# Patient Record
Sex: MALE | Race: WHITE | Employment: OTHER | URBAN - METROPOLITAN AREA
[De-identification: names, ages, dates, MRNs, and addresses within clinical notes are randomized per-mention and may not be internally consistent; named-entity substitution may affect disease eponyms.]

---

## 2017-01-10 ENCOUNTER — OFFICE VISIT (OUTPATIENT)
Dept: FAMILY MEDICINE CLINIC | Age: 72
End: 2017-01-10

## 2017-01-10 VITALS
SYSTOLIC BLOOD PRESSURE: 110 MMHG | BODY MASS INDEX: 29.28 KG/M2 | OXYGEN SATURATION: 97 % | DIASTOLIC BLOOD PRESSURE: 72 MMHG | WEIGHT: 181.4 LBS | HEART RATE: 60 BPM

## 2017-01-10 DIAGNOSIS — I10 ESSENTIAL HYPERTENSION: ICD-10-CM

## 2017-01-10 DIAGNOSIS — G89.29 CHRONIC LEFT SHOULDER PAIN: ICD-10-CM

## 2017-01-10 DIAGNOSIS — I65.23 BILATERAL CAROTID ARTERY STENOSIS: ICD-10-CM

## 2017-01-10 DIAGNOSIS — I67.9 CEREBROVASCULAR DISEASE: Primary | ICD-10-CM

## 2017-01-10 DIAGNOSIS — R01.1 SYSTOLIC MURMUR: ICD-10-CM

## 2017-01-10 DIAGNOSIS — E78.5 HYPERLIPIDEMIA, UNSPECIFIED HYPERLIPIDEMIA TYPE: ICD-10-CM

## 2017-01-10 DIAGNOSIS — M25.512 CHRONIC LEFT SHOULDER PAIN: ICD-10-CM

## 2017-01-10 DIAGNOSIS — Z23 NEED FOR INFLUENZA VACCINATION: ICD-10-CM

## 2017-01-10 PROCEDURE — 90662 IIV NO PRSV INCREASED AG IM: CPT | Performed by: FAMILY MEDICINE

## 2017-01-10 PROCEDURE — G0008 ADMIN INFLUENZA VIRUS VAC: HCPCS | Performed by: FAMILY MEDICINE

## 2017-01-10 PROCEDURE — 99214 OFFICE O/P EST MOD 30 MIN: CPT | Performed by: FAMILY MEDICINE

## 2017-01-10 RX ORDER — PREDNISONE 20 MG/1
20 TABLET ORAL DAILY
Qty: 30 TABLET | Refills: 3 | Status: CANCELLED | OUTPATIENT
Start: 2017-01-10 | End: 2017-02-09

## 2017-01-16 ENCOUNTER — HOSPITAL ENCOUNTER (OUTPATIENT)
Dept: OTHER | Age: 72
Discharge: HOME | End: 2017-01-16
Attending: FAMILY MEDICINE | Admitting: FAMILY MEDICINE

## 2017-01-16 LAB
A/G RATIO: 1.7 (ref 1.1–2.2)
ALBUMIN SERPL-MCNC: 4.9 G/DL (ref 3.4–5)
ALP BLD-CCNC: 66 U/L (ref 40–129)
ALT SERPL-CCNC: 21 U/L (ref 10–40)
ANION GAP SERPL CALCULATED.3IONS-SCNC: 20 MMOL/L (ref 3–16)
AST SERPL-CCNC: 21 U/L (ref 15–37)
BILIRUB SERPL-MCNC: 0.8 MG/DL (ref 0–1)
BUN BLDV-MCNC: 19 MG/DL (ref 7–20)
CALCIUM SERPL-MCNC: 9.9 MG/DL (ref 8.3–10.6)
CHLORIDE BLD-SCNC: 107 MMOL/L (ref 99–110)
CHOLESTEROL, TOTAL: 117 MG/DL (ref 0–199)
CO2: 20 MMOL/L (ref 21–32)
CREAT SERPL-MCNC: 1.2 MG/DL (ref 0.8–1.3)
GFR AFRICAN AMERICAN: >60
GFR NON-AFRICAN AMERICAN: 59
GLOBULIN: 2.9 G/DL
GLUCOSE BLD-MCNC: 104 MG/DL (ref 70–99)
HDLC SERPL-MCNC: 46 MG/DL (ref 40–60)
LDL CHOLESTEROL CALCULATED: 54 MG/DL
POTASSIUM SERPL-SCNC: 4.3 MMOL/L (ref 3.5–5.1)
SODIUM BLD-SCNC: 147 MMOL/L (ref 136–145)
TOTAL PROTEIN: 7.8 G/DL (ref 6.4–8.2)
TRIGL SERPL-MCNC: 85 MG/DL (ref 0–150)
VLDLC SERPL CALC-MCNC: 17 MG/DL

## 2017-01-17 ENCOUNTER — HOSPITAL ENCOUNTER (OUTPATIENT)
Dept: VASCULAR LAB | Age: 72
Discharge: HOME | End: 2017-01-17
Attending: FAMILY MEDICINE | Admitting: FAMILY MEDICINE

## 2017-01-17 ENCOUNTER — TELEPHONE (OUTPATIENT)
Dept: FAMILY MEDICINE CLINIC | Age: 72
End: 2017-01-17

## 2017-01-17 ENCOUNTER — HOSPITAL ENCOUNTER (OUTPATIENT)
Dept: NON INVASIVE DIAGNOSTICS | Age: 72
Discharge: HOME | End: 2017-01-17
Attending: FAMILY MEDICINE | Admitting: FAMILY MEDICINE

## 2017-01-17 DIAGNOSIS — R01.1 CARDIAC MURMUR: ICD-10-CM

## 2017-01-17 DIAGNOSIS — I37.1 PULMONARY VALVE INSUFFICIENCY, UNSPECIFIED ETIOLOGY: Primary | ICD-10-CM

## 2017-01-17 DIAGNOSIS — I65.23 OCCLUSION AND STENOSIS OF BILATERAL CAROTID ARTERIES: ICD-10-CM

## 2017-01-17 LAB
LEFT VENTRICULAR EJECTION FRACTION HIGH VALUE: 65 %
LEFT VENTRICULAR EJECTION FRACTION MODE: NORMAL
LV EF: 60 %
LV EF: 63 %
LVEF MODALITY: NORMAL

## 2017-02-13 ENCOUNTER — TELEPHONE (OUTPATIENT)
Dept: FAMILY MEDICINE CLINIC | Age: 72
End: 2017-02-13

## 2017-02-13 DIAGNOSIS — I67.9 CEREBROVASCULAR DISEASE: ICD-10-CM

## 2017-02-13 DIAGNOSIS — K21.9 GASTROESOPHAGEAL REFLUX DISEASE WITHOUT ESOPHAGITIS: ICD-10-CM

## 2017-02-13 RX ORDER — SIMVASTATIN 20 MG
TABLET ORAL
Qty: 90 TABLET | Refills: 3 | Status: SHIPPED | OUTPATIENT
Start: 2017-02-13 | End: 2018-05-17 | Stop reason: SDUPTHER

## 2017-02-13 RX ORDER — PANTOPRAZOLE SODIUM 40 MG/1
40 TABLET, DELAYED RELEASE ORAL DAILY
Qty: 90 TABLET | Refills: 3 | Status: SHIPPED | OUTPATIENT
Start: 2017-02-13 | End: 2018-02-26 | Stop reason: SDUPTHER

## 2017-02-15 ENCOUNTER — OFFICE VISIT (OUTPATIENT)
Dept: CARDIOLOGY CLINIC | Age: 72
End: 2017-02-15

## 2017-02-15 VITALS
WEIGHT: 181.4 LBS | BODY MASS INDEX: 29.15 KG/M2 | HEIGHT: 66 IN | SYSTOLIC BLOOD PRESSURE: 98 MMHG | DIASTOLIC BLOOD PRESSURE: 66 MMHG

## 2017-02-15 DIAGNOSIS — I10 ESSENTIAL HYPERTENSION: Primary | ICD-10-CM

## 2017-02-15 DIAGNOSIS — I65.23 BILATERAL CAROTID ARTERY STENOSIS: ICD-10-CM

## 2017-02-15 DIAGNOSIS — I37.1 NONRHEUMATIC PULMONARY VALVE INSUFFICIENCY: ICD-10-CM

## 2017-02-15 PROCEDURE — 99214 OFFICE O/P EST MOD 30 MIN: CPT | Performed by: INTERNAL MEDICINE

## 2017-02-15 PROCEDURE — 93000 ELECTROCARDIOGRAM COMPLETE: CPT | Performed by: INTERNAL MEDICINE

## 2017-03-23 RX ORDER — AMLODIPINE BESYLATE 10 MG/1
TABLET ORAL
Qty: 90 TABLET | Refills: 3 | Status: SHIPPED | OUTPATIENT
Start: 2017-03-23 | End: 2017-12-15 | Stop reason: SDUPTHER

## 2017-04-25 RX ORDER — LISINOPRIL 40 MG/1
TABLET ORAL
Qty: 90 TABLET | Refills: 0 | Status: SHIPPED | OUTPATIENT
Start: 2017-04-25 | End: 2017-08-07 | Stop reason: SDUPTHER

## 2017-05-17 ENCOUNTER — TELEPHONE (OUTPATIENT)
Dept: FAMILY MEDICINE CLINIC | Age: 72
End: 2017-05-17

## 2017-05-17 PROBLEM — R42 DIZZINESS: Status: ACTIVE | Noted: 2017-05-17

## 2017-05-22 ENCOUNTER — TELEPHONE (OUTPATIENT)
Dept: PHARMACY | Facility: CLINIC | Age: 72
End: 2017-05-22

## 2017-05-22 DIAGNOSIS — H81.10 VERTIGO, BENIGN PAROXYSMAL, UNSPECIFIED LATERALITY: Primary | ICD-10-CM

## 2017-05-23 ENCOUNTER — OFFICE VISIT (OUTPATIENT)
Dept: FAMILY MEDICINE CLINIC | Age: 72
End: 2017-05-23

## 2017-05-23 VITALS
DIASTOLIC BLOOD PRESSURE: 62 MMHG | SYSTOLIC BLOOD PRESSURE: 118 MMHG | BODY MASS INDEX: 28.57 KG/M2 | WEIGHT: 177 LBS | HEART RATE: 58 BPM | OXYGEN SATURATION: 97 %

## 2017-05-23 DIAGNOSIS — L30.9 DERMATITIS: ICD-10-CM

## 2017-05-23 DIAGNOSIS — I67.9 CEREBROVASCULAR DISEASE: ICD-10-CM

## 2017-05-23 DIAGNOSIS — R42 VERTIGO: Primary | ICD-10-CM

## 2017-05-23 RX ORDER — CLOTRIMAZOLE AND BETAMETHASONE DIPROPIONATE 10; .64 MG/G; MG/G
CREAM TOPICAL
Qty: 1 TUBE | Refills: 3 | Status: SHIPPED | OUTPATIENT
Start: 2017-05-23 | End: 2017-07-11 | Stop reason: SDUPTHER

## 2017-05-23 RX ORDER — CLOPIDOGREL BISULFATE 75 MG/1
75 TABLET ORAL DAILY
Qty: 90 TABLET | Refills: 3 | Status: SHIPPED | OUTPATIENT
Start: 2017-05-23 | End: 2018-05-07 | Stop reason: SDUPTHER

## 2017-06-07 DIAGNOSIS — R35.1 NOCTURIA: ICD-10-CM

## 2017-06-07 DIAGNOSIS — R32 INCONTINENCE: ICD-10-CM

## 2017-06-08 RX ORDER — TAMSULOSIN HYDROCHLORIDE 0.4 MG/1
CAPSULE ORAL
Qty: 30 CAPSULE | Refills: 0 | Status: SHIPPED | OUTPATIENT
Start: 2017-06-08 | End: 2018-01-11 | Stop reason: ALTCHOICE

## 2017-06-09 ENCOUNTER — HOSPITAL ENCOUNTER (OUTPATIENT)
Dept: PHYSICAL THERAPY | Age: 72
Discharge: HOME | End: 2017-06-30
Attending: FAMILY MEDICINE | Admitting: FAMILY MEDICINE

## 2017-06-17 PROCEDURE — 99496 TRANSJ CARE MGMT HIGH F2F 7D: CPT | Performed by: FAMILY MEDICINE

## 2017-07-11 ENCOUNTER — OFFICE VISIT (OUTPATIENT)
Dept: FAMILY MEDICINE CLINIC | Age: 72
End: 2017-07-11

## 2017-07-11 VITALS
BODY MASS INDEX: 28.41 KG/M2 | WEIGHT: 176 LBS | OXYGEN SATURATION: 98 % | SYSTOLIC BLOOD PRESSURE: 110 MMHG | DIASTOLIC BLOOD PRESSURE: 62 MMHG | HEART RATE: 60 BPM

## 2017-07-11 DIAGNOSIS — L30.9 DERMATITIS: ICD-10-CM

## 2017-07-11 DIAGNOSIS — I69.359 HEMIPARESIS DUE TO OLD LACUNAR STROKE (HCC): ICD-10-CM

## 2017-07-11 DIAGNOSIS — R42 VERTIGO: Primary | ICD-10-CM

## 2017-07-11 DIAGNOSIS — I10 ESSENTIAL HYPERTENSION: Chronic | ICD-10-CM

## 2017-07-11 DIAGNOSIS — E78.5 HYPERLIPIDEMIA, UNSPECIFIED HYPERLIPIDEMIA TYPE: Chronic | ICD-10-CM

## 2017-07-11 DIAGNOSIS — I65.23 BILATERAL CAROTID ARTERY STENOSIS: Chronic | ICD-10-CM

## 2017-07-11 PROCEDURE — 99214 OFFICE O/P EST MOD 30 MIN: CPT | Performed by: FAMILY MEDICINE

## 2017-07-11 RX ORDER — CLOTRIMAZOLE AND BETAMETHASONE DIPROPIONATE 10; .64 MG/G; MG/G
CREAM TOPICAL
Qty: 3 TUBE | Refills: 3 | Status: SHIPPED | OUTPATIENT
Start: 2017-07-11 | End: 2017-10-17 | Stop reason: SDUPTHER

## 2017-08-07 ENCOUNTER — TELEPHONE (OUTPATIENT)
Dept: FAMILY MEDICINE CLINIC | Age: 72
End: 2017-08-07

## 2017-08-07 RX ORDER — LISINOPRIL 40 MG/1
TABLET ORAL
Qty: 90 TABLET | Refills: 1 | Status: SHIPPED | OUTPATIENT
Start: 2017-08-07 | End: 2018-02-20 | Stop reason: SDUPTHER

## 2017-09-25 ENCOUNTER — TELEPHONE (OUTPATIENT)
Dept: FAMILY MEDICINE CLINIC | Age: 72
End: 2017-09-25

## 2017-10-17 DIAGNOSIS — L30.9 DERMATITIS: ICD-10-CM

## 2017-10-19 RX ORDER — CLOTRIMAZOLE AND BETAMETHASONE DIPROPIONATE 10; .64 MG/G; MG/G
CREAM TOPICAL
Qty: 45 G | Refills: 0 | Status: SHIPPED | OUTPATIENT
Start: 2017-10-19 | End: 2017-11-13 | Stop reason: SDUPTHER

## 2017-11-13 ENCOUNTER — TELEPHONE (OUTPATIENT)
Dept: FAMILY MEDICINE CLINIC | Age: 72
End: 2017-11-13

## 2017-11-13 DIAGNOSIS — L30.9 DERMATITIS: ICD-10-CM

## 2017-11-13 RX ORDER — CLOTRIMAZOLE AND BETAMETHASONE DIPROPIONATE 10; .64 MG/G; MG/G
CREAM TOPICAL
Qty: 45 G | Refills: 0 | Status: SHIPPED | OUTPATIENT
Start: 2017-11-13 | End: 2017-12-14 | Stop reason: SDUPTHER

## 2017-11-13 NOTE — TELEPHONE ENCOUNTER
Patient called to request a refill of    clotrimazole-betamethasone (LOTRISONE) 1-0.05 % cream [[id number]]    Pharmacy: [facility] on [address] [address]

## 2017-12-14 ENCOUNTER — TELEPHONE (OUTPATIENT)
Dept: FAMILY MEDICINE CLINIC | Age: 72
End: 2017-12-14

## 2017-12-14 DIAGNOSIS — L30.9 DERMATITIS: ICD-10-CM

## 2017-12-14 RX ORDER — CLOTRIMAZOLE AND BETAMETHASONE DIPROPIONATE 10; .64 MG/G; MG/G
CREAM TOPICAL
Qty: 45 G | Refills: 0 | Status: SHIPPED | OUTPATIENT
Start: 2017-12-14 | End: 2021-01-04 | Stop reason: SDUPTHER

## 2017-12-15 ENCOUNTER — TELEPHONE (OUTPATIENT)
Dept: FAMILY MEDICINE CLINIC | Age: 72
End: 2017-12-15

## 2017-12-15 RX ORDER — AMLODIPINE BESYLATE 10 MG/1
TABLET ORAL
Qty: 90 TABLET | Refills: 0 | Status: SHIPPED | OUTPATIENT
Start: 2017-12-15 | End: 2018-02-26 | Stop reason: SDUPTHER

## 2017-12-15 NOTE — TELEPHONE ENCOUNTER
amLODIPine (NORVASC) 10 MG tablet 90 tablet 3 3/23/2017     Sig: TAKE 1 TABLET DAILY      [facility] in chart

## 2018-01-11 ENCOUNTER — OFFICE VISIT (OUTPATIENT)
Dept: FAMILY MEDICINE CLINIC | Age: 73
End: 2018-01-11

## 2018-01-11 VITALS
BODY MASS INDEX: 29.18 KG/M2 | HEART RATE: 65 BPM | SYSTOLIC BLOOD PRESSURE: 130 MMHG | DIASTOLIC BLOOD PRESSURE: 72 MMHG | OXYGEN SATURATION: 96 % | WEIGHT: 180.8 LBS

## 2018-01-11 DIAGNOSIS — R27.0 ATAXIA: ICD-10-CM

## 2018-01-11 DIAGNOSIS — I69.359 HEMIPARESIS DUE TO OLD LACUNAR STROKE (HCC): ICD-10-CM

## 2018-01-11 DIAGNOSIS — D69.6 THROMBOCYTOPENIA (HCC): ICD-10-CM

## 2018-01-11 DIAGNOSIS — R73.9 HYPERGLYCEMIA: ICD-10-CM

## 2018-01-11 DIAGNOSIS — I67.9 CEREBROVASCULAR DISEASE: ICD-10-CM

## 2018-01-11 DIAGNOSIS — I65.23 BILATERAL CAROTID ARTERY STENOSIS: Chronic | ICD-10-CM

## 2018-01-11 DIAGNOSIS — E78.5 HYPERLIPIDEMIA, UNSPECIFIED HYPERLIPIDEMIA TYPE: Chronic | ICD-10-CM

## 2018-01-11 DIAGNOSIS — I10 ESSENTIAL HYPERTENSION: Primary | Chronic | ICD-10-CM

## 2018-01-11 PROCEDURE — 99214 OFFICE O/P EST MOD 30 MIN: CPT | Performed by: FAMILY MEDICINE

## 2018-01-11 NOTE — PROGRESS NOTES
Subjective:      Patient ID: Iris Mcknight is a 67 y.o. male. HPI     Patient is doing well overall. He gets out to lunch or dinner with his sister about once a week. He ambulates with the assistance of a walker. History of carotid artery stenosis. CT angiogram of the head and 5/17/2017 showed moderate stenosis of the proximal right internal carotid artery of 50% and mild stenosis in the proximal left internal carotid artery of 25%. He says a history of seborrheic dermatitis. Lotrisone cream seems to be helping significantly. Does not check home bp. No ha, cp, or sob. Once every 3-4 months has a brief seconds long pain in left testicle. No swelling or masses. No urination problems. Hx of thrombocytopenia.   Follows with Dr. Rina Abernathy.     Review of Systems    Patient Active Problem List   Diagnosis    Hypertension    Physical exam, routine    Cerebrovascular disease    DDD (degenerative disc disease), lumbosacral    Displacement of lumbar intervertebral disc without myelopathy    Hyperlipidemia    Carotid artery stenosis    Conductive hearing loss of both ears    Impacted cerumen of both ears    Thrombocytopenia ([address]    Acute kidney injury ([address]    Gastroesophageal reflux disease without esophagitis    Right hemiparesis (HCC)    Vertigo    Ataxia    Hemiparesis due to old lacunar stroke ([address]    Vertigo, benign paroxysmal    HTN (hypertension), benign    CAD in native artery    Dyslipidemia       Outpatient Prescriptions Marked as Taking for the 1/11/18 encounter (Office Visit) with Teresa Alanis MD   Medication Sig Dispense Refill    amLODIPine (NORVASC) 10 MG tablet TAKE 1 TABLET DAILY 90 tablet 0    clotrimazole-betamethasone (LOTRISONE) 1-0.05 % cream APPLY TOPICALLY TWICE DAILY 45 g 0    lisinopril (PRINIVIL;ZESTRIL) 40 MG tablet TAKE 1 TABLET DAILY 90 tablet 1    clopidogrel (PLAVIX) 75 MG tablet Take 1 tablet by mouth daily 90 tablet 3    aspirin

## 2018-01-25 ENCOUNTER — HOSPITAL ENCOUNTER (OUTPATIENT)
Dept: OTHER | Age: 73
Discharge: HOME | End: 2018-01-25
Attending: FAMILY MEDICINE | Admitting: FAMILY MEDICINE

## 2018-01-25 DIAGNOSIS — D69.6 THROMBOCYTOPENIA (HCC): ICD-10-CM

## 2018-01-25 DIAGNOSIS — R73.9 HYPERGLYCEMIA: ICD-10-CM

## 2018-01-25 DIAGNOSIS — I10 ESSENTIAL HYPERTENSION: Chronic | ICD-10-CM

## 2018-01-25 LAB
ANION GAP SERPL CALCULATED.3IONS-SCNC: 13 MMOL/L (ref 3–16)
BUN BLDV-MCNC: 15 MG/DL (ref 7–20)
CALCIUM SERPL-MCNC: 9.8 MG/DL (ref 8.3–10.6)
CHLORIDE BLD-SCNC: 103 MMOL/L (ref 99–110)
CO2: 24 MMOL/L (ref 21–32)
CREAT SERPL-MCNC: 1 MG/DL (ref 0.8–1.3)
GFR AFRICAN AMERICAN: >60
GFR NON-AFRICAN AMERICAN: >60
GLUCOSE BLD-MCNC: 97 MG/DL (ref 70–99)
HCT VFR BLD CALC: 50.4 % (ref 40.5–52.5)
HEMOGLOBIN: 17.1 G/DL (ref 13.5–17.5)
MCH RBC QN AUTO: 31.6 PG (ref 26–34)
MCHC RBC AUTO-ENTMCNC: 33.9 G/DL (ref 31–36)
MCV RBC AUTO: 93.1 FL (ref 80–100)
PDW BLD-RTO: 13.7 % (ref 12.4–15.4)
PLATELET # BLD: 115 K/UL (ref 135–450)
PMV BLD AUTO: 9.4 FL (ref 5–10.5)
POTASSIUM SERPL-SCNC: 3.9 MMOL/L (ref 3.5–5.1)
RBC # BLD: 5.41 M/UL (ref 4.2–5.9)
SODIUM BLD-SCNC: 140 MMOL/L (ref 136–145)
WBC # BLD: 6.5 K/UL (ref 4–11)

## 2018-01-26 LAB
ESTIMATED AVERAGE GLUCOSE: 111.2 MG/DL
HBA1C MFR BLD: 5.5 %

## 2018-02-20 RX ORDER — LISINOPRIL 40 MG/1
TABLET ORAL
Qty: 90 TABLET | Refills: 2 | Status: SHIPPED | OUTPATIENT
Start: 2018-02-20 | End: 2018-08-27 | Stop reason: SDUPTHER

## 2018-02-26 ENCOUNTER — TELEPHONE (OUTPATIENT)
Dept: FAMILY MEDICINE CLINIC | Age: 73
End: 2018-02-26

## 2018-02-26 DIAGNOSIS — K21.9 GASTROESOPHAGEAL REFLUX DISEASE WITHOUT ESOPHAGITIS: ICD-10-CM

## 2018-02-26 RX ORDER — AMLODIPINE BESYLATE 10 MG/1
TABLET ORAL
Qty: 90 TABLET | Refills: 0 | Status: SHIPPED | OUTPATIENT
Start: 2018-02-26 | End: 2018-06-05 | Stop reason: SDUPTHER

## 2018-02-26 RX ORDER — PANTOPRAZOLE SODIUM 40 MG/1
40 TABLET, DELAYED RELEASE ORAL DAILY
Qty: 90 TABLET | Refills: 3 | Status: SHIPPED | OUTPATIENT
Start: 2018-02-26 | End: 2019-04-04 | Stop reason: SDUPTHER

## 2018-02-26 NOTE — TELEPHONE ENCOUNTER
pantoprazole (PROTONIX) 40 MG tablet 90 tablet 3 2/13/2017     Sig - Route:  Take 1 tablet by mouth daily - Oral      [facility] in chart

## 2018-02-26 NOTE — TELEPHONE ENCOUNTER
amLODIPine (NORVASC) 10 MG tablet 90 tablet 0 12/15/2017     Sig: TAKE 1 TABLET DAILY      [facility] in chart

## 2018-04-19 ENCOUNTER — OFFICE VISIT (OUTPATIENT)
Dept: FAMILY MEDICINE CLINIC | Age: 73
End: 2018-04-19

## 2018-04-19 ENCOUNTER — TELEPHONE (OUTPATIENT)
Dept: FAMILY MEDICINE CLINIC | Age: 73
End: 2018-04-19

## 2018-04-19 VITALS
BODY MASS INDEX: 29.57 KG/M2 | WEIGHT: 183.2 LBS | SYSTOLIC BLOOD PRESSURE: 122 MMHG | DIASTOLIC BLOOD PRESSURE: 72 MMHG | HEART RATE: 73 BPM | OXYGEN SATURATION: 96 %

## 2018-04-19 DIAGNOSIS — M85.80 OSTEOPENIA, UNSPECIFIED LOCATION: Primary | ICD-10-CM

## 2018-04-19 DIAGNOSIS — M25.552 LEFT HIP PAIN: Primary | ICD-10-CM

## 2018-04-19 DIAGNOSIS — M25.552 LEFT HIP PAIN: ICD-10-CM

## 2018-04-19 PROCEDURE — 1111F DSCHRG MED/CURRENT MED MERGE: CPT | Performed by: FAMILY MEDICINE

## 2018-04-19 PROCEDURE — 99213 OFFICE O/P EST LOW 20 MIN: CPT | Performed by: FAMILY MEDICINE

## 2018-04-23 ENCOUNTER — TELEPHONE (OUTPATIENT)
Dept: FAMILY MEDICINE CLINIC | Age: 73
End: 2018-04-23

## 2018-04-23 DIAGNOSIS — M25.552 LEFT HIP PAIN: ICD-10-CM

## 2018-04-23 DIAGNOSIS — I10 ESSENTIAL HYPERTENSION: Primary | Chronic | ICD-10-CM

## 2018-04-25 ENCOUNTER — TELEPHONE (OUTPATIENT)
Dept: FAMILY MEDICINE CLINIC | Age: 73
End: 2018-04-25

## 2018-04-30 ENCOUNTER — HOSPITAL ENCOUNTER (OUTPATIENT)
Dept: GENERAL RADIOLOGY | Age: 73
Discharge: HOME | End: 2018-04-30
Admitting: FAMILY MEDICINE

## 2018-04-30 DIAGNOSIS — M85.80 OTHER SPECIFIED DISORDERS OF BONE DENSITY AND STRUCTURE, UNSPECIFIED SITE (CODE): ICD-10-CM

## 2018-04-30 DIAGNOSIS — M25.552 LEFT HIP PAIN: ICD-10-CM

## 2018-04-30 DIAGNOSIS — M85.80 OSTEOPENIA, UNSPECIFIED LOCATION: ICD-10-CM

## 2018-04-30 PROBLEM — M81.0 AGE-RELATED OSTEOPOROSIS WITHOUT CURRENT PATHOLOGICAL FRACTURE: Status: ACTIVE | Noted: 2018-04-30

## 2018-04-30 LAB
ANION GAP SERPL CALCULATED.3IONS-SCNC: 13 MMOL/L (ref 3–16)
BUN BLDV-MCNC: 13 MG/DL (ref 7–20)
CALCIUM SERPL-MCNC: 10.3 MG/DL (ref 8.3–10.6)
CHLORIDE BLD-SCNC: 100 MMOL/L (ref 99–110)
CO2: 24 MMOL/L (ref 21–32)
CREAT SERPL-MCNC: 1.2 MG/DL (ref 0.8–1.3)
GFR AFRICAN AMERICAN: >60
GFR NON-AFRICAN AMERICAN: 59
GLUCOSE BLD-MCNC: 122 MG/DL (ref 70–99)
POTASSIUM SERPL-SCNC: 4.3 MMOL/L (ref 3.5–5.1)
SODIUM BLD-SCNC: 137 MMOL/L (ref 136–145)

## 2018-05-03 ENCOUNTER — TELEPHONE (OUTPATIENT)
Dept: FAMILY MEDICINE CLINIC | Age: 73
End: 2018-05-03

## 2018-05-03 RX ORDER — ALENDRONATE SODIUM 70 MG/1
70 TABLET ORAL
Qty: 4 TABLET | Refills: 3 | Status: CANCELLED | OUTPATIENT
Start: 2018-05-03

## 2018-05-07 ENCOUNTER — OFFICE VISIT (OUTPATIENT)
Dept: FAMILY MEDICINE CLINIC | Age: 73
End: 2018-05-07

## 2018-05-07 VITALS
HEIGHT: 65 IN | HEART RATE: 58 BPM | BODY MASS INDEX: 30.16 KG/M2 | WEIGHT: 181 LBS | OXYGEN SATURATION: 96 % | DIASTOLIC BLOOD PRESSURE: 70 MMHG | SYSTOLIC BLOOD PRESSURE: 124 MMHG

## 2018-05-07 DIAGNOSIS — I67.9 CEREBROVASCULAR DISEASE: ICD-10-CM

## 2018-05-07 DIAGNOSIS — M25.552 ACUTE HIP PAIN, LEFT: Primary | ICD-10-CM

## 2018-05-07 DIAGNOSIS — M81.0 OSTEOPOROSIS WITHOUT CURRENT PATHOLOGICAL FRACTURE, UNSPECIFIED OSTEOPOROSIS TYPE: ICD-10-CM

## 2018-05-07 PROCEDURE — 99213 OFFICE O/P EST LOW 20 MIN: CPT | Performed by: PHYSICIAN ASSISTANT

## 2018-05-07 RX ORDER — ALENDRONATE SODIUM 70 MG/1
70 TABLET ORAL
Qty: 5 TABLET | Refills: 11 | Status: SHIPPED | OUTPATIENT
Start: 2018-05-07 | End: 2019-04-04 | Stop reason: SDUPTHER

## 2018-05-07 RX ORDER — CLOPIDOGREL BISULFATE 75 MG/1
75 TABLET ORAL DAILY
Qty: 90 TABLET | Refills: 3 | Status: SHIPPED | OUTPATIENT
Start: 2018-05-07 | End: 2018-08-27 | Stop reason: SDUPTHER

## 2018-05-07 ASSESSMENT — ENCOUNTER SYMPTOMS
ABDOMINAL PAIN: 0
SHORTNESS OF BREATH: 0
NAUSEA: 0
COUGH: 0
CONSTIPATION: 0
VOMITING: 0
BACK PAIN: 0

## 2018-05-17 DIAGNOSIS — I67.9 CEREBROVASCULAR DISEASE: ICD-10-CM

## 2018-05-17 RX ORDER — SIMVASTATIN 20 MG
TABLET ORAL
Qty: 90 TABLET | Refills: 0 | Status: SHIPPED | OUTPATIENT
Start: 2018-05-17 | End: 2018-08-29 | Stop reason: SDUPTHER

## 2018-06-05 ENCOUNTER — TELEPHONE (OUTPATIENT)
Dept: FAMILY MEDICINE CLINIC | Age: 73
End: 2018-06-05

## 2018-06-05 RX ORDER — AMLODIPINE BESYLATE 10 MG/1
TABLET ORAL
Qty: 90 TABLET | Refills: 1 | Status: SHIPPED | OUTPATIENT
Start: 2018-06-05 | End: 2018-12-17 | Stop reason: SDUPTHER

## 2018-06-28 ENCOUNTER — TELEPHONE (OUTPATIENT)
Dept: FAMILY MEDICINE CLINIC | Age: 73
End: 2018-06-28

## 2018-08-20 ENCOUNTER — TELEPHONE (OUTPATIENT)
Dept: FAMILY MEDICINE CLINIC | Age: 73
End: 2018-08-20

## 2018-08-27 DIAGNOSIS — I67.9 CEREBROVASCULAR DISEASE: ICD-10-CM

## 2018-08-27 RX ORDER — LISINOPRIL 40 MG/1
TABLET ORAL
Qty: 30 TABLET | Refills: 0 | Status: SHIPPED | OUTPATIENT
Start: 2018-08-27 | End: 2018-12-17 | Stop reason: SDUPTHER

## 2018-08-27 RX ORDER — CLOPIDOGREL BISULFATE 75 MG/1
75 TABLET ORAL DAILY
Qty: 30 TABLET | Refills: 0 | Status: SHIPPED | OUTPATIENT
Start: 2018-08-27 | End: 2019-07-09 | Stop reason: SDUPTHER

## 2018-08-28 ENCOUNTER — OFFICE VISIT (OUTPATIENT)
Dept: FAMILY MEDICINE CLINIC | Age: 73
End: 2018-08-28

## 2018-08-28 VITALS
DIASTOLIC BLOOD PRESSURE: 62 MMHG | BODY MASS INDEX: 28.79 KG/M2 | HEART RATE: 66 BPM | WEIGHT: 173 LBS | OXYGEN SATURATION: 97 % | SYSTOLIC BLOOD PRESSURE: 110 MMHG

## 2018-08-28 DIAGNOSIS — S22.31XA CLOSED FRACTURE OF ONE RIB OF RIGHT SIDE, INITIAL ENCOUNTER: Primary | ICD-10-CM

## 2018-08-28 PROCEDURE — 3288F FALL RISK ASSESSMENT DOCD: CPT | Performed by: PHYSICIAN ASSISTANT

## 2018-08-28 PROCEDURE — G8510 SCR DEP NEG, NO PLAN REQD: HCPCS | Performed by: PHYSICIAN ASSISTANT

## 2018-08-28 PROCEDURE — 99213 OFFICE O/P EST LOW 20 MIN: CPT | Performed by: PHYSICIAN ASSISTANT

## 2018-08-28 ASSESSMENT — PATIENT HEALTH QUESTIONNAIRE - PHQ9
SUM OF ALL RESPONSES TO PHQ QUESTIONS 1-9: 0
1. LITTLE INTEREST OR PLEASURE IN DOING THINGS: 0
SUM OF ALL RESPONSES TO PHQ9 QUESTIONS 1 & 2: 0
SUM OF ALL RESPONSES TO PHQ QUESTIONS 1-9: 0
2. FEELING DOWN, DEPRESSED OR HOPELESS: 0

## 2018-08-28 ASSESSMENT — ENCOUNTER SYMPTOMS
ABDOMINAL PAIN: 0
VOMITING: 0
SHORTNESS OF BREATH: 0
DIARRHEA: 0
NAUSEA: 0
CONSTIPATION: 0
BACK PAIN: 0
COUGH: 0

## 2018-08-28 NOTE — PROGRESS NOTES
I have reviewed the history and physical note and findings.
rales.   Tender to palpate right lateral posterior mid range rib area   Neurological: He is alert and oriented to person, place, and time. Skin: Skin is warm, dry and intact. No bruising and no rash noted. No erythema. Assessment:      Rafat Hui was seen today for fall. Diagnoses and all orders for this visit:    Closed fracture of one rib of right side, initial encounter               Plan:      Can use Tylenol prn as well as the lidocaine patch. Monitor BM's and return if needed. Sounds like he is anxious about going out, so it effects his bowels. He has a incentive spirometry as well to use.          [place], [place]

## 2018-08-29 ENCOUNTER — TELEPHONE (OUTPATIENT)
Dept: FAMILY MEDICINE CLINIC | Age: 73
End: 2018-08-29

## 2018-08-29 DIAGNOSIS — I67.9 CEREBROVASCULAR DISEASE: ICD-10-CM

## 2018-08-29 RX ORDER — SIMVASTATIN 20 MG
TABLET ORAL
Qty: 90 TABLET | Refills: 0 | Status: SHIPPED | OUTPATIENT
Start: 2018-08-29 | End: 2018-10-29 | Stop reason: SDUPTHER

## 2018-08-29 NOTE — TELEPHONE ENCOUNTER
simvastatin (ZOCOR) 20 MG tablet 90 tablet 0 5/17/2018     Sig: TAKE 1 TABLET NIGHTLY    Sent to pharmacy as: Simvastatin 20 MG Oral Tablet        [facility] [place] [address] [facility] [address]  Phone: [phone] Fax: [phone]

## 2018-08-30 DIAGNOSIS — R27.0 ATAXIA: ICD-10-CM

## 2018-08-30 DIAGNOSIS — S22.39XA CLOSED FRACTURE OF ONE RIB, UNSPECIFIED LATERALITY, INITIAL ENCOUNTER: Primary | ICD-10-CM

## 2018-08-30 DIAGNOSIS — Z12.11 COLON CANCER SCREENING: Primary | ICD-10-CM

## 2018-08-30 DIAGNOSIS — M54.50 LOW BACK PAIN WITHOUT SCIATICA, UNSPECIFIED BACK PAIN LATERALITY, UNSPECIFIED CHRONICITY: Primary | ICD-10-CM

## 2018-08-30 DIAGNOSIS — I25.10 CAD IN NATIVE ARTERY: ICD-10-CM

## 2018-08-30 RX ORDER — LIDOCAINE 50 MG/G
1 PATCH TOPICAL DAILY
Qty: 30 PATCH | Refills: 0 | Status: SHIPPED | OUTPATIENT
Start: 2018-08-30 | End: 2019-01-24 | Stop reason: ALTCHOICE

## 2018-08-30 NOTE — TELEPHONE ENCOUNTER
Sebas Brown at home 4 days ago. Was seen in ER. Lidoderm sent. Would like evaluation by home nurse for rib fracture. Has constipation,  Asked to try OTC miralax. Stool softener recommended. Please send colace 50mg daily to pharmacy. Needs colonoscopy. Please refer and give him contact information.

## 2018-08-31 ENCOUNTER — TELEPHONE (OUTPATIENT)
Dept: FAMILY MEDICINE CLINIC | Age: 73
End: 2018-08-31

## 2018-08-31 ENCOUNTER — TELEPHONE (OUTPATIENT)
Dept: RHEUMATOLOGY | Age: 73
End: 2018-08-31

## 2018-08-31 NOTE — TELEPHONE ENCOUNTER
Spoke with Nghia Riddle at [facility]. She states that if TS is just wanting a home health aid to come out then there needs to be orders for PT or OT with that. If PT and OT are not needed then the patient will need to use a non-skilled nursing agency. Please advise. **aware TS is out of the office today.

## 2018-09-04 ENCOUNTER — TELEPHONE (OUTPATIENT)
Dept: FAMILY MEDICINE CLINIC | Age: 73
End: 2018-09-04

## 2018-10-04 ENCOUNTER — TELEPHONE (OUTPATIENT)
Dept: FAMILY MEDICINE CLINIC | Age: 73
End: 2018-10-04

## 2018-10-29 DIAGNOSIS — I67.9 CEREBROVASCULAR DISEASE: ICD-10-CM

## 2018-10-29 RX ORDER — SIMVASTATIN 20 MG
TABLET ORAL
Qty: 90 TABLET | Refills: 1 | Status: SHIPPED | OUTPATIENT
Start: 2018-10-29 | End: 2019-04-04 | Stop reason: SDUPTHER

## 2018-12-17 ENCOUNTER — TELEPHONE (OUTPATIENT)
Dept: FAMILY MEDICINE CLINIC | Age: 73
End: 2018-12-17

## 2018-12-17 RX ORDER — LISINOPRIL 40 MG/1
TABLET ORAL
Qty: 30 TABLET | Refills: 0 | Status: SHIPPED | OUTPATIENT
Start: 2018-12-17 | End: 2019-01-25 | Stop reason: SDUPTHER

## 2018-12-17 RX ORDER — AMLODIPINE BESYLATE 10 MG/1
TABLET ORAL
Qty: 90 TABLET | Refills: 1 | Status: SHIPPED | OUTPATIENT
Start: 2018-12-17 | End: 2019-03-21 | Stop reason: SDUPTHER

## 2019-01-24 ENCOUNTER — OFFICE VISIT (OUTPATIENT)
Dept: FAMILY MEDICINE CLINIC | Age: 74
End: 2019-01-24
Payer: MEDICARE

## 2019-01-24 VITALS
WEIGHT: 175.8 LBS | BODY MASS INDEX: 29.25 KG/M2 | HEART RATE: 63 BPM | SYSTOLIC BLOOD PRESSURE: 130 MMHG | DIASTOLIC BLOOD PRESSURE: 64 MMHG | OXYGEN SATURATION: 97 %

## 2019-01-24 DIAGNOSIS — R73.9 HYPERGLYCEMIA: ICD-10-CM

## 2019-01-24 DIAGNOSIS — Z00.00 ROUTINE GENERAL MEDICAL EXAMINATION AT A HEALTH CARE FACILITY: ICD-10-CM

## 2019-01-24 DIAGNOSIS — H90.0 CONDUCTIVE HEARING LOSS OF BOTH EARS: ICD-10-CM

## 2019-01-24 DIAGNOSIS — I25.10 CAD IN NATIVE ARTERY: ICD-10-CM

## 2019-01-24 DIAGNOSIS — Z23 NEED FOR PROPHYLACTIC VACCINATION AND INOCULATION AGAINST VARICELLA: ICD-10-CM

## 2019-01-24 DIAGNOSIS — Z00.00 MEDICARE ANNUAL WELLNESS VISIT, SUBSEQUENT: Primary | ICD-10-CM

## 2019-01-24 DIAGNOSIS — Z71.89 ACP (ADVANCE CARE PLANNING): ICD-10-CM

## 2019-01-24 DIAGNOSIS — M65.4 TENOSYNOVITIS, DE QUERVAIN: ICD-10-CM

## 2019-01-24 DIAGNOSIS — D69.6 THROMBOCYTOPENIA (HCC): ICD-10-CM

## 2019-01-24 DIAGNOSIS — I69.359 HEMIPARESIS DUE TO OLD LACUNAR STROKE (HCC): ICD-10-CM

## 2019-01-24 DIAGNOSIS — E78.5 HYPERLIPIDEMIA, UNSPECIFIED HYPERLIPIDEMIA TYPE: Chronic | ICD-10-CM

## 2019-01-24 DIAGNOSIS — Z23 NEED FOR VACCINATION: ICD-10-CM

## 2019-01-24 DIAGNOSIS — R07.9 CHEST PAIN, UNSPECIFIED TYPE: ICD-10-CM

## 2019-01-24 DIAGNOSIS — I10 HTN (HYPERTENSION), BENIGN: ICD-10-CM

## 2019-01-24 DIAGNOSIS — K21.9 GASTROESOPHAGEAL REFLUX DISEASE WITHOUT ESOPHAGITIS: ICD-10-CM

## 2019-01-24 DIAGNOSIS — Z12.11 SCREENING FOR COLORECTAL CANCER: ICD-10-CM

## 2019-01-24 DIAGNOSIS — Z12.12 SCREENING FOR COLORECTAL CANCER: ICD-10-CM

## 2019-01-24 DIAGNOSIS — Z13.6 SCREENING FOR CARDIOVASCULAR CONDITION: ICD-10-CM

## 2019-01-24 PROCEDURE — 99497 ADVNCD CARE PLAN 30 MIN: CPT | Performed by: FAMILY MEDICINE

## 2019-01-24 PROCEDURE — 93000 ELECTROCARDIOGRAM COMPLETE: CPT | Performed by: FAMILY MEDICINE

## 2019-01-24 PROCEDURE — G0439 PPPS, SUBSEQ VISIT: HCPCS | Performed by: FAMILY MEDICINE

## 2019-01-24 ASSESSMENT — ANXIETY QUESTIONNAIRES: GAD7 TOTAL SCORE: 0

## 2019-01-24 ASSESSMENT — LIFESTYLE VARIABLES: HOW OFTEN DO YOU HAVE A DRINK CONTAINING ALCOHOL: 0

## 2019-01-24 ASSESSMENT — PATIENT HEALTH QUESTIONNAIRE - PHQ9
SUM OF ALL RESPONSES TO PHQ QUESTIONS 1-9: 0
SUM OF ALL RESPONSES TO PHQ QUESTIONS 1-9: 0

## 2019-01-25 ENCOUNTER — TELEPHONE (OUTPATIENT)
Dept: FAMILY MEDICINE CLINIC | Age: 74
End: 2019-01-25

## 2019-01-25 RX ORDER — LISINOPRIL 40 MG/1
TABLET ORAL
Qty: 90 TABLET | Refills: 3 | Status: SHIPPED | OUTPATIENT
Start: 2019-01-25 | End: 2020-11-25 | Stop reason: SDUPTHER

## 2019-01-29 ENCOUNTER — HOSPITAL ENCOUNTER (OUTPATIENT)
Age: 74
Discharge: HOME | End: 2019-01-29
Payer: MEDICARE

## 2019-01-29 DIAGNOSIS — Z13.6 SCREENING FOR CARDIOVASCULAR CONDITION: ICD-10-CM

## 2019-01-29 DIAGNOSIS — R73.9 HYPERGLYCEMIA: ICD-10-CM

## 2019-01-29 DIAGNOSIS — I10 HTN (HYPERTENSION), BENIGN: ICD-10-CM

## 2019-01-29 DIAGNOSIS — D69.6 THROMBOCYTOPENIA (HCC): ICD-10-CM

## 2019-01-29 LAB
A/G RATIO: 1.7 (ref 1.1–2.2)
ALBUMIN SERPL-MCNC: 4.8 G/DL (ref 3.4–5)
ALP BLD-CCNC: 53 U/L (ref 40–129)
ALT SERPL-CCNC: 16 U/L (ref 10–40)
ANION GAP SERPL CALCULATED.3IONS-SCNC: 13 MMOL/L (ref 3–16)
AST SERPL-CCNC: 20 U/L (ref 15–37)
BASOPHILS ABSOLUTE: 0 K/UL (ref 0–0.2)
BASOPHILS RELATIVE PERCENT: 1 %
BILIRUB SERPL-MCNC: 0.6 MG/DL (ref 0–1)
BUN BLDV-MCNC: 14 MG/DL (ref 7–20)
CALCIUM SERPL-MCNC: 9.6 MG/DL (ref 8.3–10.6)
CHLORIDE BLD-SCNC: 104 MMOL/L (ref 99–110)
CHOLESTEROL, TOTAL: 117 MG/DL (ref 0–199)
CO2: 23 MMOL/L (ref 21–32)
CREAT SERPL-MCNC: 1 MG/DL (ref 0.8–1.3)
EOSINOPHILS ABSOLUTE: 0.1 K/UL (ref 0–0.6)
EOSINOPHILS RELATIVE PERCENT: 1.3 %
GFR AFRICAN AMERICAN: >60
GFR NON-AFRICAN AMERICAN: >60
GLOBULIN: 2.9 G/DL
GLUCOSE BLD-MCNC: 98 MG/DL (ref 70–99)
HCT VFR BLD CALC: 48.9 % (ref 40.5–52.5)
HDLC SERPL-MCNC: 39 MG/DL (ref 40–60)
HEMOGLOBIN: 16.6 G/DL (ref 13.5–17.5)
LDL CHOLESTEROL CALCULATED: 56 MG/DL
LYMPHOCYTES ABSOLUTE: 1.1 K/UL (ref 1–5.1)
LYMPHOCYTES RELATIVE PERCENT: 24.2 %
MCH RBC QN AUTO: 32.1 PG (ref 26–34)
MCHC RBC AUTO-ENTMCNC: 33.9 G/DL (ref 31–36)
MCV RBC AUTO: 94.6 FL (ref 80–100)
MONOCYTES ABSOLUTE: 0.6 K/UL (ref 0–1.3)
MONOCYTES RELATIVE PERCENT: 14.4 %
NEUTROPHILS ABSOLUTE: 2.6 K/UL (ref 1.7–7.7)
NEUTROPHILS RELATIVE PERCENT: 59.1 %
PDW BLD-RTO: 13.1 % (ref 12.4–15.4)
PLATELET # BLD: 106 K/UL (ref 135–450)
PMV BLD AUTO: 9.5 FL (ref 5–10.5)
POTASSIUM SERPL-SCNC: 4 MMOL/L (ref 3.5–5.1)
RBC # BLD: 5.16 M/UL (ref 4.2–5.9)
SODIUM BLD-SCNC: 140 MMOL/L (ref 136–145)
TOTAL PROTEIN: 7.7 G/DL (ref 6.4–8.2)
TRIGL SERPL-MCNC: 108 MG/DL (ref 0–150)
VLDLC SERPL CALC-MCNC: 22 MG/DL
WBC # BLD: 4.4 K/UL (ref 4–11)

## 2019-01-29 PROCEDURE — 80061 LIPID PANEL: CPT

## 2019-01-29 PROCEDURE — 85025 COMPLETE CBC W/AUTO DIFF WBC: CPT

## 2019-01-29 PROCEDURE — 36415 COLL VENOUS BLD VENIPUNCTURE: CPT

## 2019-01-29 PROCEDURE — 83036 HEMOGLOBIN GLYCOSYLATED A1C: CPT

## 2019-01-29 PROCEDURE — 80053 COMPREHEN METABOLIC PANEL: CPT

## 2019-01-30 LAB
ESTIMATED AVERAGE GLUCOSE: 114 MG/DL
HBA1C MFR BLD: 5.6 %

## 2019-02-04 ENCOUNTER — HOSPITAL ENCOUNTER (OUTPATIENT)
Age: 74
Discharge: HOME | End: 2019-02-04
Payer: MEDICARE

## 2019-02-04 PROCEDURE — 83520 IMMUNOASSAY QUANT NOS NONAB: CPT

## 2019-02-06 LAB
REASON FOR REJECTION: NORMAL
REJECTED TEST: 7190

## 2019-02-08 ENCOUNTER — HOSPITAL ENCOUNTER (OUTPATIENT)
Dept: NON INVASIVE DIAGNOSTICS | Age: 74
Discharge: HOME | End: 2019-02-08
Payer: MEDICARE

## 2019-02-08 LAB
LV EF: 71 %
LVEF MODALITY: NORMAL

## 2019-02-08 PROCEDURE — 3430000000 HC RX DIAGNOSTIC RADIOPHARMACEUTICAL: Performed by: FAMILY MEDICINE

## 2019-02-08 PROCEDURE — A9502 TC99M TETROFOSMIN: HCPCS | Performed by: FAMILY MEDICINE

## 2019-02-08 PROCEDURE — 93017 CV STRESS TEST TRACING ONLY: CPT | Performed by: INTERNAL MEDICINE

## 2019-02-08 PROCEDURE — 6360000002 HC RX W HCPCS: Performed by: FAMILY MEDICINE

## 2019-02-08 PROCEDURE — 78452 HT MUSCLE IMAGE SPECT MULT: CPT | Performed by: INTERNAL MEDICINE

## 2019-02-08 PROCEDURE — 6360000002 HC RX W HCPCS: Performed by: INTERNAL MEDICINE

## 2019-02-08 RX ORDER — AMINOPHYLLINE DIHYDRATE 25 MG/ML
100 INJECTION, SOLUTION INTRAVENOUS ONCE
Status: COMPLETED | OUTPATIENT
Start: 2019-02-08 | End: 2019-02-08

## 2019-02-08 RX ADMIN — TETROFOSMIN 30 MILLICURIE: 0.23 INJECTION, POWDER, LYOPHILIZED, FOR SOLUTION INTRAVENOUS at 13:21

## 2019-02-08 RX ADMIN — AMINOPHYLLINE 100 MG: 25 INJECTION, SOLUTION INTRAVENOUS at 13:20

## 2019-02-08 RX ADMIN — TETROFOSMIN 10 MILLICURIE: 0.23 INJECTION, POWDER, LYOPHILIZED, FOR SOLUTION INTRAVENOUS at 12:30

## 2019-02-08 RX ADMIN — REGADENOSON 0.4 MG: 0.08 INJECTION, SOLUTION INTRAVENOUS at 13:14

## 2019-02-14 ENCOUNTER — HOSPITAL ENCOUNTER (OUTPATIENT)
Age: 74
Discharge: HOME | End: 2019-02-14
Payer: MEDICARE

## 2019-02-14 DIAGNOSIS — Z12.12 SCREENING FOR COLORECTAL CANCER: ICD-10-CM

## 2019-02-14 DIAGNOSIS — Z12.11 SCREENING FOR COLORECTAL CANCER: ICD-10-CM

## 2019-02-14 PROCEDURE — 83520 IMMUNOASSAY QUANT NOS NONAB: CPT

## 2019-02-25 ENCOUNTER — TELEPHONE (OUTPATIENT)
Dept: FAMILY MEDICINE CLINIC | Age: 74
End: 2019-02-25

## 2019-02-25 LAB — OCCULT BLOOD, FECAL, IA: NEGATIVE

## 2019-03-19 ENCOUNTER — TELEPHONE (OUTPATIENT)
Dept: FAMILY MEDICINE CLINIC | Age: 74
End: 2019-03-19

## 2019-03-21 RX ORDER — AMLODIPINE BESYLATE 10 MG/1
TABLET ORAL
Qty: 90 TABLET | Refills: 3 | Status: SHIPPED | OUTPATIENT
Start: 2019-03-21 | End: 2019-08-30 | Stop reason: SDUPTHER

## 2019-04-04 ENCOUNTER — OFFICE VISIT (OUTPATIENT)
Dept: FAMILY MEDICINE CLINIC | Age: 74
End: 2019-04-04
Payer: MEDICARE

## 2019-04-04 VITALS
HEART RATE: 72 BPM | DIASTOLIC BLOOD PRESSURE: 68 MMHG | SYSTOLIC BLOOD PRESSURE: 120 MMHG | WEIGHT: 177 LBS | TEMPERATURE: 98.3 F | BODY MASS INDEX: 29.45 KG/M2 | OXYGEN SATURATION: 100 %

## 2019-04-04 DIAGNOSIS — G89.29 CHRONIC PAIN OF LEFT UPPER EXTREMITY: Primary | ICD-10-CM

## 2019-04-04 DIAGNOSIS — K21.9 GASTROESOPHAGEAL REFLUX DISEASE WITHOUT ESOPHAGITIS: ICD-10-CM

## 2019-04-04 DIAGNOSIS — I67.9 CEREBROVASCULAR DISEASE: ICD-10-CM

## 2019-04-04 DIAGNOSIS — M81.0 OSTEOPOROSIS WITHOUT CURRENT PATHOLOGICAL FRACTURE, UNSPECIFIED OSTEOPOROSIS TYPE: ICD-10-CM

## 2019-04-04 DIAGNOSIS — M79.602 CHRONIC PAIN OF LEFT UPPER EXTREMITY: Primary | ICD-10-CM

## 2019-04-04 PROCEDURE — 99213 OFFICE O/P EST LOW 20 MIN: CPT | Performed by: FAMILY MEDICINE

## 2019-04-04 RX ORDER — ALENDRONATE SODIUM 70 MG/1
70 TABLET ORAL
Qty: 12 TABLET | Refills: 3 | Status: SHIPPED | OUTPATIENT
Start: 2019-04-04 | End: 2021-04-06 | Stop reason: SDUPTHER

## 2019-04-04 RX ORDER — SIMVASTATIN 20 MG
TABLET ORAL
Qty: 90 TABLET | Refills: 3 | Status: SHIPPED | OUTPATIENT
Start: 2019-04-04 | End: 2020-11-25 | Stop reason: SDUPTHER

## 2019-04-04 RX ORDER — PANTOPRAZOLE SODIUM 40 MG/1
40 TABLET, DELAYED RELEASE ORAL DAILY
Qty: 90 TABLET | Refills: 3 | Status: SHIPPED | OUTPATIENT
Start: 2019-04-04 | End: 2020-11-25 | Stop reason: SDUPTHER

## 2019-04-04 NOTE — PROGRESS NOTES
Subjective:      Patient ID: Leander Flores is a 75 y.o. male. HPI     Aching pain in left arm. Occurs at rest. Goes from thumb and first couple of fingers up toward arm. Sometimes involves the 3rd and 4th fingers too. Symptoms last for a few minutes. No neck pain. No numbness or tingling. No weakness of hand. No cp or sob. Takes Tylenol nightly which helps. Kiah Vivas underwent stress testing on 2/8/2019 due to chest pain that was reported at the end of January. This was described as a pressure that lasted for a couple of hours. Chest test was negative. Eileen Brunner recently. Slid to the right side while using his walker. Landed on right thigh. No head trauma or LOC. Thinks he may have bruise on thigh. Hips feel okay. Walking without pain. Soul like refill on PPI. Controls GERD.     Review of Systems    Patient Active Problem List   Diagnosis    Hypertension    Cerebrovascular disease    DDD (degenerative disc disease), lumbosacral    Displacement of lumbar intervertebral disc without myelopathy    Hyperlipidemia    Carotid artery stenosis    Conductive hearing loss of both ears    Impacted cerumen of both ears    Thrombocytopenia ([address]    Acute kidney injury ([address]    Gastroesophageal reflux disease without esophagitis    Right hemiparesis (HCC)    Vertigo    Ataxia    Hemiparesis due to old lacunar stroke ([address]    Vertigo, benign paroxysmal    HTN (hypertension), benign    CAD in native artery    Dyslipidemia    Age-related osteoporosis without current pathological fracture    Hyperglycemia       Outpatient Medications Marked as Taking for the 4/4/19 encounter (Office Visit) with Qi Hay MD   Medication Sig Dispense Refill    amLODIPine (NORVASC) 10 MG tablet TAKE 1 TABLET DAILY 90 tablet 3    lisinopril (PRINIVIL;ZESTRIL) 40 MG tablet TAKE 1 TABLET DAILY 90 tablet 3    simvastatin (ZOCOR) 20 MG tablet TAKE 1 TABLET NIGHTLY 90 tablet 1    docusate sodium (COLACE) 50 MG capsule Take 1 capsule by mouth daily 30 capsule 0    clopidogrel (PLAVIX) 75 MG tablet Take 1 tablet by mouth daily 30 tablet 0    alendronate (FOSAMAX) 70 MG tablet Take 1 tablet by mouth every 7 days 5 tablet 11    pantoprazole (PROTONIX) 40 MG tablet Take 1 tablet by mouth daily 90 tablet 3    clotrimazole-betamethasone (LOTRISONE) 1-0.05 % cream APPLY TOPICALLY TWICE DAILY 45 g 0       No Known Allergies    Social History     Tobacco Use    Smoking status: Never Smoker    Smokeless tobacco: Never Used   Substance Use Topics    Alcohol use: No     Alcohol/week: 0.0 oz       Objective:   /68 (Site: Left Upper Arm, Position: Sitting, Cuff Size: Medium Adult)   Pulse 72   Temp 98.3 °F (36.8 °C) (Temporal)   Wt 177 lb (80.3 kg)   SpO2 100%   BMI 29.45 kg/m²     Physical Exam   Constitutional: He is oriented to person, place, and time. He appears well-developed and well-nourished. No distress. Cardiovascular: Normal rate, regular rhythm and normal heart sounds. No murmur heard. Pulmonary/Chest: Effort normal and breath sounds normal. No respiratory distress. He has no wheezes. He has no rales. Musculoskeletal:   Bruising noted right upper lateral thigh   Neurological: He is alert and oriented to person, place, and time. Psychiatric: He has a normal mood and affect. His behavior is normal.       Assessment:      Diagnosis Orders   1. Chronic pain of left upper extremity  EMG   2. Cerebrovascular disease  simvastatin (ZOCOR) 20 MG tablet   3. Gastroesophageal reflux disease without esophagitis  pantoprazole (PROTONIX) 40 MG tablet   4. Osteoporosis without current pathological fracture, unspecified osteoporosis type  alendronate (FOSAMAX) 70 MG tablet       Plan:     EMG of left upper extremity to r/o carpal tunnel as cause of patient's left upper extremity. Continue statin and Plavix. Protonix refilled. Continue fosamax.

## 2019-04-04 NOTE — PROGRESS NOTES
Patient complains of left arm and groin pain for two months. It is gradually worsening. Left arm pain radiates to shoulder. Patient is currently taking tylenol that provides mild relief.

## 2019-04-07 ENCOUNTER — APPOINTMENT (OUTPATIENT)
Dept: CT IMAGING | Age: 74
End: 2019-04-07
Payer: MEDICARE

## 2019-04-07 ENCOUNTER — APPOINTMENT (OUTPATIENT)
Dept: GENERAL RADIOLOGY | Age: 74
End: 2019-04-07
Payer: MEDICARE

## 2019-04-07 ENCOUNTER — HOSPITAL ENCOUNTER (EMERGENCY)
Age: 74
Discharge: HOME | End: 2019-04-07
Attending: EMERGENCY MEDICINE
Payer: MEDICARE

## 2019-04-07 VITALS
HEIGHT: 66 IN | BODY MASS INDEX: 27.32 KG/M2 | OXYGEN SATURATION: 95 % | RESPIRATION RATE: 18 BRPM | TEMPERATURE: 96.6 F | SYSTOLIC BLOOD PRESSURE: 108 MMHG | HEART RATE: 68 BPM | DIASTOLIC BLOOD PRESSURE: 74 MMHG | WEIGHT: 170 LBS

## 2019-04-07 DIAGNOSIS — R31.9 HEMATURIA, UNSPECIFIED TYPE: ICD-10-CM

## 2019-04-07 DIAGNOSIS — R07.9 CHEST PAIN, UNSPECIFIED TYPE: Primary | ICD-10-CM

## 2019-04-07 LAB
A/G RATIO: 1.5 (ref 1.1–2.2)
ALBUMIN SERPL-MCNC: 4.6 G/DL (ref 3.4–5)
ALP BLD-CCNC: 53 U/L (ref 40–129)
ALT SERPL-CCNC: 19 U/L (ref 10–40)
ANION GAP SERPL CALCULATED.3IONS-SCNC: 14 MMOL/L (ref 3–16)
AST SERPL-CCNC: 21 U/L (ref 15–37)
BASOPHILS ABSOLUTE: 0 K/UL (ref 0–0.2)
BASOPHILS RELATIVE PERCENT: 0.5 %
BILIRUB SERPL-MCNC: 0.5 MG/DL (ref 0–1)
BILIRUBIN URINE: NEGATIVE
BLOOD, URINE: ABNORMAL
BUN BLDV-MCNC: 15 MG/DL (ref 7–20)
CALCIUM SERPL-MCNC: 9.8 MG/DL (ref 8.3–10.6)
CHLORIDE BLD-SCNC: 102 MMOL/L (ref 99–110)
CLARITY: CLEAR
CO2: 23 MMOL/L (ref 21–32)
COLOR: YELLOW
CREAT SERPL-MCNC: 1.1 MG/DL (ref 0.8–1.3)
D DIMER: 449 NG/ML DDU (ref 0–229)
EOSINOPHILS ABSOLUTE: 0 K/UL (ref 0–0.6)
EOSINOPHILS RELATIVE PERCENT: 0.7 %
EPITHELIAL CELLS, UA: 0 /HPF (ref 0–5)
GFR AFRICAN AMERICAN: >60
GFR NON-AFRICAN AMERICAN: >60
GLOBULIN: 3.1 G/DL
GLUCOSE BLD-MCNC: 111 MG/DL (ref 70–99)
GLUCOSE URINE: NEGATIVE MG/DL
HCT VFR BLD CALC: 48.3 % (ref 40.5–52.5)
HEMOGLOBIN: 16.3 G/DL (ref 13.5–17.5)
HYALINE CASTS: 0 /LPF (ref 0–8)
INR BLD: 0.97 (ref 0.86–1.14)
KETONES, URINE: NEGATIVE MG/DL
LEUKOCYTE ESTERASE, URINE: NEGATIVE
LYMPHOCYTES ABSOLUTE: 0.8 K/UL (ref 1–5.1)
LYMPHOCYTES RELATIVE PERCENT: 16.3 %
MCH RBC QN AUTO: 31.7 PG (ref 26–34)
MCHC RBC AUTO-ENTMCNC: 33.8 G/DL (ref 31–36)
MCV RBC AUTO: 93.7 FL (ref 80–100)
MICROSCOPIC EXAMINATION: YES
MONOCYTES ABSOLUTE: 0.6 K/UL (ref 0–1.3)
MONOCYTES RELATIVE PERCENT: 12.3 %
NEUTROPHILS ABSOLUTE: 3.7 K/UL (ref 1.7–7.7)
NEUTROPHILS RELATIVE PERCENT: 70.2 %
NITRITE, URINE: NEGATIVE
PDW BLD-RTO: 13.4 % (ref 12.4–15.4)
PH UA: 6 (ref 5–8)
PLATELET # BLD: 102 K/UL (ref 135–450)
PMV BLD AUTO: 8.8 FL (ref 5–10.5)
POTASSIUM REFLEX MAGNESIUM: 3.8 MMOL/L (ref 3.5–5.1)
PRO-BNP: 123 PG/ML (ref 0–449)
PROTEIN UA: ABNORMAL MG/DL
PROTHROMBIN TIME: 11.1 SEC (ref 9.8–13)
RBC # BLD: 5.15 M/UL (ref 4.2–5.9)
RBC UA: 22 /HPF (ref 0–4)
SODIUM BLD-SCNC: 139 MMOL/L (ref 136–145)
SPECIFIC GRAVITY UA: 1.03 (ref 1–1.03)
TOTAL PROTEIN: 7.7 G/DL (ref 6.4–8.2)
TROPONIN: <0.01 NG/ML
TROPONIN: <0.01 NG/ML
URINE REFLEX TO CULTURE: ABNORMAL
URINE TYPE: ABNORMAL
UROBILINOGEN, URINE: 0.2 E.U./DL
WBC # BLD: 5.2 K/UL (ref 4–11)
WBC UA: 1 /HPF (ref 0–5)

## 2019-04-07 PROCEDURE — 85025 COMPLETE CBC W/AUTO DIFF WBC: CPT

## 2019-04-07 PROCEDURE — 81001 URINALYSIS AUTO W/SCOPE: CPT

## 2019-04-07 PROCEDURE — 80053 COMPREHEN METABOLIC PANEL: CPT

## 2019-04-07 PROCEDURE — 71260 CT THORAX DX C+: CPT

## 2019-04-07 PROCEDURE — 93005 ELECTROCARDIOGRAM TRACING: CPT | Performed by: EMERGENCY MEDICINE

## 2019-04-07 PROCEDURE — 71046 X-RAY EXAM CHEST 2 VIEWS: CPT

## 2019-04-07 PROCEDURE — 85379 FIBRIN DEGRADATION QUANT: CPT

## 2019-04-07 PROCEDURE — 6360000004 HC RX CONTRAST MEDICATION: Performed by: EMERGENCY MEDICINE

## 2019-04-07 PROCEDURE — 6370000000 HC RX 637 (ALT 250 FOR IP): Performed by: EMERGENCY MEDICINE

## 2019-04-07 PROCEDURE — 83880 ASSAY OF NATRIURETIC PEPTIDE: CPT

## 2019-04-07 PROCEDURE — 99285 EMERGENCY DEPT VISIT HI MDM: CPT

## 2019-04-07 PROCEDURE — 85610 PROTHROMBIN TIME: CPT

## 2019-04-07 PROCEDURE — 84484 ASSAY OF TROPONIN QUANT: CPT

## 2019-04-07 RX ORDER — ASPIRIN 81 MG/1
324 TABLET, CHEWABLE ORAL ONCE
Status: COMPLETED | OUTPATIENT
Start: 2019-04-07 | End: 2019-04-07

## 2019-04-07 RX ORDER — ALENDRONATE SODIUM 70 MG/1
70 TABLET ORAL
COMMUNITY
End: 2019-04-15

## 2019-04-07 RX ADMIN — ASPIRIN 81 MG 324 MG: 81 TABLET ORAL at 13:03

## 2019-04-07 RX ADMIN — NITROGLYCERIN 1 INCH: 20 OINTMENT TOPICAL at 13:04

## 2019-04-07 RX ADMIN — IOPAMIDOL 75 ML: 755 INJECTION, SOLUTION INTRAVENOUS at 14:19

## 2019-04-07 ASSESSMENT — PAIN DESCRIPTION - PAIN TYPE: TYPE: ACUTE PAIN

## 2019-04-07 ASSESSMENT — PAIN DESCRIPTION - ORIENTATION: ORIENTATION: MID;LEFT;RIGHT

## 2019-04-07 ASSESSMENT — PAIN SCALES - GENERAL: PAINLEVEL_OUTOF10: 5

## 2019-04-07 ASSESSMENT — PAIN DESCRIPTION - LOCATION: LOCATION: CHEST

## 2019-04-07 NOTE — ED NOTES
Reviewed discharge instructions with patient. Patient denied any questions. Patient wheeled out of ED by nursing student.       Buffy Pulido  04/07/19 3324

## 2019-04-07 NOTE — ED NOTES
Patient stood at the bedside with assistance of nurse on each side and walker to urinate per urinal.  Urine in urinal noted clear yellow, last drips of urine which landed on the floor and outside of urinal appeared pink in color. Specimen collected.      Yvonne Garcia RN  04/07/19 5320

## 2019-04-07 NOTE — ED PROVIDER NOTES
[facility] Emergency Department    CHIEF COMPLAINT  Chief Complaint   Patient presents with    Chest Pain     chest pain last night and for 2 hours at [facility] today. chest pain mid/left/right chest.  pt reports some shortness of breath also, states it john hurts to breath. pt transported by [facility]. pt had stress test a month or two ago and it was alright per pt. HISTORY OF PRESENT ILLNESS  Sebastian Ruiz is a 75 y.o. male  who presents to the ED complaining of stabbing chest pain onset last night that was brief and subsided after a little bit. The symptoms were mostly on the left side but also in the middle. Not really as much on the right side as per the triage note. He felt SOB sometimes as well. He reports the pain is not associated with any exacerbating or alleviating factors except sometimes rest helps and deep breathing makes it worse. Pain happened again at [facility] today lasting longer and more severe. He did have a negative stress test in February. He reports a history of stroke. Saw PCP on 4/4/19 for aching in the L arm. Pt unsure what further diagnostics were scheduled because of this. Thinks it might be an EMG. He has never had a heart cath. No leg swelling, abd pain, n/v/d or other complaints. No fevers. No other complaints, modifying factors or associated symptoms. I have reviewed the following from the nursing documentation. Past Medical History:   Diagnosis Date    Cerebrovascular disease 2007    Right leg weakness    Hyperlipidemia     Hypertension     Risk for falls 06/09/2017    YO [age]    Unspecified cerebral artery occlusion with cerebral infarction      Past Surgical History:   Procedure Laterality Date    HERNIA REPAIR      NOSE SURGERY       Family History   Problem Relation Age of Onset    Heart Disease Father 64     Social History     Socioeconomic History    Marital status:       Spouse name: Not on file    Number of children: Not on file    Years of education: Not on file    Highest education level: Not on file   Occupational History    Occupation: Retired   Social Needs    Financial resource strain: Not on file    Food insecurity:     Worry: Not on file     Inability: Not on file   Spazzles needs:     Medical: Not on file     Non-medical: Not on file   Tobacco Use    Smoking status: Never Smoker    Smokeless tobacco: Never Used   Substance and Sexual Activity    Alcohol use: No     Alcohol/week: 0.0 oz    Drug use: No    Sexual activity: Not on file   Lifestyle    Physical activity:     Days per week: Not on file     Minutes per session: Not on file    Stress: Not on file   Relationships    Social connections:     Talks on phone: Not on file     Gets together: Not on file     Attends Church service: Not on file     Active member of club or organization: Not on file     Attends meetings of clubs or organizations: Not on file     Relationship status: Not on file    Intimate partner violence:     Fear of current or ex partner: Not on file     Emotionally abused: Not on file     Physically abused: Not on file     Forced sexual activity: Not on file   Other Topics Concern    Not on file   Social History Narrative    Lives at home. Wife in nursing home. No current facility-administered medications for this encounter.       Current Outpatient Medications   Medication Sig Dispense Refill    alendronate (FOSAMAX) 70 MG tablet Take 70 mg by mouth every 7 days      simvastatin (ZOCOR) 20 MG tablet TAKE 1 TABLET NIGHTLY 90 tablet 3    pantoprazole (PROTONIX) 40 MG tablet Take 1 tablet by mouth daily 90 tablet 3    alendronate (FOSAMAX) 70 MG tablet Take 1 tablet by mouth every 7 days 12 tablet 3    amLODIPine (NORVASC) 10 MG tablet TAKE 1 TABLET DAILY 90 tablet 3    lisinopril (PRINIVIL;ZESTRIL) 40 MG tablet TAKE 1 TABLET DAILY 90 tablet 3    clopidogrel (PLAVIX) 75 MG tablet Take 1 tablet by mouth daily 30 tablet 0    clotrimazole-betamethasone (LOTRISONE) 1-0.05 % cream APPLY TOPICALLY TWICE DAILY 45 g 0    zoster recombinant adjuvanted vaccine (SHINGRIX) 50 MCG/0.5ML SUSR injection 0.5mL IM x 1. Followed by 0.5mL IM 2-6 months after dose #1. 0.5 mL 0    docusate sodium (COLACE) 50 MG capsule Take 1 capsule by mouth daily 30 capsule 0     No Known Allergies    REVIEW OF SYSTEMS  10 systems reviewed, pertinent positives per HPI otherwise noted to be negative. PHYSICAL EXAM  /79   Pulse 77   Temp 96.9 °F (36.1 °C) (Oral)   Resp 18   Ht 5' 6\" (1.676 m)   Wt 170 lb (77.1 kg)   SpO2 95%   BMI 27.44 kg/m²    GENERAL APPEARANCE: Awake and alert. Cooperative. No distress. HENT: Normocephalic. Atraumatic. Mucous membranes are moist.  NECK: Supple. EYES: PERRL. EOM's grossly intact. HEART/CHEST: RRR. No murmurs. No chest wall tenderness. LUNGS: Respirations unlabored. CTAB. Good air exchange. Speaking comfortably in full sentences. ABDOMEN: No tenderness. Soft. Non-distended. No masses. No organomegaly. No guarding or rebound. Normal bowel sounds throughout. MUSCULOSKELETAL: No extremity edema. Compartments soft. No deformity. No tenderness in the extremities. All extremities neurovascularly intact. SKIN: Warm and dry. No acute rashes. NEUROLOGICAL: Alert and oriented. CN's 2-12 intact. No gross facial drooping. Strength 5/5, sensation intact. 2 plus DTR's in knees bilaterally. Gait normal.  PSYCHIATRIC: Normal mood and affect. LABS  I have reviewed all labs for this visit.    Results for orders placed or performed during the hospital encounter of 04/07/19   CBC auto differential   Result Value Ref Range    WBC 5.2 4.0 - 11.0 K/uL    RBC 5.15 4.20 - 5.90 M/uL    Hemoglobin 16.3 13.5 - 17.5 g/dL    Hematocrit 48.3 40.5 - 52.5 %    MCV 93.7 80.0 - 100.0 fL    MCH 31.7 26.0 - 34.0 pg    MCHC 33.8 31.0 - 36.0 g/dL    RDW 13.4 12.4 - 15.4 %    Platelets 022 (L) 891 - 450 K/uL MPV 8.8 5.0 - 10.5 fL    Neutrophils % 70.2 %    Lymphocytes % 16.3 %    Monocytes % 12.3 %    Eosinophils % 0.7 %    Basophils % 0.5 %    Neutrophils # 3.7 1.7 - 7.7 K/uL    Lymphocytes # 0.8 (L) 1.0 - 5.1 K/uL    Monocytes # 0.6 0.0 - 1.3 K/uL    Eosinophils # 0.0 0.0 - 0.6 K/uL    Basophils # 0.0 0.0 - 0.2 K/uL   Comprehensive Metabolic Panel w/ Reflex to MG   Result Value Ref Range    Sodium 139 136 - 145 mmol/L    Potassium reflex Magnesium 3.8 3.5 - 5.1 mmol/L    Chloride 102 99 - 110 mmol/L    CO2 23 21 - 32 mmol/L    Anion Gap 14 3 - 16    Glucose 111 (H) 70 - 99 mg/dL    BUN 15 7 - 20 mg/dL    CREATININE 1.1 0.8 - 1.3 mg/dL    GFR Non-African American >60 >60    GFR African American >60 >60    Calcium 9.8 8.3 - 10.6 mg/dL    Total Protein 7.7 6.4 - 8.2 g/dL    Alb 4.6 3.4 - 5.0 g/dL    Albumin/Globulin Ratio 1.5 1.1 - 2.2    Total Bilirubin 0.5 0.0 - 1.0 mg/dL    Alkaline Phosphatase 53 40 - 129 U/L    ALT 19 10 - 40 U/L    AST 21 15 - 37 U/L    Globulin 3.1 g/dL   Protime-INR   Result Value Ref Range    Protime 11.1 9.8 - 13.0 sec    INR 0.97 0.86 - 1.14   Troponin   Result Value Ref Range    Troponin <0.01 <0.01 ng/mL   Brain Natriuretic Peptide   Result Value Ref Range    Pro- 0 - 449 pg/mL   D-dimer, quantitative   Result Value Ref Range    D-Dimer, Quant 449 (H) 0 - 229 ng/mL DDU   Urinalysis Reflex to Culture   Result Value Ref Range    Color, UA YELLOW Straw/Yellow    Clarity, UA Clear Clear    Glucose, Ur Negative Negative mg/dL    Bilirubin Urine Negative Negative    Ketones, Urine Negative Negative mg/dL    Specific Gravity, UA 1.029 1.005 - 1.030    Blood, Urine MODERATE (A) Negative    pH, UA 6.0 5.0 - 8.0    Protein, UA TRACE (A) Negative mg/dL    Urobilinogen, Urine 0.2 <2.0 E.U./dL    Nitrite, Urine Negative Negative    Leukocyte Esterase, Urine Negative Negative    Microscopic Examination YES     Urine Reflex to Culture Not Indicated     Urine Type Not Specified    Troponin   Result Value Ref Range    Troponin <0.01 <0.01 ng/mL   Microscopic Urinalysis   Result Value Ref Range    Hyaline Casts, UA 0 0 - 8 /LPF    WBC, UA 1 0 - 5 /HPF    RBC, UA 22 (H) 0 - 4 /HPF    Epi Cells 0 0 - 5 /HPF   EKG 12 Lead   Result Value Ref Range    Ventricular Rate 74 BPM    Atrial Rate 74 BPM    P-R Interval 182 ms    QRS Duration 76 ms    Q-T Interval 398 ms    QTc Calculation (Bazett) 441 ms    P Axis 69 degrees    R Axis 11 degrees    T Axis 16 degrees    Diagnosis       Normal sinus rhythmPossible Anterior infarct , age undeterminedAbnormal ECG     The 12 lead EKG was interpreted by me as follows:  Rate: normal with a rate of 74  Rhythm: sinus  Axis: normal  Intervals: normal [place], narrow QRS, normal QTc  ST segments: no ST elevations or depressions  T waves: no abnormal inversions  Non-specific T wave changes: present  Prior EKG comparison: EKG dated 1/24/19 is not significantly different    RADIOLOGY    Xr Chest Standard (2 Vw)    Result Date: 4/7/2019  EXAMINATION: TWO VIEWS OF THE CHEST 4/7/2019 12:28 pm COMPARISON: May 17, 2017 HISTORY: ORDERING SYSTEM PROVIDED HISTORY: chest pain TECHNOLOGIST PROVIDED HISTORY: Reason for exam:->chest pain Ordering Physician Provided Reason for Exam: chest pain last night and for 2 hours at [employer] today. chest pain mid/left/right chest. pt reports some shortness of breath also, states it john hurts to breath. pt transported by [facility]. pt had stress test a month or two ago and it was alright per pt Acuity: Acute FINDINGS: Cardiac silhouette is normal in size. Lungs appear clear. No acute bony abnormality. No acute findings     Ct Chest Pulmonary Embolism W Contrast    Result Date: 4/7/2019  EXAMINATION: CTA OF THE CHEST 4/7/2019 2:10 pm TECHNIQUE: CTA of the chest was performed after the administration of intravenous contrast.  Multiplanar reformatted images are provided for review. MIP images are provided for review.  Dose modulation, iterative reconstruction, and/or weight based adjustment of the mA/kV was utilized to reduce the radiation dose to as low as reasonably achievable. COMPARISON: None. HISTORY: ORDERING SYSTEM PROVIDED HISTORY: CP SOB +ddnico TECHNOLOGIST PROVIDED HISTORY: Ordering Physician Provided Reason for Exam: CP SOB +ddimer Acuity: Unknown Type of Exam: Unknown FINDINGS: Pulmonary Arteries: Pulmonary arteries are adequately opacified for evaluation. No evidence of intraluminal filling defect to suggest pulmonary embolism. Main pulmonary artery is normal in caliber. Mediastinum: No evidence of mediastinal lymphadenopathy. The heart and pericardium demonstrate no acute abnormality. There is no acute abnormality of the thoracic aorta. Lungs/pleura: The lungs are without acute process. No focal consolidation or pulmonary edema. No evidence of pleural effusion or pneumothorax. Upper Abdomen: Limited images of the upper abdomen are unremarkable. Soft Tissues/Bones: Compression deformity of L1 with loss of approximately 60% vertebral body height. Mild retropulsion of the posterosuperior endplate with mild narrowing of the central canal.     No evidence of pulmonary embolism or acute pulmonary abnormality. Compression deformity of L1 with loss of approximately 60% vertebral body height. Mild retropulsion of the posterosuperior endplate with mild narrowing of the central canal.  This is unchanged since 2016. ED COURSE/MDM  Patient seen and evaluated. Old records reviewed. Labs and imaging reviewed and results discussed with patient. After initial evaluation, differential diagnostic considerations included: acute coronary syndrome, pulmonary embolism, COPD/asthma, pneumonia, musculoskeletal, reflux/PUD/gastritis, pneumothorax, CHF, thoracic aortic dissection, anxiety    The patient's ED workup was notable for episodic chest discomfort improved on arrival here with reassuring workup. Initial trop neg, EKG not acutely ischemic. Negative stress test 2 months ago. CTPA showed no PE, only an old L1 fracture which appears stable and is not acutely bothering the patient. Incidentally noted is hematuria. This can be followed up with PCP, as can the chronic LUE pain as per PCP plan. Given his chest discomfort, I consulted and spoke with Dr. Stanislav Whalen from cardiology about the patient's ED history, physical, workup, and course so far. Recommendations from this consultant included no need to pursue C at this point given his recent stress testing. Agreeable to delta troponin and outpatient f/u. Second troponin was negative. During the patient's ED course, the patient was given:  Medications   aspirin chewable tablet 324 mg (324 mg Oral Given 4/7/19 1303)   nitroglycerin (NITRO-BID) 2 % ointment 1 inch (1 inch Topical Given 4/7/19 1304)   iopamidol (ISOVUE-370) 76 % injection 75 mL (75 mLs Intravenous Given 4/7/19 1419)        CLINICAL IMPRESSION  1. Chest pain, unspecified type    2. Hematuria, unspecified type        Blood pressure 123/79, pulse 77, temperature 96.9 °F (36.1 °C), temperature source Oral, resp. rate 18, height 5' 6\" (1.676 m), weight 170 lb (77.1 kg), SpO2 95 %. DISPOSITION  Miracle Villaseñor was discharged home in stable condition. I have discussed the findings of today's workup with the patient and addressed the patient's questions and concerns. Important warning signs as well as new or worsening symptoms which would necessitate immediate return to the ED were discussed. The plan is to discharge from the ED at this time, and the patient is in stable condition. The patient acknowledged understanding is agreeable with this plan.     Follow-up with:  [facility], [address] [place] [address] [address]  [phone]    Schedule an appointment as soon as possible for a visit in 1 week  For symptom re-evaluation    Cindy Lawrence MD  [address] [place] [address], [address] [place] [address],[address] [address]  [phone]    Schedule an appointment as soon as possible for a visit in 2 days  For symptom re-evaluation    [facility] Emergency Department  [address] [facility]  [phone]  Go to   If symptoms worsen      DISCLAIMER: This chart was created using Dragon dictation software. Efforts were made by me to ensure accuracy, however some errors may be present due to limitations of this technology and occasionally words are not transcribed correctly.         Rojelio Caldera MD  04/07/19 5039

## 2019-04-07 NOTE — ED NOTES
Bed: 12  Expected date:   Expected time:   Means of arrival: [facility]  Comments:  DIRTY; M31     Wayne Le  04/07/19 1219

## 2019-04-08 ENCOUNTER — TELEPHONE (OUTPATIENT)
Dept: FAMILY MEDICINE CLINIC | Age: 74
End: 2019-04-08

## 2019-04-08 LAB
EKG ATRIAL RATE: 74 BPM
EKG DIAGNOSIS: NORMAL
EKG P AXIS: 69 DEGREES
EKG P-R INTERVAL: 182 MS
EKG Q-T INTERVAL: 398 MS
EKG QRS DURATION: 76 MS
EKG QTC CALCULATION (BAZETT): 441 MS
EKG R AXIS: 11 DEGREES
EKG T AXIS: 16 DEGREES
EKG VENTRICULAR RATE: 74 BPM

## 2019-04-08 PROCEDURE — 93010 ELECTROCARDIOGRAM REPORT: CPT | Performed by: INTERNAL MEDICINE

## 2019-04-09 ENCOUNTER — TELEPHONE (OUTPATIENT)
Dept: CARDIOLOGY CLINIC | Age: 74
End: 2019-04-09

## 2019-04-09 NOTE — TELEPHONE ENCOUNTER
Pt was incorrectly scheduled w/BENJAMIN for tomorrow, 4/9/19 @ 8;30am.  BENJAMIN spoke to ER  only because Racheal Garcia was on call. This recorder spoke to pt and made him aware he was scheduled w/wrong type of cardiologist.  He needs to see interventionalist for CP not Electrophysiologist.     Advised pt that he could see DDW same day at 1:45pm.  PT asked if I would call his sister Bismark Lopez to see if this will work with her schedule as she is his transportation. This recorder then called Bismark Lopez and left very detailed message asking to return call to office either before 5pm today or after 8 am tomorrow and confirm she received message re: pt's appt change and confirm if ok. This recorder then called pt back stating I had to LVM. Again, this recorder reintegrated that new appt would be with DDW at 1:45pm 4/10/19. Pt stated time would not work as he has people coming at 2:30pm to start remodel in his home. I advised pt to speak with his sister and call us back in morning and BENJAMIN appt is cancelled. OSITOI.

## 2019-04-15 ENCOUNTER — OFFICE VISIT (OUTPATIENT)
Dept: FAMILY MEDICINE CLINIC | Age: 74
End: 2019-04-15
Payer: MEDICARE

## 2019-04-15 VITALS
DIASTOLIC BLOOD PRESSURE: 83 MMHG | BODY MASS INDEX: 27.6 KG/M2 | HEART RATE: 55 BPM | OXYGEN SATURATION: 97 % | SYSTOLIC BLOOD PRESSURE: 130 MMHG | WEIGHT: 171 LBS

## 2019-04-15 DIAGNOSIS — R31.21 ASYMPTOMATIC MICROSCOPIC HEMATURIA: ICD-10-CM

## 2019-04-15 DIAGNOSIS — R07.9 CHEST PAIN, UNSPECIFIED TYPE: Primary | ICD-10-CM

## 2019-04-15 PROCEDURE — 1111F DSCHRG MED/CURRENT MED MERGE: CPT | Performed by: FAMILY MEDICINE

## 2019-04-15 PROCEDURE — 99213 OFFICE O/P EST LOW 20 MIN: CPT | Performed by: FAMILY MEDICINE

## 2019-04-15 NOTE — PATIENT INSTRUCTIONS
Urology    [facility]  MD Pj Sandy MD Mac Maw, MD Biagio Dawes, MD  [facility]  [address] [facility]  [phone]

## 2019-04-25 ENCOUNTER — OFFICE VISIT (OUTPATIENT)
Dept: CARDIOLOGY CLINIC | Age: 74
End: 2019-04-25
Payer: MEDICARE

## 2019-04-25 VITALS
SYSTOLIC BLOOD PRESSURE: 118 MMHG | HEART RATE: 68 BPM | HEIGHT: 66 IN | WEIGHT: 175.12 LBS | BODY MASS INDEX: 28.14 KG/M2 | DIASTOLIC BLOOD PRESSURE: 70 MMHG

## 2019-04-25 DIAGNOSIS — I10 ESSENTIAL HYPERTENSION: Chronic | ICD-10-CM

## 2019-04-25 DIAGNOSIS — I25.10 CAD IN NATIVE ARTERY: Primary | ICD-10-CM

## 2019-04-25 DIAGNOSIS — I65.23 BILATERAL CAROTID ARTERY STENOSIS: Chronic | ICD-10-CM

## 2019-04-25 DIAGNOSIS — E78.5 HYPERLIPIDEMIA, UNSPECIFIED HYPERLIPIDEMIA TYPE: Chronic | ICD-10-CM

## 2019-04-25 DIAGNOSIS — R07.9 CHEST PAIN, UNSPECIFIED TYPE: ICD-10-CM

## 2019-04-25 PROCEDURE — 99203 OFFICE O/P NEW LOW 30 MIN: CPT | Performed by: INTERNAL MEDICINE

## 2019-04-25 RX ORDER — NITROGLYCERIN 0.4 MG/1
0.4 TABLET SUBLINGUAL EVERY 5 MIN PRN
Qty: 25 TABLET | Refills: 3 | Status: SHIPPED | OUTPATIENT
Start: 2019-04-25 | End: 2020-06-26

## 2019-04-28 NOTE — PROGRESS NOTES
Subjective:      Patient ID: Meño Rosario is a 75 y.o. male. HPI    Patient presents for ER follow-up after visit on 4/7/2018 for chest pain. Pain began when he was at [employer] and was associated with shortness of breath. No diaphoresis or palpitations. . A friend at [employer] called EMS for transportation to the emergency room. Patient underwent a stress test in February was negative. At that time, he was complaining of chest pain and aching in the left arm. Next step planned in workup was EMG. In the emergency room, patient's troponin was negative ×2. He underwent a CT of the chest that showed no PE. Cardiology was called from the ER and recommended outpatient follow-up. Incidentally found was hematuria. Today, patient denies chest pain and shortness of breath. He is feeling his usual state of health.  His sister is here and helps quite a bit by getting him to appointments    Review of Systems    Patient Active Problem List   Diagnosis    Hypertension    Cerebrovascular disease    DDD (degenerative disc disease), lumbosacral    Displacement of lumbar intervertebral disc without myelopathy    Hyperlipidemia    Carotid artery stenosis    Conductive hearing loss of both ears    Thrombocytopenia ([address]    Acute kidney injury ([address]    Gastroesophageal reflux disease without esophagitis    Right hemiparesis ([address]    Ataxia    Hemiparesis due to old lacunar stroke ([address]    Vertigo, benign paroxysmal    CAD in native artery    Age-related osteoporosis without current pathological fracture    Hyperglycemia    Chest pain       Outpatient Medications Marked as Taking for the 4/15/19 encounter (Office Visit) with Dayana Wall MD   Medication Sig Dispense Refill    simvastatin (ZOCOR) 20 MG tablet TAKE 1 TABLET NIGHTLY 90 tablet 3    pantoprazole (PROTONIX) 40 MG tablet Take 1 tablet by mouth daily 90 tablet 3    alendronate (FOSAMAX) 70 MG tablet Take 1 tablet by mouth every 7 days 12 tablet 3    amLODIPine (NORVASC) 10 MG tablet TAKE 1 TABLET DAILY 90 tablet 3    lisinopril (PRINIVIL;ZESTRIL) 40 MG tablet TAKE 1 TABLET DAILY 90 tablet 3    docusate sodium (COLACE) 50 MG capsule Take 1 capsule by mouth daily 30 capsule 0    clopidogrel (PLAVIX) 75 MG tablet Take 1 tablet by mouth daily 30 tablet 0    clotrimazole-betamethasone (LOTRISONE) 1-0.05 % cream APPLY TOPICALLY TWICE DAILY 45 g 0       No Known Allergies    Social History     Tobacco Use    Smoking status: Never Smoker    Smokeless tobacco: Never Used   Substance Use Topics    Alcohol use: No     Alcohol/week: 0.0 oz       Objective:   /83   Pulse 55   Wt 171 lb (77.6 kg)   SpO2 97%   BMI 27.60 kg/m²     Physical Exam   Constitutional: He is oriented to person, place, and time. He appears well-developed and well-nourished. No distress. Cardiovascular: Normal rate, regular rhythm and normal heart sounds. No murmur heard. Pulmonary/Chest: Effort normal and breath sounds normal. No respiratory distress. He has no wheezes. He has no rales. Neurological: He is alert and oriented to person, place, and time. Psychiatric: He has a normal mood and affect. His behavior is normal.       Assessment:      Diagnosis Orders   1. Chest pain, unspecified type  DC DISCHARGE MEDS RECONCILED W/ CURRENT OUTPATIENT MED LIST   2. Asymptomatic microscopic hematuria         Plan:     Follow-up scheduled with cardiology for chest pain. Patient needs to see urology for microscopic hematuria.

## 2019-04-30 ENCOUNTER — HOSPITAL ENCOUNTER (OUTPATIENT)
Dept: NEUROLOGY | Age: 74
Discharge: HOME | End: 2019-04-30
Payer: MEDICARE

## 2019-04-30 DIAGNOSIS — M79.602 CHRONIC PAIN OF LEFT UPPER EXTREMITY: ICD-10-CM

## 2019-04-30 DIAGNOSIS — G89.29 CHRONIC PAIN OF LEFT UPPER EXTREMITY: ICD-10-CM

## 2019-04-30 PROCEDURE — 95860 NEEDLE EMG 1 EXTREMITY: CPT

## 2019-04-30 PROCEDURE — 95909 NRV CNDJ TST 5-6 STUDIES: CPT | Performed by: PSYCHIATRY & NEUROLOGY

## 2019-04-30 PROCEDURE — 95886 MUSC TEST DONE W/N TEST COMP: CPT | Performed by: PSYCHIATRY & NEUROLOGY

## 2019-04-30 PROCEDURE — 95909 NRV CNDJ TST 5-6 STUDIES: CPT

## 2019-04-30 NOTE — PROCEDURES
[facility] [address]                     [address] [facility], [address]                             ELECTROMYOGRAM REPORT    PATIENT NAME: Eduar CARRANZA                   :        1945  MED REC NO:   [id number]                          ROOM:  ACCOUNT NO:   [de-identified]                           ADMIT DATE: 2019  PROVIDER:     Martinez Milan MD    DATE OF EM2019    REASON FOR EMG:  Left arm pain. SUMMARY:  The left median motor and sensory nerve studies had prolonged  distal latencies. The left ulnar motor had a moderately severe slowing  of conduction velocity across the elbow. The left ulnar and radial  sensory nerve studies were normal.  Needle EMG of several muscles in the  left upper extremity including the cervical paraspinal muscles was  normal.    EMG DIAGNOSES:  1.  Moderately severe left median nerve lesion at the wrist (carpal  tunnel syndrome). 2.  Moderately severe left ulnar nerve lesion at the elbow. 3.  No electrophysiological evidence for cervical radiculopathy at this  time.         Petrona Ferris MD    D: 2019 9:57:56       T: 2019 10:06:26     [id number]  Job#: [id number]     Doc#: [id number]    CC:

## 2019-05-02 ENCOUNTER — TELEPHONE (OUTPATIENT)
Dept: FAMILY MEDICINE CLINIC | Age: 74
End: 2019-05-02

## 2019-05-02 DIAGNOSIS — G56.02 LEFT CARPAL TUNNEL SYNDROME: ICD-10-CM

## 2019-05-02 DIAGNOSIS — G56.22 IMPINGEMENT OF LEFT ULNAR NERVE: Primary | ICD-10-CM

## 2019-06-12 ENCOUNTER — OFFICE VISIT (OUTPATIENT)
Dept: ORTHOPEDIC SURGERY | Age: 74
End: 2019-06-12
Payer: MEDICARE

## 2019-06-12 VITALS
HEART RATE: 59 BPM | HEIGHT: 66 IN | WEIGHT: 173 LBS | BODY MASS INDEX: 27.8 KG/M2 | DIASTOLIC BLOOD PRESSURE: 77 MMHG | SYSTOLIC BLOOD PRESSURE: 138 MMHG

## 2019-06-12 DIAGNOSIS — G56.02 CARPAL TUNNEL SYNDROME OF LEFT WRIST: Primary | ICD-10-CM

## 2019-06-12 DIAGNOSIS — G56.22 CUBITAL TUNNEL SYNDROME ON LEFT: ICD-10-CM

## 2019-06-12 PROCEDURE — 99203 OFFICE O/P NEW LOW 30 MIN: CPT | Performed by: ORTHOPAEDIC SURGERY

## 2019-06-12 NOTE — LETTER
CONSENT TO OPERATION  AND/OR OTHER PROCEDURE(S)          PATIENT : Miguelito Cali   YOB: 1945    DATE : 6/12/19          1. I request and consent that Dr. Gemini Cueto. Miya Perkins,  and/or his associates or assistants perform an operation and/or procedures on the above patient at  [place] [address], to treat the condition(s) which appear indicated by the diagnostic studies already performed. I have been told that in general terms the nature, purpose and reasonable expectations of the operation and/or procedure(s) are:      Left Ulnar Nerve Decompression  (at Elbow) &  Left Carpal Tunnel Release       2. It has been explained to me by the informing physician that during the course of the operation and/or procedure(s) unforeseen conditions may be revealed that necessitate an extension of the original operation and/or procedure(s) or different operation and/or procedures than those set forth in Paragraph 1. I therefore authorize and request that my physician and/or his associates or assistants perform such operations and/or procedures as are necessary and desirable in the exercise of professional judgment. The authority granted under this Paragraph 2 shall extend to all conditions that require treatment and are known to my physician at the time the operation is commenced. 3. I have been made aware by the informing physician of certain risks and consequences that are associated with the operation and/or procedure(s) described in Paragraph 1, the reasonable alternative methods or treatment, the possible consequences, the possibility that the operation and/or procedure(s) may be unsuccessful and the possibility of complications. I understand the reasonably known risks to be:      ? Bleeding  ? Infection  ? Poor Healing  ? Possible Damage to Nerve, Vessel, Tendon/Muscle or Bone  ? Need for further Treatment/Surgery  ? Stiffness  ? Pain  ?  Residual or Recurrent Symptoms ? Anesthetic and/or Medical Risks                    4. I have also been informed by the informing physician that there are other risks from both known and unknown causes that are attendant to the performance of any surgical procedure. I am aware that the practice of medicine and surgery is not an exact science, and that no guarantees have been made to me concerning the results of the operation and/or procedure(s). 5. I   CONSENT / REFUSE CONSENT  (strike the phrase that does not apply) to the taking of photographs before, during and/or after the operation or procedure for scientific/educational purposes. 6. I consent to the administration of anesthesia and to the use of such anesthetics as may be deemed advisable by the anesthesiologist who has been engaged by me or my physician. 7. I certify that I have read and understand the above consent to operation and/or other procedure(s); that the explanations therein referred to were made to me by the informing physician in advance of my signing this consent; that all blanks or statements requiring insertion or completion were filled in and inapplicable paragraphs, if any, were stricken before I signed; and that all questions asked by me about the operation and/or procedure(s) which I have consented to have been fully answered in a satisfactory manner.                               _______________________           6/12/19                            Witness     Signature Of Patient         Lizbet Rawls                                                 Informing Physician                                           Signature of Informing Physician                              If patient is unable to sign or is a minor, complete one of the following:    (A)  Patient is a minor  years of age. (B)  Patient is unable to sign because:           The undersigned represents that he or she is duly authorized to execute this consent for and on behalf of the above named patient. Witness               o  Parent  o  Guardian   o  Spouse       o  Other (specify)                                   Patient Name: Meño Rosario  Patient YOB: 1945  Dr. Brenda Andrade' Return To Work Policy  Regarding your ability to return to work after surgery or injury, Dr. Brenda Andrade will not state that any patient is off of work or cannot work at all. He will place you on restrictions after your surgical procedure or injury. This means that you will be allowed to return to work the day after your office visit or surgery with restrictions. Depending on the details of your particular situation, Dr. Brenda Andrade may state that you will have either light use or no use of your hand for a specific number of weeks. It is your obligation to communicate with your employer regarding your restrictions. It is your employer's decision as to whether they will accommodate your restrictions (i.e. allow you to come to work in your restricted capacity) or to not allow you to return to work under your restrictions. Dr. Brenda Andrade does not participate in making this decision and cannot influence your employer regarding their decision. If you do not communicate your restrictions to your employer, or if you do not present to work as you are scheduled to, Dr. Brenda Andrade will not provide an 'excuse' to explain your absence. A doctors note, or official forms (BWC, FMLA, etc.) will be filled out, upon request, to indicate your date of surgery and your restrictions as stated above. Dr. Brenda Andrade' Narcotic Policy  Patients will only be prescribed narcotics after surgical procedures or significant injury. Not all procedures cause pain great enough to require Narcotics and thus, not all patients will receive prescriptions after surgical procedures or injuries.   Narcotics are never prescribed for

## 2019-06-12 NOTE — LETTER
[address] [place] [address] Ortho & Spine  Surgery Scheduling Form:      DEMOGRAPHICS:                                                                                                              .    Patient Name:  Tommy Navarro  Patient :  1945   Patient SS#:      Patient Phone:  [phone] (home)  Alt. Patient Phone:    Patient Address:  [address] [address]    PCP:  Kushal Hurtado MD  Insurance:  Payor: [employer] / Plan: [employer] PPO / Product Type: Medicare /   Insurance ID Number:    DIAGNOSIS & PROCEDURE:                                                                                            .  Diagnosis:   left Cubital Tunnel Syndrome / Ulnar Nerve Entrapment @ Elbow (354.2) &  left Carpal Tunnel Syndrome  Operation:  left  Ulnar Nerve Decompression  (at Elbow) &  left Carpal Tunnel Release  [CPT: 12572 + 69855]  Location:  ***  Surgeon:  Bertrand Meckel    SCHEDULING INFORMATION:                                                                                         .    Surgeon's Scheduling Instruction:  elective  Requested Date:    OR Time:   Patient Arrival Time:  OR Time Required:  30  Minutes  Anesthesia:  General  Equipment:  None  Mini C-Arm:  No   Standard C-Arm:  No  Status:  outpatient  PAT Required:  Yes  Comments:                      Juan Manuel Brunner.  Danae Nixon MD  19     BILLING INFORMATION:                                                                                                    .  Procedure:       CPT Code Modifier

## 2019-06-12 NOTE — PROGRESS NOTES
Mr. Francisco Randall is a 75 y.o. left handed retiree  who is seen today in Hand Surgical Consultation at the request of Diamante Nur MD.    He presents today regarding Left symptoms which have been present for approximately 6 months. A history of antecedent trauma or injury is Absent. He reports symptoms to include mild numbness & tingling in the Whole Hand. Neurologic symptoms do Frequently awaken him from sleep. He reports marked pain located in the left shoulder and to some degree left elbow, with retrograde and antegrade radiation . He reports mild pain located in the palmar left wrist.  Symptoms are worsening over time. Previous treatment has included no prior treatments used. He does not claim relation of his symptoms to his required work activities. He has undergone electrodiagnostic testing. The patient's , past medical history, medications, allergies,  family history, social history, and review of systems have been updated, reviewed and have been scanned into the chart today. Physical Exam:  Mr. Jeane Chirinos most recent vitals:  Vitals  BP: 138/77  Pulse: 59  Height: 5' 6\" (167.6 cm)  Weight: 173 lb (78.5 kg)    He is well nourished, oriented to person, place & time. He demonstrates appropriate mood and affect as well as normal gait and station. Skin: Normal in appearance, Normal Color and Free of Lesions Bilaterally   Digital range of motion is Full and equal bilaterally   Wrist range of motion is Full bilaterally  Elbow range of motion is equal bilateral   Sensation is subjectively tingling in the Whole Hand and objectively present in the same distribution on the Left, normal on the Right.   All other digits show normal sensation  Vascular examination reveals normal and good capillary refill bilaterally  Swelling is absent about the Medial elbow on the Left, normal on the Right & is mild about the Palmar wrist on the Left, normal on the Right  There is no evidence of postoperative concerns and the appropriate expectations after surgery. He was given the opportunity to ask questions, voiced an understanding of the procedure, and he did wish to proceed with Left carpal tunnel release(s). I had an extensive discussion with Mr. Valentin Santacruz and any family members present regarding the natural history, etiology, and long term consequences of this problem. I have outlined a treatment plan with them and, in my opinion, surgical intervention is indicated at this time. I have discussed with them the potential complications, limitations, expectations, alternatives, and risks of Carpal Tunnel Release(s). They have had full opportunity to ask their questions. I have answered them all to their satisfaction. I feel that the patient and any present family members do understand our discussion today and they have provided informed consent for Left carpal tunnel release(s). After discussion of the treatment options available for cubital tunnel syndrome and review of his past treatments, I have outlined for Mr. Valentin Santacruz the surgical treatment of Ulnar Nerve entrapment at the elbow and release of the Cubital Tunnel. I have discussed the details of the surgical procedure, the pre, corina and postoperative concerns and the appropriate expectations after surgery. He was given the opportunity to ask questions, voiced an understanding of the procedure, and he did wish to proceed with Left Ulnar Nerve Decompression at the Elbow. I had an extensive discussion with Mr. Valentin Santacruz and any family members present regarding the natural history, etiology, and long term consequences of this problem. I have outlined a treatment plan with them and, in my opinion, surgical intervention is indicated at this time. I have discussed with them the potential complications, limitations, expectations, alternatives, and risks of Cubital Tunnel Release(s).   They have had full opportunity to

## 2019-06-12 NOTE — PATIENT INSTRUCTIONS
Pre-Operative Instructions    1. The night before your surgery, unless otherwise instructed, do not eat any food, drink any liquids, chew gum or mints after midnight. Abstain from alcohol for 24 hours prior to surgery. 2. You will be contacted by the Hospital the working day prior to your procedure to confirm your arrival time. 3. Patients under 25years of age must have a parent or legal guardian present to sign their consent and discharge paperwork. 4. On the day of surgery,  you will be seen pre-operatively by an anesthesiologist.     5. If you are having hand surgery, it is recommended that nail polish and acrylic nails be removed prior to surgery if possible. 6. Please bring cases for glasses, contact lenses, hearing aids or dentures. They will likely be removed prior to surgery. 7. Wear casual, loose-fitting and comfortable clothing. Consider that you may have a large dressing to fit under your clothing after surgery. 9. Please do not bring valuables such as jewelry or large sums of cash to the hospital. Remove all body piercings before coming to the hospital. Bryce Shoe may not  wear any rings on the hand if you are having surgery on that hand, wrist or elbow. 10. Do not smoke or chew tobacco before your surgery. [address] [place] [address] and surgery facilities are smoke-free environments. Smoking is not permitted anywhere on campus. 11. Be sure to follow any additional instructions from your physician. If the above conditions are not met, your surgery may be cancelled and rescheduled for another day. Should you develop any change in your health such as fever, cough, sore throat, cold, flu, or infection, or if you have any questions regarding your Pre-admission or surgery, please contact [facility] - DEV - Surgery Scheduling at [phone], Monday through Friday, 9 a.m. to 5 p.m.

## 2019-07-08 ENCOUNTER — OFFICE VISIT (OUTPATIENT)
Dept: ORTHOPEDIC SURGERY | Age: 74
End: 2019-07-08
Payer: MEDICARE

## 2019-07-08 VITALS
BODY MASS INDEX: 27.97 KG/M2 | SYSTOLIC BLOOD PRESSURE: 131 MMHG | DIASTOLIC BLOOD PRESSURE: 81 MMHG | WEIGHT: 174 LBS | HEART RATE: 57 BPM | HEIGHT: 66 IN

## 2019-07-08 DIAGNOSIS — M77.8 LEFT SHOULDER TENDONITIS: ICD-10-CM

## 2019-07-08 DIAGNOSIS — M25.512 LEFT SHOULDER PAIN, UNSPECIFIED CHRONICITY: Primary | ICD-10-CM

## 2019-07-08 PROCEDURE — 20610 DRAIN/INJ JOINT/BURSA W/O US: CPT | Performed by: ORTHOPAEDIC SURGERY

## 2019-07-08 PROCEDURE — 99213 OFFICE O/P EST LOW 20 MIN: CPT | Performed by: ORTHOPAEDIC SURGERY

## 2019-07-08 RX ORDER — BETAMETHASONE SODIUM PHOSPHATE AND BETAMETHASONE ACETATE 3; 3 MG/ML; MG/ML
12 INJECTION, SUSPENSION INTRA-ARTICULAR; INTRALESIONAL; INTRAMUSCULAR; SOFT TISSUE ONCE
Status: COMPLETED | OUTPATIENT
Start: 2019-07-08 | End: 2019-07-08

## 2019-07-08 RX ADMIN — BETAMETHASONE SODIUM PHOSPHATE AND BETAMETHASONE ACETATE 12 MG: 3; 3 INJECTION, SUSPENSION INTRA-ARTICULAR; INTRALESIONAL; INTRAMUSCULAR; SOFT TISSUE at 11:29

## 2019-07-09 DIAGNOSIS — I67.9 CEREBROVASCULAR DISEASE: ICD-10-CM

## 2019-07-09 NOTE — PROGRESS NOTES
Date of current encounter: 7/8/2019  Chief Complaint    Left Shoulder Pain (new, [facility] referral for LT shoulder pain for 3 months, NKI but does fall alot )      History of Present Illness:  Christopher Sharp is a 75 y.o. male referred by Dr. Pollie Mohs for evaluation of  left shoulder pain. He is right-handed. He is retired from the workforce. He denies specific injury but he has had frequent falls due to age and instability with gait. He is also being treated for left carpal tunnel syndrome by Dr. Eran Hamilton. He uses a wrist brace which has reduced his hand symptoms. He reports pain in the anterior and lateral aspect of his left shoulder as described below. He finds that rest and use of an ice pack help relieve his shoulder symptoms temporarily. He denies night wakening due to shoulder pain. Pain Assessment  Location of Pain: Shoulder  Location Modifiers: Left  Severity of Pain: 8  Quality of Pain: Aching, Dull  Duration of Pain: A few days  Frequency of Pain: Intermittent  Aggravating Factors: Exercise  Limiting Behavior: Some  Relieving Factors: Rest  Result of Injury: No  Work-Related Injury: No  Are there other pain locations you wish to document?: No    Medical History:  Current Outpatient Medications   Medication Sig Dispense Refill    nitroGLYCERIN (NITROSTAT) 0.4 MG SL tablet Place 1 tablet under the tongue every 5 minutes as needed for Chest pain 25 tablet 3    simvastatin (ZOCOR) 20 MG tablet TAKE 1 TABLET NIGHTLY 90 tablet 3    pantoprazole (PROTONIX) 40 MG tablet Take 1 tablet by mouth daily 90 tablet 3    alendronate (FOSAMAX) 70 MG tablet Take 1 tablet by mouth every 7 days 12 tablet 3    amLODIPine (NORVASC) 10 MG tablet TAKE 1 TABLET DAILY 90 tablet 3    lisinopril (PRINIVIL;ZESTRIL) 40 MG tablet TAKE 1 TABLET DAILY 90 tablet 3    zoster recombinant adjuvanted vaccine (SHINGRIX) 50 MCG/0.5ML SUSR injection 0.5mL IM x 1.   Followed by 0.5mL IM 2-6 months after dose #1. 0.5 mL 0    docusate sodium (COLACE) 50 MG capsule Take 1 capsule by mouth daily 30 capsule 0    clopidogrel (PLAVIX) 75 MG tablet Take 1 tablet by mouth daily 30 tablet 0    clotrimazole-betamethasone (LOTRISONE) 1-0.05 % cream APPLY TOPICALLY TWICE DAILY 45 g 0     No current facility-administered medications for this visit. Past Medical History:   Diagnosis Date    Cerebrovascular disease 2007    Right leg weakness    Hyperlipidemia     Hypertension     Risk for falls 06/09/2017    YO [age]    Unspecified cerebral artery occlusion with cerebral infarction      Past Surgical History:   Procedure Laterality Date    HERNIA REPAIR      NOSE SURGERY      allergies, social and family histories were reviewed and updated as appropriate. Review of Systems:  Relevant review of systems reviewed and available in the patient's chart    Vital Signs:  /81   Pulse 57   Ht 5' 6\" (1.676 m)   Wt 174 lb (78.9 kg)   BMI 28.08 kg/m²     General Exam:   Constitutional: Patient is adequately groomed with no evidence of malnutrition  Mental Status: The patient is oriented to time, place and person. The patient's mood and affect are appropriate. Lymphatic: The lymphatic examination bilaterally reveals all areas to be without enlargement or induration. Neurological: The patient has good coordination and balance. There is no focal weakness or sensory deficit. Left Shoulder Examination:    Inspection:  No ptosis and normal deltoid contour. No abrasions or erythema. Palpation: He has some tenderness over the chromic clavicular joint as well as over the subdeltoid region and greater tuberosity. No tenderness to palpation to lateral epicondyle of his elbow. + medial elbow tenderness along the cubital tunnel and medial epicondyle    Range of Motion:     Forward Flexion: Pain limits forward flexion to about 120 degrees   Abduction: Pain limits abduction 75 degrees   Internal Rotation: Left hip pocket   External

## 2019-07-11 RX ORDER — CLOPIDOGREL BISULFATE 75 MG/1
75 TABLET ORAL DAILY
Qty: 90 TABLET | Refills: 1 | Status: SHIPPED | OUTPATIENT
Start: 2019-07-11 | End: 2020-11-25 | Stop reason: SDUPTHER

## 2019-07-24 ENCOUNTER — TELEPHONE (OUTPATIENT)
Dept: FAMILY MEDICINE CLINIC | Age: 74
End: 2019-07-24

## 2019-07-24 NOTE — TELEPHONE ENCOUNTER
Pts sister called and stated that pt can get wheelchair paid for through insurance. Sister advised to call insurance and see where the RX should be sent.  FYI will be calling back with that info

## 2019-07-25 ENCOUNTER — TELEPHONE (OUTPATIENT)
Dept: FAMILY MEDICINE CLINIC | Age: 74
End: 2019-07-25

## 2019-07-25 DIAGNOSIS — R53.81 PHYSICAL DECONDITIONING: Primary | ICD-10-CM

## 2019-07-25 NOTE — TELEPHONE ENCOUNTER
Patient had a stroke several years ago and his right side was effected. His legs have become very weak and he has had several falls. His sister would like to get a wheel chair for him so that they can take him out safely. [facility]   Phone [phone]  Fax [phone]    Please give Tyler Palomo a call when the script is sent to [facility].

## 2019-08-05 ENCOUNTER — TELEPHONE (OUTPATIENT)
Dept: FAMILY MEDICINE CLINIC | Age: 74
End: 2019-08-05

## 2019-08-21 ENCOUNTER — APPOINTMENT (OUTPATIENT)
Dept: GENERAL RADIOLOGY | Age: 74
End: 2019-08-21
Payer: MEDICARE

## 2019-08-21 ENCOUNTER — HOSPITAL ENCOUNTER (EMERGENCY)
Age: 74
Discharge: HOME | End: 2019-08-21
Payer: MEDICARE

## 2019-08-21 VITALS
WEIGHT: 174 LBS | BODY MASS INDEX: 28.08 KG/M2 | DIASTOLIC BLOOD PRESSURE: 82 MMHG | TEMPERATURE: 98.5 F | SYSTOLIC BLOOD PRESSURE: 135 MMHG | RESPIRATION RATE: 16 BRPM | OXYGEN SATURATION: 99 % | HEART RATE: 69 BPM
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
## 2019-08-21 DIAGNOSIS — S39.012A LUMBAR STRAIN, INITIAL ENCOUNTER: Primary | ICD-10-CM

## 2019-08-21 PROCEDURE — 72100 X-RAY EXAM L-S SPINE 2/3 VWS: CPT

## 2019-08-21 PROCEDURE — 99283 EMERGENCY DEPT VISIT LOW MDM: CPT

## 2019-08-21 ASSESSMENT — PAIN DESCRIPTION - LOCATION: LOCATION: SACRUM

## 2019-08-21 ASSESSMENT — PAIN SCALES - GENERAL: PAINLEVEL_OUTOF10: 8

## 2019-08-21 NOTE — ED PROVIDER NOTES
Resp: 16   Temp: 98.5 °F (36.9 °C)   SpO2: 99%   Weight: 174 lb (78.9 kg)       LABS:Labs Reviewed - No data to display     Remainder of labs reviewed and werenegative at this time or not returned at the time of this note. RADIOLOGY:   Non-plain film images such as CT, Ultrasound and MRI are read by the radiologist. Bre Gonzales PA-C have directly visualized the radiologic plain film image(s) with the below findings:    X-ray lumbar spine shows arthritic changes with old remote compression deformity L1 no change from previous study. Interpretation per the Radiologist below, if available at the time of thisnote:    XR LUMBAR SPINE (2-3 VIEWS)   Final Result   1. Stable known remote compression deformity of L1 and degenerative changes   primarily at T12-L1. 2. No acute vertebral body height loss within the lumbar spine compared with   10/07/2015. No results found. MEDICAL DECISION MAKING / ED COURSE:      PROCEDURES:   Procedures    None    Patient was given:  Medications - No data to display    X-ray lumbar spine shows no osseous abnormality. Patient has contusion injury secondary to stumble and fall as occasionally does happen. He did not strike his head. He has no headache or neck pain complaint. He is applied ice and has taken Tylenol before coming to the emergency room. Patient reassured. Patient gets somewhat compromised secondary to history of CVA with right residual.  I recommended to contact and follow with his healthcare provider on Friday as needed. He is to return to this facility if symptoms worsen. The patient and his neighbor both expressed understanding of the diagnosis and treatment plan. The patient tolerated their visit well. I evaluated the patient. The physician was available for consultation as needed.   The patient and / or the family were informed of the results of anytests, a time was given to answer questions, a plan was proposed and they agreed

## 2019-08-26 ENCOUNTER — TELEPHONE (OUTPATIENT)
Dept: FAMILY MEDICINE CLINIC | Age: 74
End: 2019-08-26

## 2019-08-26 NOTE — TELEPHONE ENCOUNTER
Received request for Standard Wheelchair with elevating leg rests. Need to have visit with patient for documentation on need for wheelchair. Please schedule appointment.

## 2019-08-30 ENCOUNTER — OFFICE VISIT (OUTPATIENT)
Dept: PRIMARY CARE CLINIC | Age: 74
End: 2019-08-30
Payer: MEDICARE

## 2019-08-30 VITALS
SYSTOLIC BLOOD PRESSURE: 128 MMHG | WEIGHT: 170.4 LBS | DIASTOLIC BLOOD PRESSURE: 70 MMHG | HEART RATE: 61 BPM | BODY MASS INDEX: 27.5 KG/M2 | OXYGEN SATURATION: 97 %

## 2019-08-30 DIAGNOSIS — I69.359 HEMIPARESIS DUE TO OLD LACUNAR STROKE (HCC): ICD-10-CM

## 2019-08-30 DIAGNOSIS — R29.6 MULTIPLE FALLS: ICD-10-CM

## 2019-08-30 DIAGNOSIS — G81.91 RIGHT HEMIPARESIS (HCC): ICD-10-CM

## 2019-08-30 DIAGNOSIS — H81.10 BENIGN PAROXYSMAL VERTIGO, UNSPECIFIED LATERALITY: ICD-10-CM

## 2019-08-30 DIAGNOSIS — R27.0 ATAXIA: Primary | ICD-10-CM

## 2019-08-30 PROCEDURE — 99213 OFFICE O/P EST LOW 20 MIN: CPT | Performed by: NURSE PRACTITIONER

## 2019-08-30 RX ORDER — AMLODIPINE BESYLATE 10 MG/1
TABLET ORAL
Qty: 90 TABLET | Refills: 0 | Status: SHIPPED | OUTPATIENT
Start: 2019-08-30 | End: 2020-11-25 | Stop reason: SDUPTHER

## 2019-08-30 ASSESSMENT — ENCOUNTER SYMPTOMS
COUGH: 0
NAUSEA: 0
DIARRHEA: 0
VOMITING: 0
SHORTNESS OF BREATH: 0

## 2019-08-30 NOTE — PROGRESS NOTES
laterality    4. Hemiparesis due to old lacunar stroke ([facility] [address]    5. Multiple falls     Current Outpatient Medications   Medication Sig Dispense Refill    clopidogrel (PLAVIX) 75 MG tablet Take 1 tablet by mouth daily 90 tablet 1    nitroGLYCERIN (NITROSTAT) 0.4 MG SL tablet Place 1 tablet under the tongue every 5 minutes as needed for Chest pain 25 tablet 3    simvastatin (ZOCOR) 20 MG tablet TAKE 1 TABLET NIGHTLY 90 tablet 3    pantoprazole (PROTONIX) 40 MG tablet Take 1 tablet by mouth daily 90 tablet 3    alendronate (FOSAMAX) 70 MG tablet Take 1 tablet by mouth every 7 days 12 tablet 3    amLODIPine (NORVASC) 10 MG tablet TAKE 1 TABLET DAILY 90 tablet 3    lisinopril (PRINIVIL;ZESTRIL) 40 MG tablet TAKE 1 TABLET DAILY 90 tablet 3    docusate sodium (COLACE) 50 MG capsule Take 1 capsule by mouth daily (Patient taking differently: Take 50 mg by mouth as needed ) 30 capsule 0    clotrimazole-betamethasone (LOTRISONE) 1-0.05 % cream APPLY TOPICALLY TWICE DAILY 45 g 0    zoster recombinant adjuvanted vaccine (SHINGRIX) 50 MCG/0.5ML SUSR injection 0.5mL IM x 1. Followed by 0.5mL IM 2-6 months after dose #1. 0.5 mL 0     No current facility-administered medications for this visit. There are no preventive care reminders to display for this patient. He verbalized understanding of instructions and counseling. Return if symptoms worsen or fail to improve. An  electronic signature was used to authenticate this note.     --OLGA Miller - CNP on 8/30/2019 at 11:18 AM

## 2019-09-24 ENCOUNTER — CARE COORDINATION (OUTPATIENT)
Dept: CARE COORDINATION | Age: 74
End: 2019-09-24

## 2019-12-02 ENCOUNTER — APPOINTMENT (OUTPATIENT)
Dept: ULTRASOUND IMAGING | Age: 74
End: 2019-12-02
Payer: MEDICARE

## 2019-12-02 ENCOUNTER — HOSPITAL ENCOUNTER (EMERGENCY)
Age: 74
Discharge: HOME | End: 2019-12-02
Attending: EMERGENCY MEDICINE
Payer: MEDICARE

## 2019-12-02 ENCOUNTER — APPOINTMENT (OUTPATIENT)
Dept: CT IMAGING | Age: 74
End: 2019-12-02
Payer: MEDICARE

## 2019-12-02 VITALS
SYSTOLIC BLOOD PRESSURE: 137 MMHG | RESPIRATION RATE: 18 BRPM | DIASTOLIC BLOOD PRESSURE: 77 MMHG | HEIGHT: 66 IN | BODY MASS INDEX: 27.64 KG/M2 | HEART RATE: 71 BPM | TEMPERATURE: 97 F | OXYGEN SATURATION: 95 % | WEIGHT: 172 LBS

## 2019-12-02 DIAGNOSIS — N44.2 TESTICULAR CYST: ICD-10-CM

## 2019-12-02 DIAGNOSIS — R10.9 FLANK PAIN: Primary | ICD-10-CM

## 2019-12-02 DIAGNOSIS — K44.9 HIATAL HERNIA: ICD-10-CM

## 2019-12-02 DIAGNOSIS — N40.0 PROSTATE ENLARGEMENT: ICD-10-CM

## 2019-12-02 DIAGNOSIS — N43.3 HYDROCELE, UNSPECIFIED HYDROCELE TYPE: ICD-10-CM

## 2019-12-02 LAB
A/G RATIO: 1.8 (ref 1.1–2.2)
ALBUMIN SERPL-MCNC: 4.8 G/DL (ref 3.4–5)
ALP BLD-CCNC: 61 U/L (ref 40–129)
ALT SERPL-CCNC: 18 U/L (ref 10–40)
ANION GAP SERPL CALCULATED.3IONS-SCNC: 15 MMOL/L (ref 3–16)
AST SERPL-CCNC: 25 U/L (ref 15–37)
BASOPHILS ABSOLUTE: 0 K/UL (ref 0–0.2)
BASOPHILS RELATIVE PERCENT: 0.7 %
BILIRUB SERPL-MCNC: 0.6 MG/DL (ref 0–1)
BILIRUBIN URINE: NEGATIVE
BLOOD, URINE: NEGATIVE
BUN BLDV-MCNC: 14 MG/DL (ref 7–20)
CALCIUM SERPL-MCNC: 9.7 MG/DL (ref 8.3–10.6)
CHLORIDE BLD-SCNC: 102 MMOL/L (ref 99–110)
CLARITY: CLEAR
CO2: 20 MMOL/L (ref 21–32)
COLOR: YELLOW
CREAT SERPL-MCNC: 1 MG/DL (ref 0.8–1.3)
EOSINOPHILS ABSOLUTE: 0.1 K/UL (ref 0–0.6)
EOSINOPHILS RELATIVE PERCENT: 1.6 %
GFR AFRICAN AMERICAN: >60
GFR NON-AFRICAN AMERICAN: >60
GLOBULIN: 2.7 G/DL
GLUCOSE BLD-MCNC: 126 MG/DL (ref 70–99)
GLUCOSE URINE: NEGATIVE MG/DL
HCT VFR BLD CALC: 51.4 % (ref 40.5–52.5)
HEMOGLOBIN: 17.4 G/DL (ref 13.5–17.5)
KETONES, URINE: NEGATIVE MG/DL
LACTIC ACID, SEPSIS: 1.3 MMOL/L (ref 0.4–1.9)
LEUKOCYTE ESTERASE, URINE: NEGATIVE
LIPASE: 59 U/L (ref 13–60)
LYMPHOCYTES ABSOLUTE: 0.8 K/UL (ref 1–5.1)
LYMPHOCYTES RELATIVE PERCENT: 16.9 %
MCH RBC QN AUTO: 31.7 PG (ref 26–34)
MCHC RBC AUTO-ENTMCNC: 33.9 G/DL (ref 31–36)
MCV RBC AUTO: 93.5 FL (ref 80–100)
MICROSCOPIC EXAMINATION: NORMAL
MONOCYTES ABSOLUTE: 0.7 K/UL (ref 0–1.3)
MONOCYTES RELATIVE PERCENT: 13.8 %
NEUTROPHILS ABSOLUTE: 3.2 K/UL (ref 1.7–7.7)
NEUTROPHILS RELATIVE PERCENT: 67 %
NITRITE, URINE: NEGATIVE
PDW BLD-RTO: 13.6 % (ref 12.4–15.4)
PH UA: 6.5 (ref 5–8)
PLATELET # BLD: 111 K/UL (ref 135–450)
PMV BLD AUTO: 8.5 FL (ref 5–10.5)
POTASSIUM SERPL-SCNC: 4.1 MMOL/L (ref 3.5–5.1)
PROTEIN UA: NEGATIVE MG/DL
RBC # BLD: 5.5 M/UL (ref 4.2–5.9)
REASON FOR REJECTION: NORMAL
REJECTED TEST: NORMAL
SODIUM BLD-SCNC: 137 MMOL/L (ref 136–145)
SPECIFIC GRAVITY UA: 1.02 (ref 1–1.03)
TOTAL PROTEIN: 7.5 G/DL (ref 6.4–8.2)
URINE REFLEX TO CULTURE: NORMAL
URINE TYPE: NORMAL
UROBILINOGEN, URINE: 0.2 E.U./DL
WBC # BLD: 4.8 K/UL (ref 4–11)

## 2019-12-02 PROCEDURE — 6360000004 HC RX CONTRAST MEDICATION: Performed by: NURSE PRACTITIONER

## 2019-12-02 PROCEDURE — 74177 CT ABD & PELVIS W/CONTRAST: CPT

## 2019-12-02 PROCEDURE — 96374 THER/PROPH/DIAG INJ IV PUSH: CPT

## 2019-12-02 PROCEDURE — 6360000002 HC RX W HCPCS: Performed by: NURSE PRACTITIONER

## 2019-12-02 PROCEDURE — 81003 URINALYSIS AUTO W/O SCOPE: CPT

## 2019-12-02 PROCEDURE — 83690 ASSAY OF LIPASE: CPT

## 2019-12-02 PROCEDURE — 76870 US EXAM SCROTUM: CPT

## 2019-12-02 PROCEDURE — 93975 VASCULAR STUDY: CPT

## 2019-12-02 PROCEDURE — 80053 COMPREHEN METABOLIC PANEL: CPT

## 2019-12-02 PROCEDURE — 83605 ASSAY OF LACTIC ACID: CPT

## 2019-12-02 PROCEDURE — 99284 EMERGENCY DEPT VISIT MOD MDM: CPT

## 2019-12-02 PROCEDURE — 85025 COMPLETE CBC W/AUTO DIFF WBC: CPT

## 2019-12-02 RX ORDER — KETOROLAC TROMETHAMINE 30 MG/ML
15 INJECTION, SOLUTION INTRAMUSCULAR; INTRAVENOUS ONCE
Status: COMPLETED | OUTPATIENT
Start: 2019-12-02 | End: 2019-12-02

## 2019-12-02 RX ORDER — CYCLOBENZAPRINE HCL 10 MG
10 TABLET ORAL 3 TIMES DAILY PRN
Qty: 15 TABLET | Refills: 0 | Status: SHIPPED | OUTPATIENT
Start: 2019-12-02 | End: 2020-06-26

## 2019-12-02 RX ADMIN — KETOROLAC TROMETHAMINE 15 MG: 30 INJECTION, SOLUTION INTRAMUSCULAR at 10:54

## 2019-12-02 RX ADMIN — IOPAMIDOL 75 ML: 755 INJECTION, SOLUTION INTRAVENOUS at 10:00

## 2019-12-02 ASSESSMENT — ENCOUNTER SYMPTOMS
SHORTNESS OF BREATH: 0
VOMITING: 0
DIARRHEA: 0
NAUSEA: 1
CHEST TIGHTNESS: 0
ABDOMINAL PAIN: 1

## 2019-12-02 ASSESSMENT — PAIN DESCRIPTION - ORIENTATION: ORIENTATION: RIGHT;LEFT

## 2019-12-02 ASSESSMENT — PAIN DESCRIPTION - DESCRIPTORS: DESCRIPTORS: ACHING

## 2019-12-02 ASSESSMENT — PAIN DESCRIPTION - PAIN TYPE: TYPE: ACUTE PAIN

## 2019-12-02 ASSESSMENT — PAIN SCALES - GENERAL
PAINLEVEL_OUTOF10: 10
PAINLEVEL_OUTOF10: 8

## 2019-12-02 ASSESSMENT — PAIN DESCRIPTION - FREQUENCY: FREQUENCY: CONTINUOUS

## 2019-12-02 ASSESSMENT — PAIN DESCRIPTION - LOCATION: LOCATION: ABDOMEN;SCROTUM

## 2020-06-22 ENCOUNTER — TELEPHONE (OUTPATIENT)
Dept: INTERNAL MEDICINE CLINIC | Age: 75
End: 2020-06-22

## 2020-06-22 NOTE — TELEPHONE ENCOUNTER
[facility] received a call from:    Name of Caller: Sonia Thomson    Relationship to patient: Self    Best contact number: [phone]    Reason for call:     Patient had an appointment for new to provider set up in march and had to cancel due to the epidemic. Patient is now ready to reschedule with HIEN Morel. Patient is requesting an appointment on Thursday or Friday early morning or early afternoon. Please advise if this is possible.

## 2020-06-26 ENCOUNTER — OFFICE VISIT (OUTPATIENT)
Dept: INTERNAL MEDICINE CLINIC | Age: 75
End: 2020-06-26
Payer: MEDICARE

## 2020-06-26 VITALS
SYSTOLIC BLOOD PRESSURE: 132 MMHG | OXYGEN SATURATION: 98 % | WEIGHT: 188 LBS | BODY MASS INDEX: 30.34 KG/M2 | HEART RATE: 68 BPM | DIASTOLIC BLOOD PRESSURE: 84 MMHG | TEMPERATURE: 98.3 F

## 2020-06-26 DIAGNOSIS — E78.5 HYPERLIPIDEMIA, UNSPECIFIED HYPERLIPIDEMIA TYPE: ICD-10-CM

## 2020-06-26 DIAGNOSIS — I10 ESSENTIAL HYPERTENSION: ICD-10-CM

## 2020-06-26 DIAGNOSIS — R73.9 ELEVATED BLOOD SUGAR: ICD-10-CM

## 2020-06-26 DIAGNOSIS — Z76.89 ENCOUNTER TO ESTABLISH CARE: ICD-10-CM

## 2020-06-26 LAB
A/G RATIO: 1.8 (ref 1.1–2.2)
ALBUMIN SERPL-MCNC: 4.5 G/DL (ref 3.4–5)
ALP BLD-CCNC: 61 U/L (ref 40–129)
ALT SERPL-CCNC: 34 U/L (ref 10–40)
ANION GAP SERPL CALCULATED.3IONS-SCNC: 16 MMOL/L (ref 3–16)
AST SERPL-CCNC: 24 U/L (ref 15–37)
BILIRUB SERPL-MCNC: 0.5 MG/DL (ref 0–1)
BUN BLDV-MCNC: 15 MG/DL (ref 7–20)
CALCIUM SERPL-MCNC: 9.4 MG/DL (ref 8.3–10.6)
CHLORIDE BLD-SCNC: 100 MMOL/L (ref 99–110)
CHOLESTEROL, TOTAL: 131 MG/DL (ref 0–199)
CO2: 23 MMOL/L (ref 21–32)
CREAT SERPL-MCNC: 1.1 MG/DL (ref 0.8–1.3)
GFR AFRICAN AMERICAN: >60
GFR NON-AFRICAN AMERICAN: >60
GLOBULIN: 2.5 G/DL
GLUCOSE BLD-MCNC: 108 MG/DL (ref 70–99)
HDLC SERPL-MCNC: 45 MG/DL (ref 40–60)
LDL CHOLESTEROL CALCULATED: 64 MG/DL
POTASSIUM SERPL-SCNC: 4.4 MMOL/L (ref 3.5–5.1)
SODIUM BLD-SCNC: 139 MMOL/L (ref 136–145)
TOTAL PROTEIN: 7 G/DL (ref 6.4–8.2)
TRIGL SERPL-MCNC: 111 MG/DL (ref 0–150)
VLDLC SERPL CALC-MCNC: 22 MG/DL

## 2020-06-26 PROCEDURE — 99214 OFFICE O/P EST MOD 30 MIN: CPT | Performed by: NURSE PRACTITIONER

## 2020-06-26 PROCEDURE — 82274 ASSAY TEST FOR BLOOD FECAL: CPT | Performed by: NURSE PRACTITIONER

## 2020-06-26 ASSESSMENT — ENCOUNTER SYMPTOMS
CHEST TIGHTNESS: 0
CONSTIPATION: 0
DIARRHEA: 0
RECTAL PAIN: 0
ABDOMINAL PAIN: 0
COUGH: 0
WHEEZING: 0
SHORTNESS OF BREATH: 0
BLOOD IN STOOL: 0

## 2020-06-26 ASSESSMENT — PATIENT HEALTH QUESTIONNAIRE - PHQ9
SUM OF ALL RESPONSES TO PHQ QUESTIONS 1-9: 0
2. FEELING DOWN, DEPRESSED OR HOPELESS: 0
SUM OF ALL RESPONSES TO PHQ9 QUESTIONS 1 & 2: 0
1. LITTLE INTEREST OR PLEASURE IN DOING THINGS: 0
SUM OF ALL RESPONSES TO PHQ QUESTIONS 1-9: 0

## 2020-06-26 NOTE — PROGRESS NOTES
g/dL Final    RDW 12/02/2019 13.6  12.4 - 15.4 % Final    Platelets 24/07/1647 111* 135 - 450 K/uL Final    MPV 12/02/2019 8.5  5.0 - 10.5 fL Final    Neutrophils % 12/02/2019 67.0  % Final    Lymphocytes % 12/02/2019 16.9  % Final    Monocytes % 12/02/2019 13.8  % Final    Eosinophils % 12/02/2019 1.6  % Final    Basophils % 12/02/2019 0.7  % Final    Neutrophils Absolute 12/02/2019 3.2  1.7 - 7.7 K/uL Final    Lymphocytes Absolute 12/02/2019 0.8* 1.0 - 5.1 K/uL Final    Monocytes Absolute 12/02/2019 0.7  0.0 - 1.3 K/uL Final    Eosinophils Absolute 12/02/2019 0.1  0.0 - 0.6 K/uL Final    Basophils Absolute 12/02/2019 0.0  0.0 - 0.2 K/uL Final    Sodium 12/02/2019 137  136 - 145 mmol/L Final    Potassium 12/02/2019 4.1  3.5 - 5.1 mmol/L Final    Comment: Specimen hemolysis has exceeded the interference as defined by Roche. Value may be falsely increased. Suggest recollection if clinically  indicated.  Chloride 12/02/2019 102  99 - 110 mmol/L Final    CO2 12/02/2019 20* 21 - 32 mmol/L Final    Anion Gap 12/02/2019 15  3 - 16 Final    Glucose 12/02/2019 126* 70 - 99 mg/dL Final    BUN 12/02/2019 14  7 - 20 mg/dL Final    CREATININE 12/02/2019 1.0  0.8 - 1.3 mg/dL Final    GFR Non- 12/02/2019 >60  >60 Final    Comment: >60 mL/min/1.73m2 EGFR, calc. for ages 25 and older using the  MDRD formula (not corrected for weight), is valid for stable  renal function.  GFR  12/02/2019 >60  >60 Final    Comment: Chronic Kidney Disease: less than 60 ml/min/1.73 sq.m. Kidney Failure: less than 15 ml/min/1.73 sq.m. Results valid for patients 18 years and older.       Calcium 12/02/2019 9.7  8.3 - 10.6 mg/dL Final    Total Protein 12/02/2019 7.5  6.4 - 8.2 g/dL Final    Alb 12/02/2019 4.8  3.4 - 5.0 g/dL Final    Albumin/Globulin Ratio 12/02/2019 1.8  1.1 - 2.2 Final    Total Bilirubin 12/02/2019 0.6  0.0 - 1.0 mg/dL Final    Alkaline Phosphatase swelling. Gastrointestinal: Negative for abdominal pain, blood in stool, constipation, diarrhea and rectal pain. Neurological: Negative for dizziness, tremors, light-headedness and headaches. /84   Pulse 68   Temp 98.3 °F (36.8 °C)   Wt 188 lb (85.3 kg)   SpO2 98%   BMI 30.34 kg/m²      Physical Exam  Vitals signs reviewed. Constitutional:       General: He is not in acute distress. Appearance: He is well-developed. He is not ill-appearing or diaphoretic. HENT:      Head: Normocephalic and atraumatic. Cardiovascular:      Rate and Rhythm: Normal rate and regular rhythm. Heart sounds: Normal heart sounds. No murmur. Pulmonary:      Effort: Pulmonary effort is normal. No respiratory distress. Breath sounds: Normal breath sounds. No wheezing or rhonchi. Musculoskeletal:      Right lower leg: Edema present. Left lower leg: Edema present. Skin:     General: Skin is warm and dry. Neurological:      General: No focal deficit present. Mental Status: He is alert. Mental status is at baseline. Motor: Weakness present. Gait: Gait abnormal.      Comments: In w/c  Right sided weakness   Psychiatric:         Mood and Affect: Mood and affect normal.         Behavior: Behavior normal.       Diagnosis  Assessment and Plan  1. Encounter to establish care  Overall the patient is feeling well and doing well  Due for labs  Pt reports he is doing well and denies any medication refills today. - Comprehensive Metabolic Panel; Future  - Lipid Panel; Future    2. Ataxia  Stable, controlled on current regimen. In wheelchair    3. CAD in native artery  Stable, controlled on current regimen. Doing well on statin and Plavix    4. Hyperlipidemia, unspecified hyperlipidemia type  Stable, controlled on current regimen.     - Lipid Panel; Future    5. Essential hypertension  Stable, controlled on current regimen. - Comprehensive Metabolic Panel; Future    6.  Hemiparesis due to old lacunar stroke (HCC)  Stable, controlled on current regimen. 7. Cerebrovascular disease  History of CVA in 2007  Patient is on statin and Plavix  Checking blood work    8. Bilateral carotid artery stenosis  Stable, has not had an ultrasound in 3 years  Has seen Dr. Tyler Bishop in the past  - VL DUP CAROTID BILATERAL; Future    10. Elevated blood sugar  - Hemoglobin A1C; Future    11. Colon cancer screening  - POCT Fecal Immunochemical Test (FIT);  Future    Follow up in 6 months and prn     Electronically signed by OLGA Roe CNP on 6/26/2020 at 10:43 AM

## 2020-06-27 LAB
ESTIMATED AVERAGE GLUCOSE: 116.9 MG/DL
HBA1C MFR BLD: 5.7 %

## 2020-07-02 ENCOUNTER — TELEPHONE (OUTPATIENT)
Dept: ADMINISTRATIVE | Age: 75
End: 2020-07-02

## 2020-07-02 NOTE — TELEPHONE ENCOUNTER
Called to speak to to [facility], says she will call to see if she can get transportation.

## 2020-07-07 LAB
CONTROL: NORMAL
HEMOCCULT STL QL: NEGATIVE

## 2020-07-15 ENCOUNTER — OFFICE VISIT (OUTPATIENT)
Dept: INTERNAL MEDICINE CLINIC | Age: 75
End: 2020-07-15
Payer: MEDICARE

## 2020-07-15 VITALS
DIASTOLIC BLOOD PRESSURE: 82 MMHG | TEMPERATURE: 98.8 F | BODY MASS INDEX: 30.38 KG/M2 | OXYGEN SATURATION: 98 % | HEART RATE: 68 BPM | SYSTOLIC BLOOD PRESSURE: 124 MMHG | WEIGHT: 188.2 LBS

## 2020-07-15 PROCEDURE — 99214 OFFICE O/P EST MOD 30 MIN: CPT | Performed by: NURSE PRACTITIONER

## 2020-07-15 RX ORDER — GABAPENTIN 100 MG/1
100 CAPSULE ORAL 2 TIMES DAILY
Qty: 180 CAPSULE | Refills: 0 | Status: SHIPPED | OUTPATIENT
Start: 2020-07-15 | End: 2020-10-12

## 2020-07-15 ASSESSMENT — ENCOUNTER SYMPTOMS
CHEST TIGHTNESS: 0
SHORTNESS OF BREATH: 0
COUGH: 0
WHEEZING: 0

## 2020-07-15 NOTE — PROGRESS NOTES
7/15/20     Chief Complaint   Patient presents with    Peripheral Neuropathy     had burning in left foot, foot doctor said it was neuropathy, no longer burning but continues to itch     HPI     Patient is here for follow-up of bilateral peripheral neuropathy. He was recently seen by his podiatrist who advised him to schedule an appointment here. 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 No Known Allergies    Current Outpatient Medications   Medication Sig Dispense Refill    gabapentin (NEURONTIN) 100 MG capsule Take 1 capsule by mouth 2 times daily for 90 days. Intended supply: 30 days 180 capsule 0    amLODIPine (NORVASC) 10 MG tablet TAKE 1 TABLET DAILY 90 tablet 0    clopidogrel (PLAVIX) 75 MG tablet Take 1 tablet by mouth daily 90 tablet 1    simvastatin (ZOCOR) 20 MG tablet TAKE 1 TABLET NIGHTLY 90 tablet 3    pantoprazole (PROTONIX) 40 MG tablet Take 1 tablet by mouth daily 90 tablet 3    alendronate (FOSAMAX) 70 MG tablet Take 1 tablet by mouth every 7 days 12 tablet 3    lisinopril (PRINIVIL;ZESTRIL) 40 MG tablet TAKE 1 TABLET DAILY 90 tablet 3    docusate sodium (COLACE) 50 MG capsule Take 1 capsule by mouth daily (Patient taking differently: Take 50 mg by mouth as needed ) 30 capsule 0    clotrimazole-betamethasone (LOTRISONE) 1-0.05 % cream APPLY TOPICALLY TWICE DAILY 45 g 0     No current facility-administered medications for this visit. Review of Systems   Constitutional: Negative for chills, fatigue and fever. Respiratory: Negative for cough, chest tightness, shortness of breath and wheezing. Cardiovascular: Negative for chest pain, palpitations and leg swelling. Neurological: Positive for weakness and numbness.  Negative for dizziness, tremors, light-headedness and headaches. Vitals:    07/15/20 1433   BP: 124/82   Pulse: 68   Temp: 98.8 °F (37.1 °C)   SpO2: 98%   Weight: 188 lb 3.2 oz (85.4 kg)      Physical Exam  Constitutional:       General: He is not in acute distress. Appearance: Normal appearance. He is not ill-appearing. HENT:      Head: Normocephalic and atraumatic. Cardiovascular:      Pulses:           Dorsalis pedis pulses are 2+ on the right side and 2+ on the left side. Pulmonary:      Effort: Pulmonary effort is normal. No respiratory distress. Musculoskeletal:      Right foot: No deformity or foot drop. Left foot: No deformity or foot drop. Feet:      Right foot:      Protective Sensation: 8 sites tested. 2 sites sensed. Skin integrity: Skin integrity normal.      Left foot:      Protective Sensation: 8 sites tested. 2 sites sensed. Skin integrity: Skin integrity normal.   Neurological:      General: No focal deficit present. Mental Status: He is alert and oriented to person, place, and time. Mental status is at baseline. Psychiatric:         Mood and Affect: Mood normal.         Behavior: Behavior normal.       Assessment/Plan     1. Numbness and tingling of both feet/peripheral neuropathy  Stable, uncontrolled  Progressing  We discussed options  Trial of gabapentin  Holding off on EMG for now  - gabapentin (NEURONTIN) 100 MG capsule; Take 1 capsule by mouth 2 times daily for 90 days. Intended supply: 30 days  Dispense: 180 capsule; Refill: 0    2. Right hemiparesis (HCC)  Stable, controlled on current regimen. Discussed medications with patient, who voiced understanding of their use and indications. All questions answered.     Follow-up as scheduled for chronic conditions     Electronically signed by OLGA Orellana CNP on 7/15/2020 at 3:07 PM

## 2020-09-15 ENCOUNTER — TELEPHONE (OUTPATIENT)
Dept: INTERNAL MEDICINE CLINIC | Age: 75
End: 2020-09-15

## 2020-09-16 NOTE — TELEPHONE ENCOUNTER
Okay to cleanse with soap and water. Cover with a Band-Aid. May use small amount of antibiotic ointment. If worsening or having swelling, drainage, increased redness or warmth patient should have an appointment.

## 2020-10-12 RX ORDER — GABAPENTIN 100 MG/1
CAPSULE ORAL
Qty: 180 CAPSULE | Refills: 0 | Status: SHIPPED | OUTPATIENT
Start: 2020-10-12 | End: 2021-04-06

## 2020-11-25 RX ORDER — LISINOPRIL 40 MG/1
TABLET ORAL
Qty: 90 TABLET | Refills: 1 | Status: SHIPPED | OUTPATIENT
Start: 2020-11-25 | End: 2021-09-01

## 2020-11-25 RX ORDER — SIMVASTATIN 20 MG
TABLET ORAL
Qty: 90 TABLET | Refills: 1 | Status: SHIPPED | OUTPATIENT
Start: 2020-11-25 | End: 2021-09-30 | Stop reason: SDUPTHER

## 2020-11-25 RX ORDER — AMLODIPINE BESYLATE 10 MG/1
TABLET ORAL
Qty: 90 TABLET | Refills: 1 | Status: SHIPPED | OUTPATIENT
Start: 2020-11-25

## 2020-11-25 RX ORDER — CLOPIDOGREL BISULFATE 75 MG/1
75 TABLET ORAL DAILY
Qty: 90 TABLET | Refills: 1 | Status: SHIPPED | OUTPATIENT
Start: 2020-11-25 | End: 2021-09-27

## 2020-11-25 RX ORDER — PANTOPRAZOLE SODIUM 40 MG/1
40 TABLET, DELAYED RELEASE ORAL DAILY
Qty: 90 TABLET | Refills: 1 | Status: SHIPPED | OUTPATIENT
Start: 2020-11-25 | End: 2021-09-30 | Stop reason: SDUPTHER

## 2020-11-25 NOTE — TELEPHONE ENCOUNTER
Pt called asking for refills on:  Pantoprazole 40 mg  QD  Plavix 75mg  Qd  Amlodipine 10 mg  Qd  Lisinopril 40 mg   Qd  Simvastatin 20 mg  Qd    [facility].  In Epic

## 2020-12-17 ENCOUNTER — OFFICE VISIT (OUTPATIENT)
Dept: INTERNAL MEDICINE CLINIC | Age: 75
End: 2020-12-17
Payer: MEDICARE

## 2020-12-17 VITALS
DIASTOLIC BLOOD PRESSURE: 72 MMHG | HEART RATE: 72 BPM | BODY MASS INDEX: 30.28 KG/M2 | TEMPERATURE: 97.5 F | WEIGHT: 188.4 LBS | HEIGHT: 66 IN | SYSTOLIC BLOOD PRESSURE: 122 MMHG

## 2020-12-17 PROCEDURE — 99213 OFFICE O/P EST LOW 20 MIN: CPT | Performed by: NURSE PRACTITIONER

## 2020-12-17 RX ORDER — DOXYCYCLINE HYCLATE 100 MG
100 TABLET ORAL 2 TIMES DAILY
Qty: 20 TABLET | Refills: 0 | Status: SHIPPED | OUTPATIENT
Start: 2020-12-17 | End: 2020-12-27

## 2020-12-17 ASSESSMENT — ENCOUNTER SYMPTOMS
NAUSEA: 0
COUGH: 0
SHORTNESS OF BREATH: 0
VOMITING: 0
WHEEZING: 0
DIARRHEA: 0
CONSTIPATION: 0
ABDOMINAL PAIN: 0

## 2020-12-17 NOTE — PATIENT INSTRUCTIONS
Take ATLL antibiotics as prescribed- do not stop early  Elevate legs when seated. Decrease salt intake  Patient Education        doxycycline (oral/injection)  Pronunciation:  TRACI karlie buckner  Brand:  Acticlate, Adoxa, Alodox, Avidoxy, Doryx, Mondoxyne NL, Monodox, Morgidox, Oracea, Oraxyl, Targadox, Vibramycin  What is the most important information I should know about doxycycline? You should not take this medicine if you are allergic to any tetracycline antibiotic. Children younger than 6years old should use doxycycline only in cases of severe or life-threatening conditions. This medicine can cause permanent yellowing or graying of the teeth in children  Using doxycycline during pregnancy could harm the unborn baby or cause permanent tooth discoloration later in the baby's life. What is doxycycline? Doxycycline is a tetracycline antibiotic that fights bacteria in the body. Doxycycline is used to treat many different bacterial infections, such as acne, urinary tract infections, intestinal infections, eye infections, gonorrhea, chlamydia, periodontitis (gum disease), and others. Doxycycline is also used to treat blemishes, bumps, and acne-like lesions caused by rosacea. Doxycycline will not treat facial redness caused by rosacea. Some forms of doxycycline are used to prevent malaria, to treat anthrax, or to treat infections caused by mites, ticks, or lice. Doxycycline may also be used for purposes not listed in this medication guide. What should I discuss with my healthcare provider before taking doxycycline? You should not take this medicine if you are allergic to doxycycline or other tetracycline antibiotics such as demeclocycline, minocycline, tetracycline, or tigecycline.   Tell your doctor if you have ever had:  · liver disease;  · kidney disease;  · asthma or sulfite allergy;  · increased pressure inside your skull; or · if you also take isotretinoin, seizure medicine, or a blood thinner such as warfarin (Coumadin). If you are using doxycycline to treat gonorrhea, your doctor may test you to make sure you do not also have syphilis, another sexually transmitted disease. Taking this medicine during pregnancy may affect tooth and bone development in the unborn baby. Taking doxycycline during the last half of pregnancy can cause permanent tooth discoloration later in the baby's life. Tell your doctor if you are pregnant or if you become pregnant. Doxycycline can make birth control pills less effective. Ask your doctor about using a non-hormonal birth control (condom, diaphragm with spermicide) to prevent pregnancy. Doxycycline can pass into breast milk and may affect bone and tooth development in a nursing infant. Do not breastfeed while you are taking doxycycline. Doxycycline can cause permanent yellowing or graying of the teeth in children younger than 6years old. Children should use doxycycline only in cases of severe or life-threatening conditions such as anthrax or [facility] spotted fever. The benefit of treating a serious condition may outweigh any risks to the child's tooth development. How should I take doxycycline? Follow all directions on your prescription label and read all medication guides or instruction sheets. Use the medicine exactly as directed. Take doxycycline with a full glass of water. Drink plenty of liquids while you are taking doxycycline. Read and carefully follow any Instructions for Use provided with your medicine. Ask your doctor or pharmacist if you do not understand these instructions. Most brands of doxycyline may be taken with food or milk if the medicine upsets your stomach. Different brands of doxycycline may have different instructions about taking them with or without food.    Take Oracea on an empty stomach, at least 1 hour before or 2 hours after a meal. You may need to split a doxycycline tablet to get the correct dose. Follow your doctor's instructions. Swallow a delayed-release capsule or tablet whole. Do not crush, chew, break, or open it. Measure liquid medicine  with the dosing syringe provided, or with a special dose-measuring spoon or medicine cup. If you do not have a dose-measuring device, ask your pharmacist for one. If you take doxycycline to prevent malaria: Start taking the medicine 1 or 2 days before entering an area where malaria is common. Continue taking the medicine every day during your stay and for at least 4 weeks after you leave the area. Doxycycline is usually given by injection only if you are unable to take the medicine by mouth. A healthcare provider will give you this injection as an infusion into a vein. Use this medicine for the full prescribed length of time, even if your symptoms quickly improve. Skipping doses can increase your risk of infection that is resistant to medication. Doxycycline will not treat a viral infection such as the flu or a common cold. Store at room temperature away from moisture, heat, and light. Throw away any unused medicine after the expiration date on the label has passed. Using  doxycycline can cause damage to your kidneys. What happens if I miss a dose? Take the medicine as soon as you can, but skip the missed dose if it is almost time for your next dose. Do not take two doses at one time. What happens if I overdose? Seek emergency medical attention or call the Poison Help line at 1-403.425.4261. What should I avoid while taking doxycycline? Do not take iron supplements, multivitamins, calcium supplements, antacids, or laxatives within 2 hours before or after taking doxycycline. Avoid taking any other antibiotics with doxycycline unless your doctor has told you to. Doxycycline could make you sunburn more easily. Avoid sunlight or tanning beds. Wear protective clothing and use sunscreen (SPF 30 or higher) when you are outdoors. Antibiotic medicines can cause diarrhea, which may be a sign of a new infection. If you have diarrhea that is watery or bloody, call your doctor. Do not use anti-diarrhea medicine unless your doctor tells you to. What are the possible side effects of doxycycline? Get emergency medical help if you have signs of an allergic reaction (hives, difficult breathing, swelling in your face or throat) or a severe skin reaction (fever, sore throat, burning in your eyes, skin pain, red or purple skin rash that spreads and causes blistering and peeling). Seek medical treatment if you have a serious drug reaction that can affect many parts of your body. Symptoms may include: skin rash, fever, swollen glands, flu-like symptoms, muscle aches, severe weakness, unusual bruising, or yellowing of your skin or eyes. This reaction may occur several weeks after you began using doxycycline. Call your doctor at once if you have:  · severe stomach pain, diarrhea that is watery or bloody;  · throat irritation, trouble swallowing;  · chest pain, irregular heart rhythm, feeling short of breath;  · little or no urination;  · low white blood cell counts --fever, chills, swollen glands, body aches, weakness, pale skin, easy bruising or bleeding;  · increased pressure inside the skull --severe headaches, ringing in your ears, dizziness, nausea, vision problems, pain behind your eyes; or  · signs of liver or pancreas problems --loss of appetite, upper stomach pain (that may spread to your back), tiredness, nausea or vomiting, fast heart rate, dark urine, jaundice (yellowing of the skin or eyes).   Common side effects may include:  · nausea, vomiting, upset stomach, loss of appetite;  · mild diarrhea;  · skin rash or itching;  · darkened skin color; or · vaginal itching or discharge. This is not a complete list of side effects and others may occur. Call your doctor for medical advice about side effects. You may report side effects to FDA at 7-211-OSP-4015. What other drugs will affect doxycycline? Sometimes it is not safe to use certain medications at the same time. Some drugs can affect your blood levels of other drugs you take, which may increase side effects or make the medications less effective. Other drugs may affect doxycycline, including prescription and over-the-counter medicines, vitamins, and herbal products. Tell your doctor about all your current medicines and any medicine you start or stop using. Where can I get more information? Your pharmacist can provide more information about doxycycline. Remember, keep this and all other medicines out of the reach of children, never share your medicines with others, and use this medication only for the indication prescribed. Every effort has been made to ensure that the information provided by [address] is accurate, up-to-date, and complete, but no guarantee is made to that effect. Drug information contained herein may be time sensitive. [facility] information has been compiled for use by healthcare practitioners and consumers in the [place] and therefore [facility] does not warrant that uses outside of the [place] are appropriate, unless specifically indicated otherwise. [facility]'s drug information does not endorse drugs, diagnose patients or recommend therapy. [facility]'s drug information is an informational resource designed to assist licensed healthcare practitioners in caring for their patients and/or to serve consumers viewing this service as a supplement to, and not a substitute for, the expertise, skill, knowledge and judgment of healthcare practitioners. The absence of a warning for a given drug or drug combination in no way should be construed to indicate that the drug or drug combination is safe, effective or appropriate for any given patient. [facility] does not assume any responsibility for any aspect of healthcare administered with the aid of information [facility] provides. The information contained herein is not intended to cover all possible uses, directions, precautions, warnings, drug interactions, allergic reactions, or adverse effects. If you have questions about the drugs you are taking, check with your doctor, nurse or pharmacist.  Copyright [id number] [address] [place] [address] Dr VELAZQUEZ. Version: 21.03. Revision date: 2/18/2020. Care instructions adapted under license by [facility] ([facility]). If you have questions about a medical condition or this instruction, always ask your healthcare professional. [place] [address] any warranty or liability for your use of this information.

## 2020-12-17 NOTE — PROGRESS NOTES
Acute Office Visit  12/17/2020    SUBJECTIVE:    Patient ID: Mason Odom is a 67 y.o. male. Chief Complaint   Patient presents with    Leg Swelling     bilateral x 3 weeks, redness and warmth     HPI: The patient presents to the office for an acute visit. Pt reports bilateral lower extremity edema for 3-4 weeks. He reports that they are red and warm. He denies pain. Symptoms are stable- not worsening. States he has never had something like this happen before. Denies fevers/chills. No chest pain, palpitations, shortness of breath, trouble breathing, lightheadedness, dizziness or blurred vision. In a wheelchair for the last year per pt. Taking plavix. Had a stroke in 2007. Denies history of DVTs. No Known Allergies     Current Outpatient Medications   Medication Sig Dispense Refill    doxycycline hyclate (VIBRA-TABS) 100 MG tablet Take 1 tablet by mouth 2 times daily for 10 days 20 tablet 0    clopidogrel (PLAVIX) 75 MG tablet Take 1 tablet by mouth daily 90 tablet 1    pantoprazole (PROTONIX) 40 MG tablet Take 1 tablet by mouth daily 90 tablet 1    amLODIPine (NORVASC) 10 MG tablet TAKE 1 TABLET DAILY 90 tablet 1    simvastatin (ZOCOR) 20 MG tablet TAKE 1 TABLET NIGHTLY 90 tablet 1    lisinopril (PRINIVIL;ZESTRIL) 40 MG tablet TAKE 1 TABLET DAILY 90 tablet 1    gabapentin (NEURONTIN) 100 MG capsule TAKE 1 CAPSULE BY MOUTH TWICE A  capsule 0    alendronate (FOSAMAX) 70 MG tablet Take 1 tablet by mouth every 7 days 12 tablet 3    docusate sodium (COLACE) 50 MG capsule Take 1 capsule by mouth daily (Patient taking differently: Take 50 mg by mouth as needed ) 30 capsule 0    clotrimazole-betamethasone (LOTRISONE) 1-0.05 % cream APPLY TOPICALLY TWICE DAILY 45 g 0     No current facility-administered medications for this visit. Review of Systems   Constitutional: Negative for chills, fatigue, fever and unexpected weight change. Eyes: Negative for visual disturbance. Respiratory: Negative for cough, shortness of breath and wheezing. Cardiovascular: Positive for leg swelling. Negative for chest pain and palpitations. Gastrointestinal: Negative for abdominal pain, constipation, diarrhea, nausea and vomiting. Skin: Negative for pallor and rash. Red legs   Neurological: Negative for dizziness, weakness, light-headedness, numbness and headaches. OBJECTIVE:  /72   Pulse 72   Temp 97.5 °F (36.4 °C) (Temporal)   Ht 5' 6\" (1.676 m)   Wt 188 lb 6.4 oz (85.5 kg)   BMI 30.41 kg/m²    Physical Exam  Vitals signs reviewed. Constitutional:       General: He is not in acute distress. Appearance: He is well-developed. He is not diaphoretic. HENT:      Head: Normocephalic and atraumatic. Eyes:      Pupils: Pupils are equal, round, and reactive to light. Cardiovascular:      Rate and Rhythm: Normal rate and regular rhythm. Pulmonary:      Effort: Pulmonary effort is normal. No respiratory distress. Breath sounds: Normal breath sounds. No wheezing or rales. Chest:      Chest wall: No tenderness. Musculoskeletal:      Comments: In wheelchair   Skin:     General: Skin is warm and dry. Comments: Thick, scaling of skin noted with +2 bilateral pitting LE edema and redness from shin down anteriorly and posteriorly. Heat noted bilaterally. Neurological:      Mental Status: He is alert and oriented to person, place, and time. Coordination: Coordination normal.   Psychiatric:         Mood and Affect: Mood normal.        ASSESSMENT/PLAN:  Jessica Phipps was seen today for leg swelling. Diagnoses and all orders for this visit:    Leg swelling/At high risk for falls  -     Lungs clear to ausculatation.   - High likelihood of bilateral cellulitis based on presentation.  - Unlikely DVT due to bilateral in nature with presenting bilateral cellulitis. Reviewed with pt- he will monitor symptoms. - Dr. Britt Velazquez, internal medicine able to evaluate patient- Dr. Britt Velazquez agreeable and recommends treat for cellulitis. - Will treat with doxycycline at this time  - Symptomatic management also reviewed. I recommended the patient elevate his lower extremities and decrease his salt intake. - doxycycline hyclate (VIBRA-TABS) 100 MG tablet; Take 1 tablet by mouth 2 times daily for 10 days  - patient education handout provided and reviewed with the pt. - Patient will call if symptoms worsen or fail to improve  - Return for follow-up in 1 week. - Red flag warning signs reviewed with patient he is agreeable to going the ER if these occur     Return in about 5 days (around 12/22/2020) for cellulitis f/u, or sooner if needed. Pt informed to call if symptoms worsen or fail to improve. All questions answered. Patient states no further questions or concerns at this time. Electronically signed by Dorrie Schlatter, APRN - CNP 12/17/20    On the basis of positive falls risk screening, assessment and plan is as follows: home safety tips provided.

## 2020-12-21 ASSESSMENT — ENCOUNTER SYMPTOMS
ABDOMINAL PAIN: 0
CONSTIPATION: 0
DIARRHEA: 0
NAUSEA: 0
COUGH: 0
VOMITING: 0
WHEEZING: 0
SHORTNESS OF BREATH: 0

## 2020-12-22 ENCOUNTER — OFFICE VISIT (OUTPATIENT)
Dept: INTERNAL MEDICINE CLINIC | Age: 75
End: 2020-12-22
Payer: MEDICARE

## 2020-12-22 VITALS
SYSTOLIC BLOOD PRESSURE: 118 MMHG | BODY MASS INDEX: 29.57 KG/M2 | TEMPERATURE: 96.7 F | HEART RATE: 69 BPM | DIASTOLIC BLOOD PRESSURE: 70 MMHG | HEIGHT: 66 IN | WEIGHT: 184 LBS

## 2020-12-22 PROCEDURE — 99213 OFFICE O/P EST LOW 20 MIN: CPT | Performed by: NURSE PRACTITIONER

## 2020-12-22 NOTE — PROGRESS NOTES
Office Visit  12/22/2020    Subjective:  Chief Complaint   Patient presents with    Follow-up     HPI:  Susana Veras is a 67 y.o. male who presents to the clinic today for follow up.    5 days ago, the patient was started on antibiotics due to bilateral lower extremity cellulitis. He was prescribed doxycycline for 10 days. Today, patient presents and reports he thinks the cellulitis is improving. He states that he is taking ATB as prescribed. He has redness and swelling still present, but improved. Redness resolves with elevation of LEs per pt. Decreased salt intake. No chest pain, palpitations, shortness of breath, trouble breathing, lightheadedness, dizziness or blurred vision. Review of Systems   Constitutional: Negative for chills, fatigue, fever and unexpected weight change. Eyes: Negative for visual disturbance. Respiratory: Negative for cough, shortness of breath and wheezing. Cardiovascular: Positive for leg swelling. Negative for chest pain and palpitations. Gastrointestinal: Negative for abdominal pain, constipation, diarrhea, nausea and vomiting. Skin: Negative for pallor and rash. Neurological: Negative for dizziness, weakness, light-headedness, numbness and headaches.      No Known Allergies    Current Outpatient Rx   Medication Sig Dispense Refill    doxycycline hyclate (VIBRA-TABS) 100 MG tablet Take 1 tablet by mouth 2 times daily for 10 days 20 tablet 0    clopidogrel (PLAVIX) 75 MG tablet Take 1 tablet by mouth daily 90 tablet 1    pantoprazole (PROTONIX) 40 MG tablet Take 1 tablet by mouth daily 90 tablet 1    amLODIPine (NORVASC) 10 MG tablet TAKE 1 TABLET DAILY 90 tablet 1    simvastatin (ZOCOR) 20 MG tablet TAKE 1 TABLET NIGHTLY 90 tablet 1    lisinopril (PRINIVIL;ZESTRIL) 40 MG tablet TAKE 1 TABLET DAILY 90 tablet 1    gabapentin (NEURONTIN) 100 MG capsule TAKE 1 CAPSULE BY MOUTH TWICE A  capsule 0  alendronate (FOSAMAX) 70 MG tablet Take 1 tablet by mouth every 7 days 12 tablet 3    docusate sodium (COLACE) 50 MG capsule Take 1 capsule by mouth daily (Patient taking differently: Take 50 mg by mouth as needed ) 30 capsule 0    clotrimazole-betamethasone (LOTRISONE) 1-0.05 % cream APPLY TOPICALLY TWICE DAILY 45 g 0     Patient Active Problem List   Diagnosis    Hypertension    Cerebrovascular disease    DDD (degenerative disc disease), lumbosacral    Displacement of lumbar intervertebral disc without myelopathy    Hyperlipidemia    Carotid artery stenosis    Conductive hearing loss of both ears    Thrombocytopenia (HCC)    Acute kidney injury ([address]    Gastroesophageal reflux disease without esophagitis    Right hemiparesis (HCC)    Ataxia    Hemiparesis due to old lacunar stroke (HCC)    Vertigo, benign paroxysmal    CAD in native artery    Age-related osteoporosis without current pathological fracture    Hyperglycemia    Chest pain    Chronic pain of left upper extremity      Wt Readings from Last 3 Encounters:   12/22/20 184 lb (83.5 kg)   12/17/20 188 lb 6.4 oz (85.5 kg)   07/15/20 188 lb 3.2 oz (85.4 kg)     BP Readings from Last 3 Encounters:   12/22/20 118/70   12/17/20 122/72   07/15/20 124/82     Objective/Physical Exam:  /70   Pulse 69   Temp 96.7 °F (35.9 °C) (Temporal)   Ht 5' 6\" (1.676 m)   Wt 184 lb (83.5 kg)   BMI 29.70 kg/m²   Body mass index is 29.7 kg/m². Physical Exam  Vitals signs reviewed. Constitutional:       General: He is not in acute distress. Appearance: He is well-developed. He is not diaphoretic. HENT:      Head: Normocephalic and atraumatic. Eyes:      Pupils: Pupils are equal, round, and reactive to light. Cardiovascular:      Rate and Rhythm: Normal rate and regular rhythm. Pulmonary:      Effort: Pulmonary effort is normal. No respiratory distress. Breath sounds: Normal breath sounds. No wheezing or rales.    Chest: Chest wall: No tenderness. Abdominal:      General: Bowel sounds are normal.      Palpations: Abdomen is soft. Musculoskeletal:      Comments: Thick, scaling of skin noted with +1 bilateral pitting LE edema and redness (significantly less redness from last visit) from shin down anteriorly and posteriorly. Heat noted bilaterally. Skin:     General: Skin is warm and dry. Coloration: Skin is not pale. Findings: No erythema or rash. Neurological:      Mental Status: He is alert and oriented to person, place, and time. Coordination: Coordination normal.   Psychiatric:         Mood and Affect: Mood normal.       Assessment and Plan:  Betty Singletary was seen today for follow-up. Diagnoses and all orders for this visit:    Leg swelling   - Improving, but still present   - Continue ATB and symptomatic management.   - Has f/u with PCP Monday. Will forward note for re-evaluation at that time. Has enough ATB to get him to this appt. - Pt will call if symptoms worsen or fail to improve   - Red flag warning signs reviewed with the pt and he will go to the ER if these occur. Return for keep PCP appt monday, or sooner if needed. Pt will call if symptoms worsen or fail to improve. All questions answered. Pt states no further questions or concerns at this time.    Electronically signed by: Dorrie Schlatter 12/22/20

## 2021-01-04 ENCOUNTER — OFFICE VISIT (OUTPATIENT)
Dept: INTERNAL MEDICINE CLINIC | Age: 76
End: 2021-01-04
Payer: MEDICARE

## 2021-01-04 VITALS
DIASTOLIC BLOOD PRESSURE: 70 MMHG | TEMPERATURE: 97.3 F | SYSTOLIC BLOOD PRESSURE: 112 MMHG | OXYGEN SATURATION: 99 % | HEIGHT: 66 IN | WEIGHT: 187 LBS | BODY MASS INDEX: 30.05 KG/M2 | HEART RATE: 72 BPM

## 2021-01-04 DIAGNOSIS — I10 ESSENTIAL HYPERTENSION: Chronic | ICD-10-CM

## 2021-01-04 DIAGNOSIS — I65.23 BILATERAL CAROTID ARTERY STENOSIS: Chronic | ICD-10-CM

## 2021-01-04 DIAGNOSIS — R60.0 BILATERAL LOWER EXTREMITY EDEMA: ICD-10-CM

## 2021-01-04 DIAGNOSIS — E78.5 HYPERLIPIDEMIA, UNSPECIFIED HYPERLIPIDEMIA TYPE: Chronic | ICD-10-CM

## 2021-01-04 DIAGNOSIS — L30.9 DERMATITIS: ICD-10-CM

## 2021-01-04 DIAGNOSIS — Z00.00 ROUTINE GENERAL MEDICAL EXAMINATION AT A HEALTH CARE FACILITY: Primary | ICD-10-CM

## 2021-01-04 DIAGNOSIS — R09.89 DECREASED PULSES IN FEET: ICD-10-CM

## 2021-01-04 LAB
HCT VFR BLD CALC: 48.1 % (ref 40.5–52.5)
HEMOGLOBIN: 16.2 G/DL (ref 13.5–17.5)
MCH RBC QN AUTO: 30.9 PG (ref 26–34)
MCHC RBC AUTO-ENTMCNC: 33.6 G/DL (ref 31–36)
MCV RBC AUTO: 91.9 FL (ref 80–100)
PDW BLD-RTO: 14.6 % (ref 12.4–15.4)
PLATELET # BLD: 118 K/UL (ref 135–450)
PMV BLD AUTO: 9.3 FL (ref 5–10.5)
RBC # BLD: 5.23 M/UL (ref 4.2–5.9)
WBC # BLD: 5.4 K/UL (ref 4–11)

## 2021-01-04 PROCEDURE — G0439 PPPS, SUBSEQ VISIT: HCPCS | Performed by: NURSE PRACTITIONER

## 2021-01-04 RX ORDER — CLOTRIMAZOLE AND BETAMETHASONE DIPROPIONATE 10; .64 MG/G; MG/G
CREAM TOPICAL
Qty: 45 G | Refills: 0 | Status: SHIPPED | OUTPATIENT
Start: 2021-01-04

## 2021-01-04 ASSESSMENT — LIFESTYLE VARIABLES: HOW OFTEN DO YOU HAVE A DRINK CONTAINING ALCOHOL: 0

## 2021-01-04 ASSESSMENT — PATIENT HEALTH QUESTIONNAIRE - PHQ9: SUM OF ALL RESPONSES TO PHQ9 QUESTIONS 1 & 2: 0

## 2021-01-04 NOTE — PATIENT INSTRUCTIONS
Personalized Preventive Plan for [place] - 1/4/2021  Medicare offers a range of preventive health benefits. Some of the tests and screenings are paid in full while other may be subject to a deductible, co-insurance, and/or copay. Some of these benefits include a comprehensive review of your medical history including lifestyle, illnesses that may run in your family, and various assessments and screenings as appropriate. After reviewing your medical record and screening and assessments performed today your provider may have ordered immunizations, labs, imaging, and/or referrals for you. A list of these orders (if applicable) as well as your Preventive Care list are included within your After Visit Summary for your review. Other Preventive Recommendations:    · A preventive eye exam performed by an eye specialist is recommended every 1-2 years to screen for glaucoma; cataracts, macular degeneration, and other eye disorders. · A preventive dental visit is recommended every 6 months. · Try to get at least 150 minutes of exercise per week or 10,000 steps per day on a pedometer . · Order or download the FREE \"Exercise & Physical Activity: Your Everyday Guide\" from [employer]. Call 7-178.809.5094 or search [employer] online. · You need 3648-2185 mg of calcium and 1151-6691 IU of vitamin D per day. It is possible to meet your calcium requirement with diet alone, but a vitamin D supplement is usually necessary to meet this goal.  · When exposed to the sun, use a sunscreen that protects against both UVA and UVB radiation with an SPF of 30 or greater. Reapply every 2 to 3 hours or after sweating, drying off with a towel, or swimming. · Always wear a seat belt when traveling in a car. Always wear a helmet when riding a bicycle or motorcycle.
99

## 2021-01-04 NOTE — PROGRESS NOTES
Patient taking differently: Take 50 mg by mouth as needed  Yes Antonia Heredia MD         Past Medical History:   Diagnosis Date    Cerebrovascular disease 2007    Right leg weakness    Hyperlipidemia     Hypertension     Osteoarthritis     Risk for falls 06/09/2017    YO 17[birth year]    Unspecified cerebral artery occlusion with cerebral infarction        Past Surgical History:   Procedure Laterality Date    HERNIA REPAIR      NOSE SURGERY           Family History   Problem Relation Age of Onset    Heart Disease Father 64    Diabetes Mother     Heart Disease Mother     High Blood Pressure Mother     Breast Cancer Maternal Grandmother     Breast Cancer Sister        CareTeam (Including outside providers/suppliers regularly involved in providing care):   Patient Care Team:  Pamula Form, OLGA - CNP as PCP - General (Certified Nurse Practitioner)  Pamula Form, OLGA Massey CNP as PCP - [address] [place] [address] Provider  John Vega MD as Consulting Physician (Otolaryngology)    Wt Readings from Last 3 Encounters:   01/04/21 187 lb (84.8 kg)   12/22/20 184 lb (83.5 kg)   12/17/20 188 lb 6.4 oz (85.5 kg)     Vitals:    01/04/21 1119   BP: 112/70   Pulse: 72   Temp: 97.3 °F (36.3 °C)   SpO2: 99%   Weight: 187 lb (84.8 kg)   Height: 5' 6\" (1.676 m)     Body mass index is 30.18 kg/m². Based upon direct observation of the patient, evaluation of cognition reveals recent and remote memory intact. Physical Exam  Vitals signs reviewed. Constitutional:       General: He is not in acute distress. Appearance: Normal appearance. He is well-developed. He is not ill-appearing or diaphoretic. HENT:      Head: Normocephalic and atraumatic. Cardiovascular:      Rate and Rhythm: Normal rate and regular rhythm. Pulses:           Dorsalis pedis pulses are 1+ on the right side and 1+ on the left side. Heart sounds: Normal heart sounds. No murmur.    Pulmonary: Effort: Pulmonary effort is normal. No respiratory distress. Breath sounds: Normal breath sounds. No wheezing or rhonchi. Musculoskeletal:      Right lower leg: Edema (trace ) present. Left lower leg: Edema (trace) present. Skin:     General: Skin is warm and dry. Comments: Thick scaling skin to BLE, minimal erythema, trace BLE edema, no warmth    Neurological:      Mental Status: He is alert and oriented to person, place, and time. Mental status is at baseline. Comments: In w/c   Psychiatric:         Mood and Affect: Mood and affect normal.         Behavior: Behavior normal.       Patient's complete Health Risk Assessment and screening values have been reviewed and are found in Flowsheets. The following problems were reviewed today and where indicated follow up appointments were made and/or referrals ordered. Positive Risk Factor Screenings with Interventions:           Health Habits/Nutrition:  Health Habits/Nutrition  Do you exercise for at least 20 minutes 2-3 times per week?: (!) No  Have you lost any weight without trying in the past 3 months?: No  Do you eat fewer than 2 meals per day?: (!) Yes(sometimes)  Have you seen a dentist within the past year?: Yes  Body mass index: (!) 30.18  Health Habits/Nutrition Interventions:  · declined      ADL:  ADLs  In the past 7 days, did you need help from others to perform any of the following everyday activities? Eating, dressing, grooming, bathing, toileting, or walking/balance?: None  In the past 7 days, did you need help from others to take care of any of the following?  Laundry, housekeeping, banking/finances, shopping, telephone use, food preparation, transportation, or taking medications?: (!) Banking/Finances, Transportation, Shopping, Food Preparation  ADL Interventions:  · Patient declines any further evaluation/treatment for this issue  · has support at his facility     Personalized Preventive Plan -     VL CHATA BILATERAL LIMITED 1-2 LEVELS; Future    Decreased pulses in feet  See above   -     VL CHATA BILATERAL LIMITED 1-2 LEVELS; Future    Bilateral carotid artery stenosis  See above   -     Lipid Panel; Future    Essential hypertension  Stable, controlled on current regimen. -     Comprehensive Metabolic Panel; Future  -     CBC; Future    Hyperlipidemia, unspecified hyperlipidemia type  Stable, controlled on current regimen.     -     Lipid Panel;  Future    Dermatitis  -     clotrimazole-betamethasone (LOTRISONE) 1-0.05 % cream; APPLY TOPICALLY TWICE DAILY    Other orders  -     Compression Stockings MISC; Compression for BLE daily    F/u in 3 months and prn     Electronically signed by OLGA Merlos - CNP on 1/4/2021 at 1:05 PM

## 2021-01-05 LAB
A/G RATIO: 2 (ref 1.1–2.2)
ALBUMIN SERPL-MCNC: 4.4 G/DL (ref 3.4–5)
ALP BLD-CCNC: 56 U/L (ref 40–129)
ALT SERPL-CCNC: 19 U/L (ref 10–40)
ANION GAP SERPL CALCULATED.3IONS-SCNC: 13 MMOL/L (ref 3–16)
AST SERPL-CCNC: 26 U/L (ref 15–37)
BILIRUB SERPL-MCNC: 0.7 MG/DL (ref 0–1)
BUN BLDV-MCNC: 16 MG/DL (ref 7–20)
CALCIUM SERPL-MCNC: 9.6 MG/DL (ref 8.3–10.6)
CHLORIDE BLD-SCNC: 103 MMOL/L (ref 99–110)
CHOLESTEROL, TOTAL: 104 MG/DL (ref 0–199)
CO2: 24 MMOL/L (ref 21–32)
CREAT SERPL-MCNC: 1.2 MG/DL (ref 0.8–1.3)
GFR AFRICAN AMERICAN: >60
GFR NON-AFRICAN AMERICAN: 59
GLOBULIN: 2.2 G/DL
GLUCOSE BLD-MCNC: 107 MG/DL (ref 70–99)
HDLC SERPL-MCNC: 43 MG/DL (ref 40–60)
LDL CHOLESTEROL CALCULATED: 42 MG/DL
POTASSIUM SERPL-SCNC: 3.7 MMOL/L (ref 3.5–5.1)
SODIUM BLD-SCNC: 140 MMOL/L (ref 136–145)
TOTAL PROTEIN: 6.6 G/DL (ref 6.4–8.2)
TRIGL SERPL-MCNC: 96 MG/DL (ref 0–150)
VLDLC SERPL CALC-MCNC: 19 MG/DL

## 2021-01-11 ENCOUNTER — APPOINTMENT (OUTPATIENT)
Dept: CT IMAGING | Age: 76
End: 2021-01-11
Payer: MEDICARE

## 2021-01-11 ENCOUNTER — HOSPITAL ENCOUNTER (EMERGENCY)
Age: 76
Discharge: HOME | End: 2021-01-11
Attending: EMERGENCY MEDICINE
Payer: MEDICARE

## 2021-01-11 VITALS
HEIGHT: 66 IN | WEIGHT: 187 LBS | RESPIRATION RATE: 18 BRPM | TEMPERATURE: 98.1 F | BODY MASS INDEX: 30.05 KG/M2 | OXYGEN SATURATION: 96 % | HEART RATE: 79 BPM | SYSTOLIC BLOOD PRESSURE: 144 MMHG | DIASTOLIC BLOOD PRESSURE: 80 MMHG

## 2021-01-11 DIAGNOSIS — S22.31XA CLOSED FRACTURE OF ONE RIB OF RIGHT SIDE, INITIAL ENCOUNTER: Primary | ICD-10-CM

## 2021-01-11 LAB
A/G RATIO: 1.2 (ref 1.1–2.2)
ALBUMIN SERPL-MCNC: 4.1 G/DL (ref 3.4–5)
ALP BLD-CCNC: 56 U/L (ref 40–129)
ALT SERPL-CCNC: 24 U/L (ref 10–40)
ANION GAP SERPL CALCULATED.3IONS-SCNC: 12 MMOL/L (ref 3–16)
APTT: 33.7 SEC (ref 24.2–36.2)
AST SERPL-CCNC: 30 U/L (ref 15–37)
BASOPHILS ABSOLUTE: 0 K/UL (ref 0–0.2)
BASOPHILS RELATIVE PERCENT: 0.6 %
BILIRUB SERPL-MCNC: 0.5 MG/DL (ref 0–1)
BILIRUBIN URINE: NEGATIVE
BLOOD, URINE: NEGATIVE
BUN BLDV-MCNC: 17 MG/DL (ref 7–20)
CALCIUM SERPL-MCNC: 9.8 MG/DL (ref 8.3–10.6)
CHLORIDE BLD-SCNC: 102 MMOL/L (ref 99–110)
CLARITY: CLEAR
CO2: 20 MMOL/L (ref 21–32)
COLOR: YELLOW
CREAT SERPL-MCNC: 1.1 MG/DL (ref 0.8–1.3)
EOSINOPHILS ABSOLUTE: 0.1 K/UL (ref 0–0.6)
EOSINOPHILS RELATIVE PERCENT: 2.3 %
GFR AFRICAN AMERICAN: >60
GFR NON-AFRICAN AMERICAN: >60
GLOBULIN: 3.3 G/DL
GLUCOSE BLD-MCNC: 140 MG/DL (ref 70–99)
GLUCOSE URINE: NEGATIVE MG/DL
HCT VFR BLD CALC: 50.1 % (ref 40.5–52.5)
HEMOGLOBIN: 16.4 G/DL (ref 13.5–17.5)
INR BLD: 0.93 (ref 0.86–1.14)
KETONES, URINE: NEGATIVE MG/DL
LEUKOCYTE ESTERASE, URINE: NEGATIVE
LYMPHOCYTES ABSOLUTE: 0.8 K/UL (ref 1–5.1)
LYMPHOCYTES RELATIVE PERCENT: 16.3 %
MCH RBC QN AUTO: 30.8 PG (ref 26–34)
MCHC RBC AUTO-ENTMCNC: 32.8 G/DL (ref 31–36)
MCV RBC AUTO: 93.8 FL (ref 80–100)
MICROSCOPIC EXAMINATION: NORMAL
MONOCYTES ABSOLUTE: 0.8 K/UL (ref 0–1.3)
MONOCYTES RELATIVE PERCENT: 17 %
NEUTROPHILS ABSOLUTE: 3.1 K/UL (ref 1.7–7.7)
NEUTROPHILS RELATIVE PERCENT: 63.8 %
NITRITE, URINE: NEGATIVE
PDW BLD-RTO: 14.2 % (ref 12.4–15.4)
PH UA: 5.5 (ref 5–8)
PLATELET # BLD: 131 K/UL (ref 135–450)
PMV BLD AUTO: 8 FL (ref 5–10.5)
POTASSIUM SERPL-SCNC: 3.8 MMOL/L (ref 3.5–5.1)
PROTEIN UA: NEGATIVE MG/DL
PROTHROMBIN TIME: 10.8 SEC (ref 10–13.2)
RBC # BLD: 5.34 M/UL (ref 4.2–5.9)
SODIUM BLD-SCNC: 134 MMOL/L (ref 136–145)
SPECIFIC GRAVITY UA: 1.01 (ref 1–1.03)
TOTAL PROTEIN: 7.4 G/DL (ref 6.4–8.2)
URINE REFLEX TO CULTURE: NORMAL
URINE TYPE: NORMAL
UROBILINOGEN, URINE: 0.2 E.U./DL
WBC # BLD: 4.9 K/UL (ref 4–11)

## 2021-01-11 PROCEDURE — 93005 ELECTROCARDIOGRAM TRACING: CPT | Performed by: EMERGENCY MEDICINE

## 2021-01-11 PROCEDURE — 96374 THER/PROPH/DIAG INJ IV PUSH: CPT

## 2021-01-11 PROCEDURE — 96375 TX/PRO/DX INJ NEW DRUG ADDON: CPT

## 2021-01-11 PROCEDURE — 71260 CT THORAX DX C+: CPT

## 2021-01-11 PROCEDURE — 72125 CT NECK SPINE W/O DYE: CPT

## 2021-01-11 PROCEDURE — 6360000004 HC RX CONTRAST MEDICATION: Performed by: PHYSICIAN ASSISTANT

## 2021-01-11 PROCEDURE — 3209999900 CT LUMBAR SPINE TRAUMA RECONSTRUCTION

## 2021-01-11 PROCEDURE — 3209999900 CT THORACIC SPINE TRAUMA RECONSTRUCTION

## 2021-01-11 PROCEDURE — 80053 COMPREHEN METABOLIC PANEL: CPT

## 2021-01-11 PROCEDURE — 85025 COMPLETE CBC W/AUTO DIFF WBC: CPT

## 2021-01-11 PROCEDURE — 2580000003 HC RX 258: Performed by: PHYSICIAN ASSISTANT

## 2021-01-11 PROCEDURE — 81003 URINALYSIS AUTO W/O SCOPE: CPT

## 2021-01-11 PROCEDURE — 85730 THROMBOPLASTIN TIME PARTIAL: CPT

## 2021-01-11 PROCEDURE — 6360000002 HC RX W HCPCS: Performed by: PHYSICIAN ASSISTANT

## 2021-01-11 PROCEDURE — 85610 PROTHROMBIN TIME: CPT

## 2021-01-11 PROCEDURE — 99283 EMERGENCY DEPT VISIT LOW MDM: CPT

## 2021-01-11 PROCEDURE — 36415 COLL VENOUS BLD VENIPUNCTURE: CPT

## 2021-01-11 PROCEDURE — 70450 CT HEAD/BRAIN W/O DYE: CPT

## 2021-01-11 RX ORDER — HYDROCODONE BITARTRATE AND ACETAMINOPHEN 5; 325 MG/1; MG/1
1 TABLET ORAL EVERY 6 HOURS PRN
Qty: 10 TABLET | Refills: 0 | Status: SHIPPED | OUTPATIENT
Start: 2021-01-11 | End: 2021-01-14

## 2021-01-11 RX ORDER — MORPHINE SULFATE 4 MG/ML
4 INJECTION, SOLUTION INTRAMUSCULAR; INTRAVENOUS EVERY 30 MIN PRN
Status: DISCONTINUED | OUTPATIENT
Start: 2021-01-11 | End: 2021-01-11 | Stop reason: HOSPADM

## 2021-01-11 RX ORDER — 0.9 % SODIUM CHLORIDE 0.9 %
1000 INTRAVENOUS SOLUTION INTRAVENOUS ONCE
Status: COMPLETED | OUTPATIENT
Start: 2021-01-11 | End: 2021-01-11

## 2021-01-11 RX ORDER — ONDANSETRON 4 MG/1
4 TABLET, ORALLY DISINTEGRATING ORAL EVERY 8 HOURS PRN
Qty: 20 TABLET | Refills: 0 | Status: SHIPPED | OUTPATIENT
Start: 2021-01-11 | End: 2021-04-06 | Stop reason: ALTCHOICE

## 2021-01-11 RX ORDER — ONDANSETRON 2 MG/ML
4 INJECTION INTRAMUSCULAR; INTRAVENOUS ONCE
Status: COMPLETED | OUTPATIENT
Start: 2021-01-11 | End: 2021-01-11

## 2021-01-11 RX ADMIN — ONDANSETRON 4 MG: 2 INJECTION INTRAMUSCULAR; INTRAVENOUS at 15:19

## 2021-01-11 RX ADMIN — MORPHINE SULFATE 4 MG: 4 INJECTION INTRAVENOUS at 15:19

## 2021-01-11 RX ADMIN — SODIUM CHLORIDE 1000 ML: 9 INJECTION, SOLUTION INTRAVENOUS at 15:19

## 2021-01-11 RX ADMIN — IOPAMIDOL 75 ML: 755 INJECTION, SOLUTION INTRAVENOUS at 16:45

## 2021-01-11 ASSESSMENT — ENCOUNTER SYMPTOMS
DIARRHEA: 0
RHINORRHEA: 0
ABDOMINAL PAIN: 0
SHORTNESS OF BREATH: 1
COUGH: 0
VOMITING: 0
BACK PAIN: 0
NAUSEA: 0

## 2021-01-11 ASSESSMENT — PAIN SCALES - GENERAL
PAINLEVEL_OUTOF10: 8
PAINLEVEL_OUTOF10: 8

## 2021-01-11 NOTE — ED PROVIDER NOTES
I independently performed a history and physical on [address]. All diagnostic, treatment, and disposition decisions were made by myself in conjunction with the advanced practice provider. I have participated in the medical decision making and directed the treatment plan and disposition of the patient. For further details of [address] emergency department encounter, please see the advanced practice provider's documentation. CHIEF COMPLAINT  Chief Complaint   Patient presents with    Shortness of Breath     in by EMS from [address] [facility], patient had a fall last monday has large bruising noted to right flank area and now having SOB sometimes that comes and goes, patient has bilateral lower leg cellulitis and his PCP is aware. Briefly, Monica Ghosh is a 67 y.o. male  who presents to the ED complaining of being from [facility], fell last Monday and has R flank pain and some SOB since the fall. Says he scooted/fell from his chair onto his buttocks and right side. No head or neck pain. Called for help and help came shortly, got back into the chair, no prolonged downtime (~5 min). Did not get medical attention otherwise. Since then, over the past week and the last few nights in particular he has had ongoing pain with difficulty sleeping. He has ongoing R flank pain. No pains in the arms or legs. The original wall was purely mechanical sliding out from the chair, NOT dizzy or syncopal and no LOC. On plavix.     FOCUSED PHYSICAL EXAMINATION  /72   Pulse 69   Temp 98.1 °F (36.7 °C)   Resp 18   Ht 5' 6\" (1.676 m)   Wt 187 lb (84.8 kg)   SpO2 92%   BMI 30.18 kg/m² Focused physical examination notable for no acute distress, well-appearing, well-nourished, normal speech and mentation without obvious facial droop, no obvious rash. No obvious cranial nerve deficits on my initial exam. RRR, CTAB. No ttp in extremities anywhere. NC/AT. No C/T/L spine midline ttp. Midline abdomen is nontender, L abd flank and CVA nontender, ++R abd and flank ttp with CVA ttp as well, bruising diffusely on the R flank. The 12 lead EKG was interpreted by me as follows:  Rate: normal with a rate of 71  Rhythm: suspect sinus with artifact, which limits interpretation  Axis: normal  Intervals: normal MD, narrow QRS, normal QTc  ST segments: no ST elevations or depressions  T waves: no abnormal inversions  Non-specific T wave changes: present  Prior EKG comparison: EKG dated 4/7/19 is not significantly different      MDM:  Diagnostic considerations included intracranial injury, cervical spine injury, chest/abdominal organ injury, extremity injury, abrasion/laceration, contusion, fracture, sprain/strain, dislocation    ED course was notable for isolated rib fracture on the right side as well as a right flank contusion. The injury was last week. Given no evidence of hemopneumothorax, intra-abdominal bleeding, stable vitals and no other injuries identified on CT of the head cervical spine chest abdomen or pelvis I do feel that discharge is appropriate. He is not hypoxic or splinting at this time. He was ordered an incentive spirometer and will be sent home with pain and nausea medications. Follow-up PCP. No other injuries noted.     During the patient's ED course, the patient was given:  Medications   morphine injection 4 mg (4 mg Intravenous Given 1/11/21 1519)   0.9 % sodium chloride bolus (0 mLs Intravenous Stopped 1/11/21 1642)   ondansetron (ZOFRAN) injection 4 mg (4 mg Intravenous Given 1/11/21 1519)   iopamidol (ISOVUE-370) 76 % injection 75 mL (75 mLs Intravenous Given 1/11/21 1645) CLINICAL IMPRESSION  1. Closed fracture of one rib of right side, initial encounter        DISPOSITION  Gayatri Demarco was discharged to home in stable condition. I have discussed the findings of today's workup with the patient and addressed the patient's questions and concerns. Important warning signs as well as new or worsening symptoms which would necessitate immediate return to the ED were discussed. The plan is to discharge from the ED at this time, and the patient is in stable condition. The patient acknowledged understanding is agreeable with this plan. Patient was given scripts for the following medications. I counseled patient how to take these medications. New Prescriptions    HYDROCODONE-ACETAMINOPHEN (NORCO) 5-325 MG PER TABLET    Take 1 tablet by mouth every 6 hours as needed for Pain for up to 3 days. ONDANSETRON (ZOFRAN ODT) 4 MG DISINTEGRATING TABLET    Take 1 tablet by mouth every 8 hours as needed for Nausea     Follow-up with:  OLGA Singh CNP  [address] [facility]  [phone]    Schedule an appointment as soon as possible for a visit in 2 days      [facility] Emergency Department  [address] [facility]  [phone]    As needed, If symptoms worsen      This chart was created using Dragon dictation software. Efforts were made by me to ensure accuracy, however some errors may be present due to limitations of this technology.             Audrey Mccabe MD  01/11/21 [place]

## 2021-01-11 NOTE — ED NOTES
Attempted to call report x2, was unable to get ahold of anyone at [facility].       Shade Evans, RN  01/11/21 3383

## 2021-01-11 NOTE — ED NOTES
Bed: 30  Expected date:   Expected time:   Means of arrival: [facility]  Comments:  [address], RN  01/11/21 1462

## 2021-01-11 NOTE — ED PROVIDER NOTES
[address] ENCOUNTER        Pt Name: Adair Yanez  MRN: [id number]  Venusgfjhoana 1945  Date of evaluation: 1/11/2021  Provider: Kirit Maloney PA-C  PCP: OLGA Coleman - CNP     I have seen and evaluated this patient with my supervising physician Shawanda Landaverde MD.    [facility]       Chief Complaint   Patient presents with    Shortness of Breath     in by EMS from [facility], patient had a fall last monday has large bruising noted to right flank area and now having SOB sometimes that comes and goes, patient has bilateral lower leg cellulitis and his PCP is aware. HISTORY OF PRESENT ILLNESS   (Location, Timing/Onset, Context/Setting, Quality, Duration, Modifying Factors, Severity, Associated Signs and Symptoms)  Note limiting factors. Ariel Panda is a 67 y.o. male there presents to the emergency department today for evaluation for shortness of breath. The patient is from [address] [place] [address]. The patient has a history of hypertension, hyperlipidemia. The patient is on Plavix, as well as a baby aspirin. The patient states that he was sitting in his wheelchair, last week, and he states that the back of the chair was locked he states that he dropped something, and he reached around to pick this up, and he states that the chair was not locked so he slid out of the chair and slid against the wall. The patient does not believe that he hit his head, there was no loss of consciousness. There was no vomiting. The patient states that he has been experiencing pain to his right flank, and right chest since then. He states that his pain is sharp, constant is rated as an 8/10. He states that his pain is worse with touch and certain movements. He states he has had some trouble breathing secondary to the pain. The patient states he does have a large bruise noted to the right flank. Patient has not had any hematuria. He has not had any headaches. No visual changes. No numbness tingling or weakness. No chest pain. No recent illnesses. No other complaints at this time    Nursing Notes were all reviewed and agreed with or any disagreements were addressed in the HPI. REVIEW OF SYSTEMS    (2-9 systems for level 4, 10 or more for level 5)     Review of Systems   Constitutional: Negative for activity change, appetite change, chills and fever. HENT: Negative for congestion and rhinorrhea. Respiratory: Positive for shortness of breath. Negative for cough. Cardiovascular: Negative for chest pain. Gastrointestinal: Negative for abdominal pain, diarrhea, nausea and vomiting. Genitourinary: Negative for difficulty urinating, dysuria and hematuria. Musculoskeletal: Positive for arthralgias. Negative for back pain and neck pain. Neurological: Negative for weakness and numbness. Positives and Pertinent negatives as per HPI. Except as noted above in the ROS, all other systems were reviewed and negative. PAST MEDICAL HISTORY     Past Medical History:   Diagnosis Date    Cerebrovascular disease 2007    Right leg weakness    Hyperlipidemia     Hypertension     Osteoarthritis     Risk for falls 06/09/2017    YO 18/56    Unspecified cerebral artery occlusion with cerebral infarction          SURGICAL HISTORY     Past Surgical History:   Procedure Laterality Date    HERNIA REPAIR      NOSE SURGERY           CURRENTMEDICATIONS       Discharge Medication List as of 1/11/2021  6:53 PM      CONTINUE these medications which have NOT CHANGED    Details   clotrimazole-betamethasone (LOTRISONE) 1-0.05 % cream APPLY TOPICALLY TWICE DAILY, Disp-45 g, R-0, Normal      Compression Stockings MISC Starting Mon 1/4/2021, Disp-1 each, R-0, NormalCompression for BLE daily      clopidogrel (PLAVIX) 75 MG tablet Take 1 tablet by mouth daily, Disp-90 tablet, R-1Normal      pantoprazole (PROTONIX) 40 MG tablet Take 1 tablet by mouth daily, Disp-90 tablet, R-1Normal      amLODIPine (NORVASC) 10 MG tablet TAKE 1 TABLET DAILY, Disp-90 tablet, R-1Normal      simvastatin (ZOCOR) 20 MG tablet TAKE 1 TABLET NIGHTLY, Disp-90 tablet, R-1Normal      lisinopril (PRINIVIL;ZESTRIL) 40 MG tablet TAKE 1 TABLET DAILY, Disp-90 tablet, R-1Normal      gabapentin (NEURONTIN) 100 MG capsule TAKE 1 CAPSULE BY MOUTH TWICE A DAY, Disp-180 capsule,R-0Normal      alendronate (FOSAMAX) 70 MG tablet Take 1 tablet by mouth every 7 days, Disp-12 tablet, R-3Normal      docusate sodium (COLACE) 50 MG capsule Take 1 capsule by mouth daily, Disp-30 capsule, R-0Normal               ALLERGIES     Patient has no known allergies.     FAMILYHISTORY       Family History   Problem Relation Age of Onset    Heart Disease Father 64    Diabetes Mother     Heart Disease Mother Comments: No midline spinal tenderness. There is tenderness palpation to the paraspinous muscles of the thoracic and lumbar spine. No bony step-offs or crepitus. Skin:     General: Skin is warm and dry. Neurological:      Mental Status: He is alert and oriented to person, place, and time. Psychiatric:         Behavior: Behavior normal.         DIAGNOSTIC RESULTS   LABS:    Labs Reviewed   CBC WITH AUTO DIFFERENTIAL - Abnormal; Notable for the following components:       Result Value    Platelets 056 (*)     Lymphocytes Absolute 0.8 (*)     All other components within normal limits    Narrative:     Performed at:  [facility] [address], [place], [employer] [place]   Phone [phone]   COMPREHENSIVE METABOLIC PANEL - Abnormal; Notable for the following components:    Sodium 134 (*)     CO2 20 (*)     Glucose 140 (*)     All other components within normal limits    Narrative:     Performed at:  [facility] [address] [facility] [address]  [place], [place]   Phone [phone]   PROTIME-INR    Narrative:     Performed at:  [facility] [address], [place], [employer]   Phone [phone]   APTT    Narrative:     Performed at:  [facility] [address], [place], [employer]   Phone [phone]   URINE RT REFLEX TO CULTURE    Narrative:     Performed at:  [facility] [address], [place], [employer]   Phone [phone]       All other labs were within normal range or not returned as of this dictation. EKG: All EKG's are interpreted by the Emergency Department Physician in the absence of a cardiologist.  Please see their note for interpretation of EKG.       RADIOLOGY: BP: 127/72  (!) 144/80    Pulse: 70 66 71 79   Resp:   18    Temp:   98.1 °F (36.7 °C)    SpO2: (!) 87% 90% 96%    Weight:       Height:           Patient was given the following medications:  Medications   0.9 % sodium chloride bolus (0 mLs Intravenous Stopped 1/11/21 1642)   ondansetron (ZOFRAN) injection 4 mg (4 mg Intravenous Given 1/11/21 1519)   iopamidol (ISOVUE-370) 76 % injection 75 mL (75 mLs Intravenous Given 1/11/21 1645)           Briefly, this is a 41-year-old male who presents to the emergency department today for evaluation for a fall which occurred 1 week ago. The patient is on Plavix as well as a baby aspirin. He tells me that he slid out of the wheelchair, and since that time he has had shortness of breath, secondary to right flank pain and right rib pain. On examination he does have a large bruise extending from the right flank, to the right lower abdomen. He is reproducible tenderness over the right flank, and over the right ribs. His CBC shows no evidence of leukocytosis or anemia. His CMP is unremarkable. Urine shows no evidence of infection or blood. Coags are unremarkable    CT of head and cervical spine are negative. CT of thoracic spine and lumbar spine show an old compression fracture, no acute fracture. CT of chest abdomen and pelvis obtained to rule out hematoma, retroperitoneal hemorrhage. There is an acute nondisplaced fracture laterally in the right 10th rib. There is a, moderate contusion to the right flank. Otherwise is unremarkable. Patient is overall feeling better after pain medications in the ED. He will be given an incentive spirometer, as well as pain medication and nausea medication for home. He is routine follow-up with his primary care physician within 2 to 3 days for reevaluation. He is to return to the ED for any new or worsening symptoms. Patient voiced understanding is agreeable with plan. Stable for discharge. My suspicion is low at this time for pneumonia, pleural effusion, pneumothorax, hemothorax, liver laceration, splenic laceration, retroperitoneal hemorrhage, or other emergent etiology    FINAL IMPRESSION      1. Closed fracture of one rib of right side, initial encounter          DISPOSITION/PLAN   DISPOSITION Decision To Discharge 01/11/2021 06:20:37 PM      PATIENT REFERREDTO:  OLGA Gardiner - CNP  [address] [place] [address]  [phone]    Schedule an appointment as soon as possible for a visit in 2 days      [facility] Emergency Department  [address] [place] [address]  [phone]    As needed, If symptoms worsen      DISCHARGE MEDICATIONS:  Discharge Medication List as of 1/11/2021  6:53 PM      START taking these medications    Details   HYDROcodone-acetaminophen (NORCO) 5-325 MG per tablet Take 1 tablet by mouth every 6 hours as needed for Pain for up to 3 days. , Disp-10 tablet, R-0Print      ondansetron (ZOFRAN ODT) 4 MG disintegrating tablet Take 1 tablet by mouth every 8 hours as needed for Nausea, Disp-20 tablet, R-0Print             DISCONTINUED MEDICATIONS:  Discharge Medication List as of 1/11/2021  6:53 PM                 (Please note that portions of this note were completed with a voice recognition program.  Efforts were made to edit the dictations but occasionally words are mis-transcribed.)    Shae Veloz PA-C (electronically signed)            Shae Veloz PA-C  01/12/21 0931

## 2021-01-12 LAB
EKG ATRIAL RATE: 71 BPM
EKG DIAGNOSIS: NORMAL
EKG P AXIS: 35 DEGREES
EKG P-R INTERVAL: 164 MS
EKG Q-T INTERVAL: 404 MS
EKG QRS DURATION: 76 MS
EKG QTC CALCULATION (BAZETT): 439 MS
EKG R AXIS: -1 DEGREES
EKG T AXIS: 11 DEGREES
EKG VENTRICULAR RATE: 71 BPM

## 2021-01-12 PROCEDURE — 93010 ELECTROCARDIOGRAM REPORT: CPT | Performed by: INTERNAL MEDICINE

## 2021-01-12 NOTE — ED NOTES
Report given to [facility], pt left this ER in stable condition.       Lauren Varghese RN  01/11/21 4461

## 2021-01-20 ENCOUNTER — HOSPITAL ENCOUNTER (OUTPATIENT)
Dept: VASCULAR LAB | Age: 76
Discharge: HOME | End: 2021-01-20
Payer: MEDICARE

## 2021-01-20 DIAGNOSIS — R60.0 BILATERAL LOWER EXTREMITY EDEMA: ICD-10-CM

## 2021-01-20 DIAGNOSIS — R09.89 DECREASED PULSES IN FEET: ICD-10-CM

## 2021-01-20 PROCEDURE — 93922 UPR/L XTREMITY ART 2 LEVELS: CPT

## 2021-04-02 NOTE — PROGRESS NOTES
daily 1 each 0    clopidogrel (PLAVIX) 75 MG tablet Take 1 tablet by mouth daily 90 tablet 1    pantoprazole (PROTONIX) 40 MG tablet Take 1 tablet by mouth daily 90 tablet 1    amLODIPine (NORVASC) 10 MG tablet TAKE 1 TABLET DAILY 90 tablet 1    simvastatin (ZOCOR) 20 MG tablet TAKE 1 TABLET NIGHTLY 90 tablet 1    lisinopril (PRINIVIL;ZESTRIL) 40 MG tablet TAKE 1 TABLET DAILY 90 tablet 1     Patient Active Problem List   Diagnosis    Hypertension    Cerebrovascular disease    DDD (degenerative disc disease), lumbosacral    Displacement of lumbar intervertebral disc without myelopathy    Hyperlipidemia    Carotid artery stenosis    Conductive hearing loss of both ears    Thrombocytopenia (HCC)    Acute kidney injury ([address]    Gastroesophageal reflux disease without esophagitis    Right hemiparesis (HCC)    Ataxia    Hemiparesis due to old lacunar stroke (HCC)    Vertigo, benign paroxysmal    CAD in native artery    Age-related osteoporosis without current pathological fracture    Hyperglycemia    Chest pain    Chronic pain of left upper extremity      Wt Readings from Last 3 Encounters:   04/06/21 188 lb (85.3 kg)   01/11/21 187 lb (84.8 kg)   01/04/21 187 lb (84.8 kg)     BP Readings from Last 3 Encounters:   04/06/21 110/78   01/11/21 (!) 144/80   01/04/21 112/70     Objective/Physical Exam:  /78   Pulse 60   Temp 97.3 °F (36.3 °C) (Temporal)   Ht 5' 6\" (1.676 m)   Wt 188 lb (85.3 kg)   BMI 30.34 kg/m²   Body mass index is 30.34 kg/m². Physical Exam  Vitals signs reviewed. Constitutional:       General: He is not in acute distress. Appearance: He is well-developed. He is not diaphoretic. HENT:      Head: Normocephalic and atraumatic. Eyes:      Pupils: Pupils are equal, round, and reactive to light. Cardiovascular:      Rate and Rhythm: Normal rate and regular rhythm. Pulmonary:      Effort: Pulmonary effort is normal.      Breath sounds: Normal breath sounds. Musculoskeletal:      Comments: In wheelchair   Skin:     General: Skin is warm and dry. Comments: Fungal appearing toenails on the right foot  Some redness and +1 edema noted to bilateral LE edema. Pt not wearing compression stockings. Neurological:      Mental Status: He is alert and oriented to person, place, and time. Assessment and Plan:  Junito Roberts was seen today for 3 month follow-up and hypertension. Diagnoses and all orders for this visit:    Bilateral lower extremity edema  -     Symptoms improved, but still intermittently present. Previously treated with ATB. Distal pulses intact bilaterally. - Reviewed scans.  - Some redness and +1 edema noted to bilateral LE edema. Pt not wearing compression stockings.  - Recommend continue compression stockings, elevation of LE when seated, decrease in salt intake and monitoring/calling if symptoms worsen/fail to improve. - Recommend vascular MD for evaluation. Pt agreeable. - Fungal appearing toenail on the right foot- has appt scheduled with podiatry. - Isaiah Raymond MD, Vascular Surgery, [facility]  - Red flag warning signs reviewed with the pt and he will go to the ER if these occur. Essential hypertension   - Stable. Asymptomatic. No signs of end-organ damage. Continue current regimen    Hyperlipidemia, unspecified hyperlipidemia type   - No myalgias. Continue current regimen     Osteoporosis without current pathological fracture, unspecified osteoporosis type  - Appears patient has not been prescribed this medication for years. Last PCP has a note stating to continue this medication. Patient reports that he is taking this weekly as he had extra leftover from previously. - Reviewed and patient states he would like to continue on this regimen. She would like a refill today. - Will fill for 3 months until patient sees PCP again. - alendronate (FOSAMAX) 70 MG tablet;  Take 1 tablet by mouth every 7 days-refilled today Return in about 3 months (around 7/6/2021) for routine f/u with PCP, or sooner if needed. Pt will call if symptoms worsen or fail to improve. All questions answered. Pt states no further questions or concerns at this time.    Electronically signed by: Lalita Friedman 04/06/21

## 2021-04-06 ENCOUNTER — OFFICE VISIT (OUTPATIENT)
Dept: INTERNAL MEDICINE CLINIC | Age: 76
End: 2021-04-06
Payer: MEDICARE

## 2021-04-06 VITALS
BODY MASS INDEX: 30.22 KG/M2 | HEART RATE: 60 BPM | DIASTOLIC BLOOD PRESSURE: 78 MMHG | TEMPERATURE: 97.3 F | WEIGHT: 188 LBS | HEIGHT: 66 IN | SYSTOLIC BLOOD PRESSURE: 110 MMHG

## 2021-04-06 DIAGNOSIS — I10 ESSENTIAL HYPERTENSION: ICD-10-CM

## 2021-04-06 DIAGNOSIS — E78.5 HYPERLIPIDEMIA, UNSPECIFIED HYPERLIPIDEMIA TYPE: ICD-10-CM

## 2021-04-06 DIAGNOSIS — R60.0 BILATERAL LOWER EXTREMITY EDEMA: Primary | ICD-10-CM

## 2021-04-06 DIAGNOSIS — M81.0 OSTEOPOROSIS WITHOUT CURRENT PATHOLOGICAL FRACTURE, UNSPECIFIED OSTEOPOROSIS TYPE: ICD-10-CM

## 2021-04-06 PROCEDURE — 99214 OFFICE O/P EST MOD 30 MIN: CPT | Performed by: NURSE PRACTITIONER

## 2021-04-06 RX ORDER — ALENDRONATE SODIUM 70 MG/1
70 TABLET ORAL
Qty: 12 TABLET | Refills: 0 | Status: SHIPPED | OUTPATIENT
Start: 2021-04-06 | End: 2021-06-22

## 2021-04-06 SDOH — ECONOMIC STABILITY: TRANSPORTATION INSECURITY
IN THE PAST 12 MONTHS, HAS LACK OF TRANSPORTATION KEPT YOU FROM MEETINGS, WORK, OR FROM GETTING THINGS NEEDED FOR DAILY LIVING?: NO

## 2021-04-06 ASSESSMENT — ENCOUNTER SYMPTOMS
COUGH: 0
WHEEZING: 0
SHORTNESS OF BREATH: 0

## 2021-06-07 ENCOUNTER — OFFICE VISIT (OUTPATIENT)
Dept: VASCULAR SURGERY | Age: 76
End: 2021-06-07
Payer: MEDICARE

## 2021-06-07 VITALS
HEIGHT: 66 IN | DIASTOLIC BLOOD PRESSURE: 82 MMHG | BODY MASS INDEX: 29.25 KG/M2 | WEIGHT: 182 LBS | SYSTOLIC BLOOD PRESSURE: 142 MMHG

## 2021-06-07 DIAGNOSIS — I87.2 VENOUS INSUFFICIENCY (CHRONIC) (PERIPHERAL): Primary | ICD-10-CM

## 2021-06-07 PROCEDURE — 99203 OFFICE O/P NEW LOW 30 MIN: CPT | Performed by: SURGERY

## 2021-06-07 NOTE — PROGRESS NOTES
[facility]  Consultation Note    Chief Complaint / Reason for Consultation  Lower extremity edema. History of Present Illness  Patient is a 67 y.o. male history of hypertension, hyperlipidemia and cerebrovascular disease with previous CVA. Referred for lower extremity swelling. He states he wears compression 2 days per week. He is unable to walk since a stroke in 2007. Review of Systems     Denies fevers, chills, chest pain, shortness of breath, nausea, vomiting, hematemesis, diarrhea, constipation, melena, hematochezia, wt changes, vision problems, blindness, hearing problems, facial droop, slurred speech, extremity weakness, extremity numbness, dysuria. Past Medical History:   Diagnosis Date    Cerebrovascular disease 2007    Right leg weakness    Hyperlipidemia     Hypertension     Osteoarthritis     Risk for falls 06/09/2017    YO 18/55    Unspecified cerebral artery occlusion with cerebral infarction        Past Surgical History:   Procedure Laterality Date    HERNIA REPAIR      NOSE SURGERY         No Known Allergies    Social History     Socioeconomic History    Marital status:      Spouse name: Not on file    Number of children: Not on file    Years of education: Not on file    Highest education level: Not on file   Occupational History    Occupation: Retired   Tobacco Use    Smoking status: Never Smoker    Smokeless tobacco: Never Used   Vaping Use    Vaping Use: Never used   Substance and Sexual Activity    Alcohol use: No     Alcohol/week: 0.0 standard drinks    Drug use: No    Sexual activity: Not Currently   Other Topics Concern    Not on file   Social History Narrative    Lives at home. Wife in nursing home.      Social Determinants of Health     Financial Resource Strain: Low Risk     Difficulty of Paying Living Expenses: Not hard at all   Food Insecurity: No Food Insecurity    Worried About [address] [employer] in the Last Year: Never true    Ran Out of Food in the Last Year: Never true   Transportation Needs: No Transportation Needs    Lack of Transportation (Medical): No    Lack of Transportation (Non-Medical): No   Physical Activity:     Days of Exercise per Week:     Minutes of Exercise per Session:    Stress:     Feeling of Stress :    Social Connections:     Frequency of Communication with Friends and Family:     Frequency of Social Gatherings with Friends and Family:     Attends [employer] Services:     Active Member of Clubs or Organizations:     Attends Club or Organization Meetings:     Marital Status:    Intimate Partner Violence:     Fear of Current or Ex-Partner:     Emotionally Abused:     Physically Abused:     Sexually Abused:        Family History   Problem Relation Age of Onset    Heart Disease Father 64    Diabetes Mother     Heart Disease Mother     High Blood Pressure Mother     Breast Cancer Maternal Grandmother     Breast Cancer Sister      - No history of bleeding or clotting disorders    Vital Signs  Vitals:    06/07/21 1102   BP: (!) 142/82   Weight: 182 lb (82.6 kg)   Height: 5' 6\" (1.676 m)       Physical Examination  General:  no apparent distress  Psychiatric: affect appropriate  Extremities: marked stasis changes  Palpable distal pulses  No ulceration  1+ edema    Labs  Lab Results   Component Value Date    WBC 4.9 01/11/2021    HGB 16.4 01/11/2021    HCT 50.1 01/11/2021    MCV 93.8 01/11/2021     01/11/2021     Lab Results   Component Value Date     01/11/2021    K 3.8 01/11/2021    K 3.8 04/07/2019     01/11/2021    CO2 20 01/11/2021    BUN 17 01/11/2021    CREATININE 1.1 01/11/2021      No components found for: GLU    Imaging:        Right Impression   Right CHATA: 1.24. This is consistent with no significant PAD at rest.   Right first toe/brachial index 0.85: This is within normal range .    Continuous wave Doppler of the right PTA, DPA is normal .   Right pulse volume

## 2021-06-22 DIAGNOSIS — M81.0 OSTEOPOROSIS WITHOUT CURRENT PATHOLOGICAL FRACTURE, UNSPECIFIED OSTEOPOROSIS TYPE: ICD-10-CM

## 2021-06-22 RX ORDER — ALENDRONATE SODIUM 70 MG/1
TABLET ORAL
Qty: 12 TABLET | Refills: 1 | Status: SHIPPED | OUTPATIENT
Start: 2021-06-22 | End: 2021-11-09

## 2021-06-22 NOTE — PROGRESS NOTES
[facility]   Cardiac Consultation    Referring Provider:  Aurelio Santacruz MD     Chief Complaint   Patient presents with   [address] [place] [address] Cardiologist     referred by Guille Espinoza for chest pains     Chest Pain     states last time he had chest pains was on April 14th        History of Present Illness:  Mr Lawrence Hardin is here today as a new consult. He has a history of CAD, hypertension, hyperlipidemia, Cerebral vascular disease. He had a stroke in 2007, he is weak on his right side. He was recently in the ER on 4/7/19 with chest pain. Where he was not admitted. A few weeks ago (4/7/19)  He developed sharp pains in chest, and tightness  he was with friends from [employer] who encouraged him to call an ambulance. He was then taken to the ER. He was not admitted at that time. He states he has had a few episodes of chest pains prior to this. Stress test done in Feb. 2019 was negative. He is in a wheelchair today, walks with a walker at home. He lives alone. Denies chest pains with walking. Has has some stress at [employer] lately. His sister Karla Ross is with him for the visit. Past Medical History:   has a past medical history of Cerebrovascular disease, Hyperlipidemia, Hypertension, Risk for falls, and Unspecified cerebral artery occlusion with cerebral infarction. Surgical History:   has a past surgical history that includes hernia repair and Nose surgery. Social History:   reports that he has never smoked. He has never used smokeless tobacco. He reports that he does not drink alcohol or use drugs. Family History:  family history includes Heart Disease (age of onset: 64) in his father. Home Medications:  Prior to Admission medications    Medication Sig Start Date End Date Taking?  Authorizing Provider   nitroGLYCERIN (NITROSTAT) 0.4 MG SL tablet Place 1 tablet under the tongue every 5 minutes as needed for Chest pain 4/25/19  Yes Sophie Simental MD   simvastatin (ZOCOR) 20 MG tablet ENT Focus Note:    Returned to patient bedside at 11:30 for decision re: PE tubes. Patient continues to decline. She states she would rather do medications. She states she was fine when on afrin though EMR notes otherwise. Unfortunately, medications were ineffective for her - after 3 days of afrin and pseudophedrine EMR notes that patient had pain with descent and ascent with bilateral fluid noted behind TM. Patient was prescribed fluticasone but has refused all administrations of this. ENT still recommending bilateral PE tube placement to enable ongoing HBO and this was discussed. Patient continues to decline procedure.     No further recommendations.  ENT will sign off.    Gloria Faith PA-C     TAKE 1 TABLET NIGHTLY 4/4/19  Yes Verna Schreiber MD   pantoprazole (PROTONIX) 40 MG tablet Take 1 tablet by mouth daily 4/4/19  Yes Verna Schreiber MD   alendronate [facility]) 70 MG tablet Take 1 tablet by mouth every 7 days 4/4/19  Yes Verna Schreiber MD   amLODIPine [facility]) 10 MG tablet TAKE 1 TABLET DAILY 3/21/19  Yes Verna Schreiber MD   lisinopril (PRINIVIL;ZESTRIL) 40 MG tablet TAKE 1 TABLET DAILY 1/25/19  Yes Verna Schreiber MD   clopidogrel (PLAVIX) 75 MG tablet Take 1 tablet by mouth daily 8/27/18  Yes Verna Schreiber MD   clotrimazole-betamethasone (LOTRISONE) 1-0.05 % cream APPLY TOPICALLY TWICE DAILY 12/14/17  Yes Verna Schreiber MD   zoster recombinant adjuvanted vaccine [facility]) 50 MCG/0.5ML SUSR injection 0.5mL IM x 1. Followed by 0.5mL IM 2-6 months after dose #1. 1/24/19   Verna Schreiber MD   docusate sodium (COLACE) 50 MG capsule Take 1 capsule by mouth daily 8/30/18   Verna Schreiber MD        Allergies:  Patient has no known allergies. Review of Systems:   · Constitutional: there has been no unanticipated weight loss. There's been no change in energy level, sleep pattern, or activity level. · Eyes: No visual changes or diplopia. No scleral icterus. · ENT: No Headaches, hearing loss or vertigo. No mouth sores or sore throat. · Cardiovascular: Reviewed in HPI  · Respiratory: No cough or wheezing, no sputum production. No hematemesis. · Gastrointestinal: No abdominal pain, appetite loss, blood in stools. No change in bowel or bladder habits. · Genitourinary: No dysuria, trouble voiding, or hematuria. · Musculoskeletal:  Right side weakness, uses walker   · Integumentary: No rash or pruritis. · Neurological: No headache, diplopia, change in muscle strength, numbness or tingling. No change in gait, balance, coordination, mood, affect, memory, mentation, behavior.   · Psychiatric: No anxiety, no Azul Myoview    Normal LV size and systolic function.    Left ventricular ejection fraction of 71 %.    There is normal isotope uptake at stress and rest.    There is no evidence of myocardial ischemia or scar. Assessment:    Chest Pain   Non exertional, no shortness of breath   Script for Nitroglycerin SL tablet 0.4mg    Feb 2019 stress negative     Carotid Artery Stenosis   VL Carotid 1/17/17    There is 50-79% stenosis of the right internal carotid artery.    There is 16-49% stenosis of the left internal carotid artery. Hypertension, stable   Blood pressure 118/70, pulse 68, height 5' 6\" (1.676 m), weight 175 lb 1.9 oz (79.4 kg). Hyperlipidemia   Zocor 20mg   1/29/19> , , LDL 56, HDL 39          Plan:    Agatha Farley has a stable cardiac status. Chest symptoms seem atypical for angina. Stress test normal  1. Nitroglycerin SL tablet 0.4mg     2. Call for increasing episodes of chest pain, increasing shortness of breath   3. Continue risk factor modifications. 4. Return for regular follow up in 6 months. I appreciate the opportunity of cooperating in the care of this individual.    Maryan Goodwin. Juana Mendez M.D., [address] attestation: This note was scribed in the presence of Dr. Juana Mendez MD, by Moniqeu Graham RN. The scribe's documentation has been prepared under my direction and personally reviewed by me in its entirety. I confirm that the note above accurately reflects all work, treatment, procedures, and medical decision making performed by me.

## 2021-07-08 ENCOUNTER — HOSPITAL ENCOUNTER (EMERGENCY)
Age: 76
Discharge: HOME | End: 2021-07-09
Payer: MEDICARE

## 2021-07-08 ENCOUNTER — APPOINTMENT (OUTPATIENT)
Dept: CT IMAGING | Age: 76
End: 2021-07-08
Payer: MEDICARE

## 2021-07-08 DIAGNOSIS — S00.03XA CONTUSION OF SCALP, INITIAL ENCOUNTER: ICD-10-CM
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
## 2021-07-08 PROCEDURE — 70450 CT HEAD/BRAIN W/O DYE: CPT

## 2021-07-08 PROCEDURE — 99284 EMERGENCY DEPT VISIT MOD MDM: CPT

## 2021-07-08 PROCEDURE — 72125 CT NECK SPINE W/O DYE: CPT

## 2021-07-08 RX ORDER — SENNA PLUS 8.6 MG/1
2 TABLET ORAL DAILY
COMMUNITY

## 2021-07-08 ASSESSMENT — ENCOUNTER SYMPTOMS
SHORTNESS OF BREATH: 0
RHINORRHEA: 0
NAUSEA: 0
DIARRHEA: 0
COUGH: 0
WHEEZING: 0
VOMITING: 0
ABDOMINAL PAIN: 0

## 2021-07-08 ASSESSMENT — PAIN DESCRIPTION - LOCATION: LOCATION: HEAD

## 2021-07-08 ASSESSMENT — PAIN DESCRIPTION - PAIN TYPE: TYPE: ACUTE PAIN

## 2021-07-08 ASSESSMENT — PAIN SCALES - GENERAL: PAINLEVEL_OUTOF10: 2

## 2021-07-08 NOTE — ED NOTES
Bed: 20  Expected date:   Expected time:   Means of arrival:   Comments:  [facility] [address] [facility]  07/08/21 3284

## 2021-07-08 NOTE — ED PROVIDER NOTES
[facility]        Pt Name: Jayna Horner  MRN: [id number]  [place] 1945  Date of evaluation: 7/8/2021  Provider: Chandan Dillard PA-C  PCP: OLGA Christiansen CNP  Note Started: 5:44 PM EDT       LATANYA. I have evaluated this patient. My supervising physician was available for consultation. CHIEF COMPLAINT       No chief complaint on file. HISTORY OF PRESENT ILLNESS   (Location, Timing/Onset, Context/Setting, Quality, Duration, Modifying Factors, Severity, Associated Signs and Symptoms)  Note limiting factors. Chief Complaint: Head injury     Jayna Horner is a 67 y.o. male who presents for evaluation after mechanical fall that occurred just prior to arrival in the ED. Patient states that he was going to sit in his wheelchair when he lost his balance, fell and hit the right side of his head against the tile wall. He denies any loss of consciousness. States that his tetanus is up-to-date. He denies any numbness tingling or weakness distally. He has right-sided deficits from prior CVA. He denies any neck or back pain. He denies any preceding symptoms such as dizziness/lightheadedness, weakness or visual disturbances. No chest pain or shortness of breath. He has no other injuries or complaints at this time. Nursing Notes were all reviewed and agreed with or any disagreements were addressed in the HPI. REVIEW OF SYSTEMS    (2-9 systems for level 4, 10 or more for level 5)     Review of Systems   Constitutional: Negative for appetite change, chills and fever. HENT: Negative for congestion and rhinorrhea. Respiratory: Negative for cough, shortness of breath and wheezing. Cardiovascular: Negative for chest pain. Gastrointestinal: Negative for abdominal pain, diarrhea, nausea and vomiting. Genitourinary: Negative for difficulty urinating, dysuria and hematuria.    Musculoskeletal: Negative for neck pain and neck stiffness. Skin: Negative for rash. Neurological: Positive for headaches. Negative for dizziness, syncope, weakness and light-headedness. Positives and Pertinent negatives as per HPI. Except as noted above in the ROS, all other systems were reviewed and negative. PAST MEDICAL HISTORY     Past Medical History:   Diagnosis Date    Cerebrovascular disease 2007    Right leg weakness    Hyperlipidemia     Hypertension     Osteoarthritis     Risk for falls 06/09/2017    YO 17[birth year]55    Unspecified cerebral artery occlusion with cerebral infarction          SURGICAL HISTORY     Past Surgical History:   Procedure Laterality Date    HERNIA REPAIR      NOSE SURGERY           CURRENTMEDICATIONS       Previous Medications    ALENDRONATE (FOSAMAX) 70 MG TABLET    TAKE 1 TABLET EVERY 7 DAYS    AMLODIPINE (NORVASC) 10 MG TABLET    TAKE 1 TABLET DAILY    CLOPIDOGREL (PLAVIX) 75 MG TABLET    Take 1 tablet by mouth daily    CLOTRIMAZOLE-BETAMETHASONE (LOTRISONE) 1-0.05 % CREAM    APPLY TOPICALLY TWICE DAILY    COMPRESSION STOCKINGS MISC    Compression for BLE daily    CYANOCOBALAMIN 1000 MCG TABLET    Take 1,000 mcg by mouth daily    LISINOPRIL (PRINIVIL;ZESTRIL) 40 MG TABLET    TAKE 1 TABLET DAILY    PANTOPRAZOLE (PROTONIX) 40 MG TABLET    Take 1 tablet by mouth daily    SENNA (SENOKOT) 8.6 MG TABLET    Take 2 tablets by mouth daily    SIMVASTATIN (ZOCOR) 20 MG TABLET    TAKE 1 TABLET NIGHTLY         ALLERGIES     Patient has no known allergies. FAMILYHISTORY       Family History   Problem Relation Age of Onset    Heart Disease Father 64    Diabetes Mother     Heart Disease Mother     High Blood Pressure Mother     Breast Cancer Maternal Grandmother     Breast Cancer Sister           SOCIAL HISTORY       Social History     Tobacco Use    Smoking status: Never Smoker    Smokeless tobacco: Never Used   Vaping Use    Vaping Use: Never used   Substance Use Topics    Alcohol use:  No by nursing staff. He will be reevaluated. CT the head shows no acute intracranial abnormality. CT cervical spine is unremarkable. Symptomatic, supportive care was discussed. I estimate there is LOW risk for SKULL FRACTURE, INTRACRANIAL HEMORRHAGE, CERVICAL SPINE INJURY, SUBDURAL OR EPIDURAL HEMATOMA,  thus I consider the discharge disposition reasonable. The history and physical exam suggests a soft tissue source for pain such as muscles, tendons, nerve irritation, etc. I estimate there is LOW risk for CAUDA EQUINA or CENTRAL CORD SYNDROME, EPIDURAL MASS LESION OR ABSCESS, ARTERIAL DISSECTION, MENINGITIS, FRACTURE, CORD COMPRESSION, OR SEVERE SPINAL STENOSIS,  thus I consider the discharge disposition reasonable. Patient is advised to follow-up with their family doctor within the next 24-48 hours and return to the emergency department with any concerns. He is agreeable to this plan and stable for discharge at this time      FINAL IMPRESSION      1. Fall from wheelchair, initial encounter    2.  Contusion of scalp, initial encounter          DISPOSITION/PLAN   DISPOSITION Decision To Discharge 07/08/2021 07:02:18 PM      PATIENT REFERRED TO:  OLGA Baer CNP  [facility]  [phone]    Call   For a re-check in 2-3   days    [facility] Emergency Department  [place] [address]  [phone]  Go to   If symptoms worsen      DISCHARGE MEDICATIONS:  New Prescriptions    No medications on file       DISCONTINUED MEDICATIONS:  Discontinued Medications    No medications on file              (Please note that portions of this note were completed with a voice recognition program.  Efforts were made to edit the dictations but occasionally words are mis-transcribed.)    Matilde Benoit PA-C (electronically signed)           Gideon Meigs, [place]  07/08/21 3275

## 2021-07-09 VITALS
HEART RATE: 80 BPM | WEIGHT: 178 LBS | DIASTOLIC BLOOD PRESSURE: 110 MMHG | TEMPERATURE: 97.9 F | BODY MASS INDEX: 28.61 KG/M2 | HEIGHT: 66 IN | RESPIRATION RATE: 16 BRPM | SYSTOLIC BLOOD PRESSURE: 160 MMHG | OXYGEN SATURATION: 95 %

## 2021-07-09 NOTE — ED NOTES
PT bed linens changed, PT pants, brief changed. PT provided corina care, repositioned.      Rufino Oconnor RN  07/09/21 8947

## 2021-07-09 NOTE — ED NOTES
PT report given to [facility] at this time, they state no further questions or concerns.      Lanny Walters RN  07/09/21 8127

## 2021-07-12 ENCOUNTER — OFFICE VISIT (OUTPATIENT)
Dept: INTERNAL MEDICINE CLINIC | Age: 76
End: 2021-07-12
Payer: MEDICARE

## 2021-07-12 VITALS
HEART RATE: 56 BPM | DIASTOLIC BLOOD PRESSURE: 78 MMHG | BODY MASS INDEX: 28.73 KG/M2 | WEIGHT: 178 LBS | SYSTOLIC BLOOD PRESSURE: 138 MMHG | OXYGEN SATURATION: 97 %

## 2021-07-12 DIAGNOSIS — M81.0 AGE-RELATED OSTEOPOROSIS WITHOUT CURRENT PATHOLOGICAL FRACTURE: ICD-10-CM

## 2021-07-12 DIAGNOSIS — I10 ESSENTIAL HYPERTENSION: Primary | ICD-10-CM
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
## 2021-07-12 DIAGNOSIS — E78.5 HYPERLIPIDEMIA, UNSPECIFIED HYPERLIPIDEMIA TYPE: Chronic | ICD-10-CM

## 2021-07-12 DIAGNOSIS — I65.23 BILATERAL CAROTID ARTERY STENOSIS: Chronic | ICD-10-CM

## 2021-07-12 DIAGNOSIS — G81.91 RIGHT HEMIPARESIS (HCC): ICD-10-CM

## 2021-07-12 DIAGNOSIS — D69.6 THROMBOCYTOPENIA (HCC): ICD-10-CM

## 2021-07-12 DIAGNOSIS — I25.10 CAD IN NATIVE ARTERY: ICD-10-CM

## 2021-07-12 DIAGNOSIS — I69.359 HEMIPARESIS DUE TO OLD LACUNAR STROKE (HCC): ICD-10-CM

## 2021-07-12 DIAGNOSIS — R27.0 ATAXIA: ICD-10-CM

## 2021-07-12 PROCEDURE — 99214 OFFICE O/P EST MOD 30 MIN: CPT | Performed by: NURSE PRACTITIONER

## 2021-07-12 NOTE — PROGRESS NOTES
7/12/21     Chief Complaint   Patient presents with    Hypertension     3 month f/u    ED Follow-up     fell in his house, went to [facility]      HPI     Patient is here for 3-month follow-up of HTN, HLD, history of CVA with right hemiparesis, ataxia, carotid stenosis -  He reports good BP control at home and he reports compliance of medications. Pt had a mechanical fall in his house while reaching for his w/c. hit his right side of his head against the tile wall on 7/8 - went to [facility] ED - he used his life alert. Having some mild back pain - which is improving day to day   Had some headaches which are becoming less intense and less frequenct since onset. Pt denies any dizziness, CP, palpitations, dyspnea. CT head and CT cervical spine - WNL     No Known Allergies    Current Outpatient Medications   Medication Sig Dispense Refill    cyanocobalamin 1000 MCG tablet Take 1,000 mcg by mouth daily      senna (SENOKOT) 8.6 MG tablet Take 2 tablets by mouth daily      alendronate (FOSAMAX) 70 MG tablet TAKE 1 TABLET EVERY 7 DAYS 12 tablet 1    clotrimazole-betamethasone (LOTRISONE) 1-0.05 % cream APPLY TOPICALLY TWICE DAILY 45 g 0    Compression Stockings MISC Compression for BLE daily 1 each 0    clopidogrel (PLAVIX) 75 MG tablet Take 1 tablet by mouth daily 90 tablet 1    pantoprazole (PROTONIX) 40 MG tablet Take 1 tablet by mouth daily 90 tablet 1    amLODIPine (NORVASC) 10 MG tablet TAKE 1 TABLET DAILY 90 tablet 1    simvastatin (ZOCOR) 20 MG tablet TAKE 1 TABLET NIGHTLY 90 tablet 1    lisinopril (PRINIVIL;ZESTRIL) 40 MG tablet TAKE 1 TABLET DAILY 90 tablet 1     No current facility-administered medications for this visit. Review of Systems  Negative other than HPI     Vitals:    07/12/21 1036   BP: 138/78   Pulse: 56   SpO2: 97%   Weight: 178 lb (80.7 kg)      Physical Exam  Constitutional:       General: He is not in acute distress. HENT:      Head: Normocephalic and atraumatic. Cardiovascular:      Rate and Rhythm: Regular rhythm. Heart sounds: Normal heart sounds. Pulmonary:      Effort: Pulmonary effort is normal. No respiratory distress. Breath sounds: Normal breath sounds. Musculoskeletal:      Comments: Trace BLE edema    Skin:     Findings: No erythema. Neurological:      Mental Status: He is alert and oriented to person, place, and time. Mental status is at baseline. Motor: Weakness present. Gait: Gait abnormal.      Comments: In w/c   Psychiatric:         Mood and Affect: Mood normal.         Behavior: Behavior normal.       Assessment/Plan:   Essential hypertension  Stable, controlled on current regimen. Fall, subsequent encounter/ Right hemiparesis (HCC)/Hemiparesis due to old lacunar stroke (HCC)/Ataxia  Uncontrolled  Discussed PT - declined today     Thrombocytopenia ([facility] [address]  Recent labs stable     Bilateral carotid artery stenosis/ Hyperlipidemia, unspecified hyperlipidemia type/ CAD in native artery  Stable, controlled on current regimen. Age-related osteoporosis without current pathological fracture  Stable, controlled on current regimen. Continue alendronate     Discussed medications with patient, who voiced understanding of their use and indications. All questions answered.     Follow up in 6 months and prn     Electronically signed by OLGA Cedeno CNP on 7/12/2021 at 11:31 AM

## 2021-08-16 ENCOUNTER — OFFICE VISIT (OUTPATIENT)
Dept: INTERNAL MEDICINE CLINIC | Age: 76
End: 2021-08-16
Payer: MEDICARE

## 2021-08-16 VITALS — SYSTOLIC BLOOD PRESSURE: 138 MMHG | DIASTOLIC BLOOD PRESSURE: 84 MMHG | HEART RATE: 68 BPM

## 2021-08-16 DIAGNOSIS — I73.9 PVD (PERIPHERAL VASCULAR DISEASE) (HCC): ICD-10-CM

## 2021-08-16 DIAGNOSIS — M79.89 LEG SWELLING: ICD-10-CM

## 2021-08-16 DIAGNOSIS — R60.9 VENOUS STASIS ULCER OF RIGHT LOWER LEG WITH EDEMA OF RIGHT LOWER LEG (HCC): ICD-10-CM

## 2021-08-16 DIAGNOSIS — I83.891 VENOUS STASIS ULCER OF RIGHT LOWER LEG WITH EDEMA OF RIGHT LOWER LEG (HCC): ICD-10-CM

## 2021-08-16 DIAGNOSIS — L03.115 CELLULITIS OF RIGHT LOWER EXTREMITY: Primary | ICD-10-CM

## 2021-08-16 DIAGNOSIS — I87.2 VENOUS INSUFFICIENCY OF BOTH LOWER EXTREMITIES: ICD-10-CM

## 2021-08-16 DIAGNOSIS — L97.919 VENOUS STASIS ULCER OF RIGHT LOWER LEG WITH EDEMA OF RIGHT LOWER LEG (HCC): ICD-10-CM

## 2021-08-16 DIAGNOSIS — R60.0 LEG EDEMA, RIGHT: ICD-10-CM

## 2021-08-16 DIAGNOSIS — I83.019 VENOUS STASIS ULCER OF RIGHT LOWER LEG WITH EDEMA OF RIGHT LOWER LEG (HCC): ICD-10-CM

## 2021-08-16 PROCEDURE — 99214 OFFICE O/P EST MOD 30 MIN: CPT | Performed by: NURSE PRACTITIONER

## 2021-08-16 RX ORDER — CEPHALEXIN 500 MG/1
500 CAPSULE ORAL 2 TIMES DAILY
Qty: 14 CAPSULE | Refills: 0 | Status: SHIPPED | OUTPATIENT
Start: 2021-08-16 | End: 2021-08-23

## 2021-08-16 RX ORDER — DOXYCYCLINE HYCLATE 100 MG
100 TABLET ORAL 2 TIMES DAILY
Qty: 14 TABLET | Refills: 0 | Status: SHIPPED | OUTPATIENT
Start: 2021-08-16 | End: 2021-08-18

## 2021-08-16 NOTE — PROGRESS NOTES
8/16/21     Chief Complaint   Patient presents with    Edema     with redness and swelling. HPI     Chronic weakness - refuses PT   Had a fall about 3 weeks ago   Today is here with redness, swelling and blisters to RLE - started [employer]"  Denies any pain to lower extremities   Not worsening or improving - unchanged since onset   Using a cream without relief. Using a roll on without relief   Chronic bilateral PVD - reports discoloration to LLE is unchanged  Not wearing compression - bc aid told him not to wear them       No Known Allergies    Current Outpatient Medications   Medication Sig Dispense Refill    doxycycline hyclate (VIBRA-TABS) 100 MG tablet Take 1 tablet by mouth 2 times daily for 7 days 14 tablet 0    cephALEXin (KEFLEX) 500 MG capsule Take 1 capsule by mouth 2 times daily for 7 days 14 capsule 0    cyanocobalamin 1000 MCG tablet Take 1,000 mcg by mouth daily      senna (SENOKOT) 8.6 MG tablet Take 2 tablets by mouth daily      alendronate (FOSAMAX) 70 MG tablet TAKE 1 TABLET EVERY 7 DAYS 12 tablet 1    clotrimazole-betamethasone (LOTRISONE) 1-0.05 % cream APPLY TOPICALLY TWICE DAILY 45 g 0    Compression Stockings MISC Compression for BLE daily 1 each 0    clopidogrel (PLAVIX) 75 MG tablet Take 1 tablet by mouth daily 90 tablet 1    pantoprazole (PROTONIX) 40 MG tablet Take 1 tablet by mouth daily 90 tablet 1    amLODIPine (NORVASC) 10 MG tablet TAKE 1 TABLET DAILY 90 tablet 1    simvastatin (ZOCOR) 20 MG tablet TAKE 1 TABLET NIGHTLY 90 tablet 1    lisinopril (PRINIVIL;ZESTRIL) 40 MG tablet TAKE 1 TABLET DAILY 90 tablet 1     No current facility-administered medications for this visit. Review of Systems   Negative other than HPI     Vitals:    08/16/21 1037 08/16/21 1043 08/16/21 1110   BP: (!) 152/90 (!) 140/88 138/84   Pulse: 68     Weight: Comment: wheelchair        Physical Exam  Constitutional:       General: He is not in acute distress.      Appearance: Normal

## 2021-08-17 ENCOUNTER — TELEPHONE (OUTPATIENT)
Dept: INTERNAL MEDICINE CLINIC | Age: 76
End: 2021-08-17

## 2021-08-18 ENCOUNTER — TELEPHONE (OUTPATIENT)
Dept: INTERNAL MEDICINE CLINIC | Age: 76
End: 2021-08-18

## 2021-08-18 RX ORDER — SULFAMETHOXAZOLE AND TRIMETHOPRIM 800; 160 MG/1; MG/1
1 TABLET ORAL 2 TIMES DAILY
Qty: 14 TABLET | Refills: 0 | Status: SHIPPED | OUTPATIENT
Start: 2021-08-18 | End: 2021-08-25

## 2021-08-18 NOTE — TELEPHONE ENCOUNTER
Bal Pal with central scheduling calling in regards to a stat order for a venous doppler for the patient. Patient is scheduled to have it done on 9/22 due to having transportation issues and being in a wheelchair. Bal Pal wanted to advise Red Morgan of the appointment since the order was showing as stat. No call back is needed.

## 2021-09-22 ENCOUNTER — HOSPITAL ENCOUNTER (OUTPATIENT)
Dept: VASCULAR LAB | Age: 76
Discharge: HOME | End: 2021-09-22
Payer: MEDICARE

## 2021-09-22 DIAGNOSIS — I73.9 PVD (PERIPHERAL VASCULAR DISEASE) (HCC): ICD-10-CM

## 2021-09-22 DIAGNOSIS — R60.9 VENOUS STASIS ULCER OF RIGHT LOWER LEG WITH EDEMA OF RIGHT LOWER LEG (HCC): ICD-10-CM

## 2021-09-22 DIAGNOSIS — L97.919 VENOUS STASIS ULCER OF RIGHT LOWER LEG WITH EDEMA OF RIGHT LOWER LEG (HCC): ICD-10-CM

## 2021-09-22 DIAGNOSIS — I83.019 VENOUS STASIS ULCER OF RIGHT LOWER LEG WITH EDEMA OF RIGHT LOWER LEG (HCC): ICD-10-CM

## 2021-09-22 DIAGNOSIS — M79.89 LEG SWELLING: ICD-10-CM

## 2021-09-22 DIAGNOSIS — I83.891 VENOUS STASIS ULCER OF RIGHT LOWER LEG WITH EDEMA OF RIGHT LOWER LEG (HCC): ICD-10-CM

## 2021-09-22 DIAGNOSIS — I87.2 VENOUS INSUFFICIENCY OF BOTH LOWER EXTREMITIES: ICD-10-CM

## 2021-09-22 DIAGNOSIS — R60.0 LEG EDEMA, RIGHT: ICD-10-CM

## 2021-09-22 PROCEDURE — 93970 EXTREMITY STUDY: CPT

## 2021-09-25 DIAGNOSIS — I67.9 CEREBROVASCULAR DISEASE: ICD-10-CM

## 2021-09-27 RX ORDER — CLOPIDOGREL BISULFATE 75 MG/1
TABLET ORAL
Qty: 90 TABLET | Refills: 3 | Status: SHIPPED | OUTPATIENT
Start: 2021-09-27

## 2021-09-30 ENCOUNTER — TELEPHONE (OUTPATIENT)
Dept: INTERNAL MEDICINE CLINIC | Age: 76
End: 2021-09-30

## 2021-09-30 DIAGNOSIS — I67.9 CEREBROVASCULAR DISEASE: ICD-10-CM

## 2021-09-30 DIAGNOSIS — K21.9 GASTROESOPHAGEAL REFLUX DISEASE WITHOUT ESOPHAGITIS: ICD-10-CM

## 2021-09-30 RX ORDER — SIMVASTATIN 20 MG
TABLET ORAL
Qty: 90 TABLET | Refills: 1 | Status: SHIPPED | OUTPATIENT
Start: 2021-09-30

## 2021-09-30 RX ORDER — PANTOPRAZOLE SODIUM 40 MG/1
40 TABLET, DELAYED RELEASE ORAL DAILY
Qty: 90 TABLET | Refills: 1 | Status: SHIPPED | OUTPATIENT
Start: 2021-09-30

## 2021-10-05 ENCOUNTER — TELEPHONE (OUTPATIENT)
Dept: INTERNAL MEDICINE CLINIC | Age: 76
End: 2021-10-05

## 2021-11-09 DIAGNOSIS — M81.0 OSTEOPOROSIS WITHOUT CURRENT PATHOLOGICAL FRACTURE, UNSPECIFIED OSTEOPOROSIS TYPE: ICD-10-CM

## 2021-11-09 RX ORDER — ALENDRONATE SODIUM 70 MG/1
TABLET ORAL
Qty: 12 TABLET | Refills: 1 | Status: SHIPPED | OUTPATIENT
Start: 2021-11-09

## 2021-12-08 ENCOUNTER — APPOINTMENT (OUTPATIENT)
Dept: GENERAL RADIOLOGY | Age: 76
DRG: 065 | End: 2021-12-08
Payer: MEDICARE

## 2021-12-08 ENCOUNTER — APPOINTMENT (OUTPATIENT)
Dept: CT IMAGING | Age: 76
DRG: 065 | End: 2021-12-08
Payer: MEDICARE

## 2021-12-08 ENCOUNTER — APPOINTMENT (OUTPATIENT)
Dept: MRI IMAGING | Age: 76
DRG: 065 | End: 2021-12-08
Payer: MEDICARE

## 2021-12-08 ENCOUNTER — HOSPITAL ENCOUNTER (INPATIENT)
Age: 76
LOS: 6 days | Discharge: HOSPICE | DRG: 065 | End: 2021-12-14
Attending: EMERGENCY MEDICINE | Admitting: INTERNAL MEDICINE
Payer: MEDICARE

## 2021-12-08 DIAGNOSIS — R77.8 TROPONIN LEVEL ELEVATED: ICD-10-CM

## 2021-12-08 DIAGNOSIS — R47.01 APHASIA: Primary | ICD-10-CM

## 2021-12-08 DIAGNOSIS — E87.6 HYPOKALEMIA: ICD-10-CM

## 2021-12-08 PROBLEM — I63.9 ACUTE CVA (CEREBROVASCULAR ACCIDENT) (HCC): Status: ACTIVE | Noted: 2021-12-08

## 2021-12-08 LAB
A/G RATIO: 1.4 (ref 1.1–2.2)
ALBUMIN SERPL-MCNC: 4.5 G/DL (ref 3.4–5)
ALP BLD-CCNC: 61 U/L (ref 40–129)
ALT SERPL-CCNC: 14 U/L (ref 10–40)
ANION GAP SERPL CALCULATED.3IONS-SCNC: 14 MMOL/L (ref 3–16)
AST SERPL-CCNC: 22 U/L (ref 15–37)
BASOPHILS ABSOLUTE: 0 K/UL (ref 0–0.2)
BASOPHILS RELATIVE PERCENT: 0.8 %
BILIRUB SERPL-MCNC: 1.1 MG/DL (ref 0–1)
BUN BLDV-MCNC: 13 MG/DL (ref 7–20)
CALCIUM SERPL-MCNC: 10.1 MG/DL (ref 8.3–10.6)
CHLORIDE BLD-SCNC: 103 MMOL/L (ref 99–110)
CO2: 24 MMOL/L (ref 21–32)
CREAT SERPL-MCNC: 1 MG/DL (ref 0.8–1.3)
EOSINOPHILS ABSOLUTE: 0 K/UL (ref 0–0.6)
EOSINOPHILS RELATIVE PERCENT: 0.4 %
GFR AFRICAN AMERICAN: >60
GFR NON-AFRICAN AMERICAN: >60
GLUCOSE BLD-MCNC: 120 MG/DL (ref 70–99)
GLUCOSE BLD-MCNC: 121 MG/DL (ref 70–99)
HCT VFR BLD CALC: 51.8 % (ref 40.5–52.5)
HEMOGLOBIN: 17.9 G/DL (ref 13.5–17.5)
INR BLD: 0.96 (ref 0.88–1.12)
LYMPHOCYTES ABSOLUTE: 0.8 K/UL (ref 1–5.1)
LYMPHOCYTES RELATIVE PERCENT: 18.5 %
MAGNESIUM: 2.1 MG/DL (ref 1.8–2.4)
MCH RBC QN AUTO: 31.9 PG (ref 26–34)
MCHC RBC AUTO-ENTMCNC: 34.5 G/DL (ref 31–36)
MCV RBC AUTO: 92.4 FL (ref 80–100)
MONOCYTES ABSOLUTE: 0.6 K/UL (ref 0–1.3)
MONOCYTES RELATIVE PERCENT: 14.2 %
NEUTROPHILS ABSOLUTE: 2.9 K/UL (ref 1.7–7.7)
NEUTROPHILS RELATIVE PERCENT: 66.1 %
PDW BLD-RTO: 13.5 % (ref 12.4–15.4)
PERFORMED ON: ABNORMAL
PLATELET # BLD: 113 K/UL (ref 135–450)
PMV BLD AUTO: 8.5 FL (ref 5–10.5)
POTASSIUM REFLEX MAGNESIUM: 3.4 MMOL/L (ref 3.5–5.1)
PRO-BNP: 390 PG/ML (ref 0–449)
PROTHROMBIN TIME: 10.8 SEC (ref 9.9–12.7)
RBC # BLD: 5.61 M/UL (ref 4.2–5.9)
SODIUM BLD-SCNC: 141 MMOL/L (ref 136–145)
TOTAL CK: 435 U/L (ref 39–308)
TOTAL PROTEIN: 7.8 G/DL (ref 6.4–8.2)
TROPONIN: 0.03 NG/ML
WBC # BLD: 4.3 K/UL (ref 4–11)

## 2021-12-08 PROCEDURE — 84484 ASSAY OF TROPONIN QUANT: CPT

## 2021-12-08 PROCEDURE — 85025 COMPLETE CBC W/AUTO DIFF WBC: CPT

## 2021-12-08 PROCEDURE — 72125 CT NECK SPINE W/O DYE: CPT

## 2021-12-08 PROCEDURE — 83880 ASSAY OF NATRIURETIC PEPTIDE: CPT

## 2021-12-08 PROCEDURE — 70551 MRI BRAIN STEM W/O DYE: CPT

## 2021-12-08 PROCEDURE — 71045 X-RAY EXAM CHEST 1 VIEW: CPT

## 2021-12-08 PROCEDURE — 2580000003 HC RX 258: Performed by: EMERGENCY MEDICINE

## 2021-12-08 PROCEDURE — 4A03X5D MEASUREMENT OF ARTERIAL FLOW, INTRACRANIAL, EXTERNAL APPROACH: ICD-10-PCS | Performed by: RADIOLOGY

## 2021-12-08 PROCEDURE — 6360000002 HC RX W HCPCS: Performed by: INTERNAL MEDICINE

## 2021-12-08 PROCEDURE — 93005 ELECTROCARDIOGRAM TRACING: CPT | Performed by: EMERGENCY MEDICINE

## 2021-12-08 PROCEDURE — 82550 ASSAY OF CK (CPK): CPT

## 2021-12-08 PROCEDURE — 2060000000 HC ICU INTERMEDIATE R&B

## 2021-12-08 PROCEDURE — 80053 COMPREHEN METABOLIC PANEL: CPT

## 2021-12-08 PROCEDURE — 6360000002 HC RX W HCPCS: Performed by: EMERGENCY MEDICINE

## 2021-12-08 PROCEDURE — 83735 ASSAY OF MAGNESIUM: CPT

## 2021-12-08 PROCEDURE — 70496 CT ANGIOGRAPHY HEAD: CPT

## 2021-12-08 PROCEDURE — 85610 PROTHROMBIN TIME: CPT

## 2021-12-08 PROCEDURE — 99284 EMERGENCY DEPT VISIT MOD MDM: CPT

## 2021-12-08 PROCEDURE — 70450 CT HEAD/BRAIN W/O DYE: CPT

## 2021-12-08 PROCEDURE — 6370000000 HC RX 637 (ALT 250 FOR IP): Performed by: EMERGENCY MEDICINE

## 2021-12-08 PROCEDURE — 36415 COLL VENOUS BLD VENIPUNCTURE: CPT

## 2021-12-08 RX ORDER — ONDANSETRON 4 MG/1
4 TABLET, ORALLY DISINTEGRATING ORAL EVERY 8 HOURS PRN
Status: DISCONTINUED | OUTPATIENT
Start: 2021-12-08 | End: 2021-12-14 | Stop reason: HOSPADM

## 2021-12-08 RX ORDER — SODIUM CHLORIDE 0.9 % (FLUSH) 0.9 %
10 SYRINGE (ML) INJECTION EVERY 12 HOURS SCHEDULED
Status: DISCONTINUED | OUTPATIENT
Start: 2021-12-08 | End: 2021-12-14 | Stop reason: HOSPADM

## 2021-12-08 RX ORDER — ASPIRIN 300 MG/1
300 SUPPOSITORY RECTAL DAILY
Status: DISCONTINUED | OUTPATIENT
Start: 2021-12-09 | End: 2021-12-14 | Stop reason: HOSPADM

## 2021-12-08 RX ORDER — SENNA PLUS 8.6 MG/1
2 TABLET ORAL DAILY
Status: DISCONTINUED | OUTPATIENT
Start: 2021-12-08 | End: 2021-12-14 | Stop reason: HOSPADM

## 2021-12-08 RX ORDER — ALENDRONATE SODIUM 70 MG/1
70 TABLET ORAL WEEKLY
Status: DISCONTINUED | OUTPATIENT
Start: 2021-12-08 | End: 2021-12-08

## 2021-12-08 RX ORDER — POTASSIUM CHLORIDE 20 MEQ/1
40 TABLET, EXTENDED RELEASE ORAL PRN
Status: DISCONTINUED | OUTPATIENT
Start: 2021-12-08 | End: 2021-12-14 | Stop reason: HOSPADM

## 2021-12-08 RX ORDER — ATORVASTATIN CALCIUM 10 MG/1
20 TABLET, FILM COATED ORAL NIGHTLY
Status: DISCONTINUED | OUTPATIENT
Start: 2021-12-08 | End: 2021-12-14 | Stop reason: HOSPADM

## 2021-12-08 RX ORDER — PANTOPRAZOLE SODIUM 40 MG/1
40 TABLET, DELAYED RELEASE ORAL DAILY
Status: DISCONTINUED | OUTPATIENT
Start: 2021-12-08 | End: 2021-12-14 | Stop reason: HOSPADM

## 2021-12-08 RX ORDER — ASPIRIN 81 MG/1
81 TABLET ORAL DAILY
Status: DISCONTINUED | OUTPATIENT
Start: 2021-12-09 | End: 2021-12-14 | Stop reason: HOSPADM

## 2021-12-08 RX ORDER — 0.9 % SODIUM CHLORIDE 0.9 %
500 INTRAVENOUS SOLUTION INTRAVENOUS PRN
Status: DISCONTINUED | OUTPATIENT
Start: 2021-12-08 | End: 2021-12-14 | Stop reason: HOSPADM

## 2021-12-08 RX ORDER — LABETALOL HYDROCHLORIDE 5 MG/ML
10 INJECTION, SOLUTION INTRAVENOUS EVERY 10 MIN PRN
Status: DISCONTINUED | OUTPATIENT
Start: 2021-12-08 | End: 2021-12-14 | Stop reason: HOSPADM

## 2021-12-08 RX ORDER — AMLODIPINE BESYLATE 5 MG/1
10 TABLET ORAL DAILY
Status: DISCONTINUED | OUTPATIENT
Start: 2021-12-08 | End: 2021-12-14 | Stop reason: HOSPADM

## 2021-12-08 RX ORDER — LANOLIN ALCOHOL/MO/W.PET/CERES
1000 CREAM (GRAM) TOPICAL DAILY
Status: DISCONTINUED | OUTPATIENT
Start: 2021-12-08 | End: 2021-12-14 | Stop reason: HOSPADM

## 2021-12-08 RX ORDER — HYDRALAZINE HYDROCHLORIDE 20 MG/ML
10 INJECTION INTRAMUSCULAR; INTRAVENOUS EVERY 6 HOURS PRN
Status: DISCONTINUED | OUTPATIENT
Start: 2021-12-08 | End: 2021-12-13

## 2021-12-08 RX ORDER — SODIUM CHLORIDE 9 MG/ML
25 INJECTION, SOLUTION INTRAVENOUS PRN
Status: DISCONTINUED | OUTPATIENT
Start: 2021-12-08 | End: 2021-12-14 | Stop reason: HOSPADM

## 2021-12-08 RX ORDER — SODIUM CHLORIDE 9 MG/ML
1000 INJECTION, SOLUTION INTRAVENOUS ONCE
Status: COMPLETED | OUTPATIENT
Start: 2021-12-08 | End: 2021-12-08

## 2021-12-08 RX ORDER — ASPIRIN 300 MG/1
300 SUPPOSITORY RECTAL ONCE
Status: COMPLETED | OUTPATIENT
Start: 2021-12-08 | End: 2021-12-08

## 2021-12-08 RX ORDER — ONDANSETRON 2 MG/ML
4 INJECTION INTRAMUSCULAR; INTRAVENOUS EVERY 6 HOURS PRN
Status: DISCONTINUED | OUTPATIENT
Start: 2021-12-08 | End: 2021-12-14 | Stop reason: HOSPADM

## 2021-12-08 RX ORDER — SODIUM CHLORIDE 0.9 % (FLUSH) 0.9 %
10 SYRINGE (ML) INJECTION PRN
Status: DISCONTINUED | OUTPATIENT
Start: 2021-12-08 | End: 2021-12-14 | Stop reason: HOSPADM

## 2021-12-08 RX ORDER — LISINOPRIL 10 MG/1
40 TABLET ORAL DAILY
Status: DISCONTINUED | OUTPATIENT
Start: 2021-12-08 | End: 2021-12-14 | Stop reason: HOSPADM

## 2021-12-08 RX ORDER — POTASSIUM CHLORIDE 7.45 MG/ML
10 INJECTION INTRAVENOUS PRN
Status: DISCONTINUED | OUTPATIENT
Start: 2021-12-08 | End: 2021-12-14 | Stop reason: HOSPADM

## 2021-12-08 RX ORDER — ASPIRIN 81 MG/1
324 TABLET, CHEWABLE ORAL ONCE
Status: DISCONTINUED | OUTPATIENT
Start: 2021-12-08 | End: 2021-12-08

## 2021-12-08 RX ORDER — CLOPIDOGREL BISULFATE 75 MG/1
75 TABLET ORAL DAILY
Status: DISCONTINUED | OUTPATIENT
Start: 2021-12-08 | End: 2021-12-14 | Stop reason: HOSPADM

## 2021-12-08 RX ORDER — ONDANSETRON 4 MG/1
4 TABLET, FILM COATED ORAL EVERY 8 HOURS PRN
COMMUNITY

## 2021-12-08 RX ORDER — POTASSIUM CHLORIDE 750 MG/1
40 TABLET, FILM COATED, EXTENDED RELEASE ORAL ONCE
Status: DISCONTINUED | OUTPATIENT
Start: 2021-12-08 | End: 2021-12-08

## 2021-12-08 RX ORDER — POTASSIUM CHLORIDE 7.45 MG/ML
10 INJECTION INTRAVENOUS ONCE
Status: COMPLETED | OUTPATIENT
Start: 2021-12-08 | End: 2021-12-08

## 2021-12-08 RX ADMIN — ASPIRIN 300 MG: 300 SUPPOSITORY RECTAL at 13:55

## 2021-12-08 RX ADMIN — SODIUM CHLORIDE 1000 ML: 9 INJECTION, SOLUTION INTRAVENOUS at 13:48

## 2021-12-08 RX ADMIN — POTASSIUM CHLORIDE 10 MEQ: 7.46 INJECTION, SOLUTION INTRAVENOUS at 13:53

## 2021-12-08 RX ADMIN — HYDRALAZINE HYDROCHLORIDE 10 MG: 20 INJECTION INTRAMUSCULAR; INTRAVENOUS at 18:51

## 2021-12-08 ASSESSMENT — PAIN SCALES - GENERAL
PAINLEVEL_OUTOF10: 0
PAINLEVEL_OUTOF10: 0

## 2021-12-08 NOTE — ED NOTES
Pt moved from ER bed 21 to bed 26. Handoff report given to [facility]. No further questions at this time.       Ovidio Steel RN  12/08/21 3467

## 2021-12-08 NOTE — ED NOTES
Upon arrival, MD bedside to assess NIH.  Stroke alert called  Pt transported to [facility] 0005 CT results at this time     Imani Zapata RN  12/08/21 9760

## 2021-12-08 NOTE — H&P
History and Physical  Dr. Isaiah Valdovinos  12/8/2021    PCP: OLGA White CNP    Cc:   Chief Complaint   Patient presents with    Cerebrovascular Accident     Pt in by EMS from [address] [place] [address] care for stroke-like symptoms including facial droop and slurred speech. Per facility, pt last known well was last night. Per squad, pt fell this am around 0530 and was found to have these symptoms at this time. HPI:  Billy Parrish is a 67 y.o. male who has a past medical history of Cerebrovascular disease, Hyperlipidemia, Hypertension, Osteoarthritis, Risk for falls, and Unspecified cerebral artery occlusion with cerebral infarction. Patient presents with Acute CVA (cerebrovascular accident) ([facility] [address] HPI  (1-3 for expanded problem focused, ?4 for detailed/comprehensive)     67 y.o. male  who presents to the ED complaining of what he says started at 10 PM last night with trouble with speech and communication. He did not get medical attention at the time. This morning around 5 in the morning approximately he tried to get up and go to the bathroom and fell, [facility] care staff apparently got him back into bed but may not have noticed his trouble with speech because EMS was not called until later this morning. Patient does report a history of stroke in the past and is on Plavix. He says he felt fine prior to the 10 PM onset last night. He does not have a headache. He denies any injuries from the fall that occurred earlier this morning, specifically no head or neck injury. Denies any fevers or recent illnesses. He does not feel numb or weak acutely in any of his extremities. Does feel a little uncoordinated but not dizzy. Head CT shows previous dural disease but nothing acute, specifically no bleed. Not a TPA candidate due to timeline of symptoms although his NIH is a 4 and I have a high clinical suspicion for stroke.     Dr. Joanne Dawson from [facility] stroke team was consulted about the patient's ED history, physical, workup, and course so far. Recommendations from this consultant included agreement with no tPA due to timeline of symptoms.           Problem list of hospitalization thus far: Active Hospital Problems    Diagnosis     [facility] [I63.9]     Gastroesophageal reflux disease without esophagitis [K21.9]     Hyperlipidemia [E78.5]     Hypertension [I10]          Review of Systems: (1 system for EPF, 2-9 for detailed, 10+ for comprehensive)  Constitutional: Negative for chills and fever. HENT: Negative for dental problem, nosebleeds and rhinorrhea. Eyes: Negative for photophobia and visual disturbance. Respiratory: Negative for cough, chest tightness and shortness of breath. Cardiovascular: Negative for chest pain and leg swelling. Gastrointestinal: Negative for diarrhea, nausea and vomiting. Endocrine: Negative for polydipsia and polyphagia. Genitourinary: Negative for frequency, hematuria and urgency. Musculoskeletal: Negative for back pain and myalgias. Skin: Negative for rash. Allergic/Immunologic: Negative for food allergies. Neurological: Negative for dizziness, seizures, syncope and facial asymmetry. +speech disturbance  Hematological: Negative for adenopathy. Psychiatric/Behavioral: Negative for dysphoric mood. The patient is not nervous/anxious.              Past Medical History:   Past Medical History:   Diagnosis Date    Cerebrovascular disease 2007    Right leg weakness    Hyperlipidemia     Hypertension     Osteoarthritis     Risk for falls 06/09/2017    YO [age]    Unspecified cerebral artery occlusion with cerebral infarction        Past Surgical History:   Past Surgical History:   Procedure Laterality Date    HERNIA REPAIR      NOSE SURGERY         Social History:   Social History     Tobacco History     Smoking Status  Never Smoker    Smokeless Tobacco Use  Never Used          Alcohol History     Alcohol Use vitals as above: alert, appears stated age and cooperative    Head: Normocephalic, without obvious abnormality, atraumatic    Eyes:lids and lashes normal, conjunctivae and sclerae normal and pupils equal, round, reactive to light and accomodation    EMNT: external ears normal, nares midline    Neck: no carotid bruit, supple, symmetrical, trachea midline and thyroid not enlarged, symmetric, no tenderness/mass/nodules     Respiratory: clear to auscultation and percussion bilaterally with normal respiratory effort    Cardiovascular: normal rate, regular rhythm, normal S1 and S2 and no murmurs    Gastrointestinal: soft, non-tender, non-distended, normal bowel sounds, no masses or organomegaly    Extremities: no clubbing, no edema    Skin:No rashes or nodules noted. Neurologic: +speech slur, mild droop       LABS:  Labs Reviewed   CBC WITH AUTO DIFFERENTIAL - Abnormal; Notable for the following components:       Result Value    Hemoglobin 17.9 (*)     Platelets 159 (*)     Lymphocytes Absolute 0.8 (*)     All other components within normal limits    Narrative:     Performed at:  [facility] [address], [place], [address]   Phone [phone]   COMPREHENSIVE METABOLIC PANEL W/ REFLEX TO MG FOR LOW K - Abnormal; Notable for the following components:    Potassium reflex Magnesium 3.4 (*)     Glucose 121 (*)     Total Bilirubin 1.1 (*)     All other components within normal limits    Narrative:     Performed at:  [facility] [address], [place], [address]   Phone [phone]   TROPONIN - Abnormal; Notable for the following components:    Troponin 0.03 (*)     All other components within normal limits    Narrative:     Performed at:  [facility] [address], [place], [address]   Phone [phone]   CK - Abnormal; Notable for the following components:     Total  (*)     All other components within normal limits    Narrative:     Performed at:  [facility]  [address]  [place], [address]   Phone [phone]   POCT GLUCOSE - Abnormal; Notable for the following components:    POC Glucose 120 (*)     All other components within normal limits    Narrative:     Performed at:  [facility]  [address],  [place], [address]   Phone [phone]   PROTIME-INR    Narrative:     Performed at:  [facility]  [address],  [place], [address]   Phone [phone]   BRAIN NATRIURETIC PEPTIDE    Narrative:     Performed at:  [facility] [address],  [place], [address]   Phone [phone]   MAGNESIUM    Narrative:     Performed at:  [facility]  [address],  [place], [address]   Phone [phone]   URINE RT REFLEX TO CULTURE   POCT GLUCOSE         IMAGING:  Imaging results from the ER have been reviewed in the computerized chart. CT HEAD WO CONTRAST    Result Date: 12/8/2021  EXAMINATION: CT OF THE HEAD WITHOUT CONTRAST  12/8/2021 11:48 am TECHNIQUE: CT of the head was performed without the administration of intravenous contrast. Dose modulation, iterative reconstruction, and/or weight based adjustment of the mA/kV was utilized to reduce the radiation dose to as low as reasonably achievable. COMPARISON: Head CT 07/08/2021 HISTORY: ORDERING SYSTEM PROVIDED HISTORY: speech trouble TECHNOLOGIST PROVIDED HISTORY: Reason for exam:-> speech trouble Has a \"code stroke\" or \"stroke alert\" been called?-> yes Decision Support Exception - unselect if not a suspected or confirmed emergency medical condition->Emergency Medical Condition (MA) FINDINGS: BRAIN/VENTRICLES: There is no acute intracranial hemorrhage, mass effect or midline shift. No abnormal extra-axial fluid collection.   The gray-white differentiation is maintained without evidence of an acute infarct. There is prominence of the ventricles and sulci due to global parenchymal volume loss. There are nonspecific areas of hypoattenuation within the periventricular and subcortical white matter, which likely represent chronic microvascular ischemic change. Remote lacunar stroke bilateral thalamus and left caudate lobe. ORBITS: The visualized portion of the orbits demonstrate no acute abnormality. SINUSES: The visualized paranasal sinuses and mastoid air cells demonstrate no acute abnormality. SOFT TISSUES/SKULL: No acute abnormality of the visualized skull or soft tissues. 1. No acute intracranial abnormality. 2. Remote lacunar stroke bilateral thalamus and left caudate lobe. 3. Senescent changes. 4.  White matter hypoattenuation described is typical of microvascular ischemic disease or as sequela of dysmyelinating/demyelinating processes. Per stroke alert protocol, the at Dr. Venice Horn radiology any [place] Fried results were called by Dr. Tony Post to [facility] on 12/8/2021 at 12:13. CT CERVICAL SPINE WO CONTRAST    Result Date: 12/8/2021  EXAMINATION: CT OF THE CERVICAL SPINE WITHOUT CONTRAST 12/8/2021 11:48 am TECHNIQUE: CT of the cervical spine was performed without the administration of intravenous contrast. Multiplanar reformatted images are provided for review. Dose modulation, iterative reconstruction, and/or weight based adjustment of the mA/kV was utilized to reduce the radiation dose to as low as reasonably achievable. COMPARISON: 07/08/2021 HISTORY: ORDERING SYSTEM PROVIDED HISTORY: stroke alert, fall TECHNOLOGIST PROVIDED HISTORY: Reason for exam:->stroke alert, fall Decision Support Exception - unselect if not a suspected or confirmed emergency medical condition->Emergency Medical Condition (MA) FINDINGS: BONES/ALIGNMENT: There is no acute fracture or traumatic malalignment. DEGENERATIVE CHANGES: Multilevel degenerative changes.  SOFT TISSUES: There is no prevertebral soft tissue swelling. No acute abnormality of the cervical spine. XR CHEST PORTABLE    Result Date: 12/8/2021  EXAMINATION: ONE XRAY VIEW OF THE CHEST 12/8/2021 12:25 pm COMPARISON: Chest radiograph 4-7-2019 HISTORY: ORDERING SYSTEM PROVIDED HISTORY: dizziness TECHNOLOGIST PROVIDED HISTORY: Reason for exam:->dizziness FINDINGS: Lung volumes are low. No pulmonary consolidation, effusion or pneumothorax. There is borderline cardiac enlargement. Mild tortuosity of the thoracic aorta. 1. Borderline cardiomegaly. 2.   No evidence of pulmonary edema. EKG:   EKG from ER, reviewed by self  it shows normal sinus rhyhtm at 87, no acute st elevation noted  Old chart reviewed, EKG dated 1.11.21 is reviewed, there is  difference noted. Old study shows sinus at 70    Lab Results   Component Value Date    GLUCOSE 121 12/08/2021    GLUCOSE 102 05/09/2017     Lab Results   Component Value Date    POCGLU 120 12/08/2021     BP (!) 177/106   Pulse 82   Temp 98.1 °F (36.7 °C) (Oral)   Resp 16   Ht 5' 6\" (1.676 m)   Wt 178 lb (80.7 kg)   SpO2 (!) 89%   BMI 28.73 kg/m²     MEDICAL DECISION MAKING:    Principal Problem:    Acute CVA (cerebrovascular accident) ([address] -Established problem. Pt with fall, speech disturbance, mild droop. CT head without acute cva  Plan: admit. Neuro asked to see. PT/OT to see. MRI brain orderd. Active Problems:    Hypertension -Established problem. Uncontrolled. 177/106  Plan: Pt home BP meds reviewed and will be continued. IV Hydralazine ordered for control of extremely high blood pressures. Will monitor labs to assess Creat/K for possible complications of medications. Hyperlipidemia -Established problem. Stable. Plan: Continue present orders/plan. Stay on statin    Gastroesophageal reflux disease without esophagitis -Established problem. Stable.     Plan: cont PPI          Diagnoses as listed above, designated as new or established and plan outlined for each.     Data Reviewed:   (1) Lab tests were reviewed or ordered. (1) Radiology tests were reviewed or ordered. (1) Medical test (Echo, EKG, PFT/yadiel) were ordered. (1)History was not obtained from someone other than patient  (1) Old records were reviewed - see HPI/MDM for pertinent details if review done. (2) Case was discussed with another health care provider: Dr Lala Sam  (2) Imaging was viewed by myself. Ct head  (2) EKG  was viewed by myself. The patient is being placed in inpatient status with the expectation of requiring a hospital stay spanning at least two midnights for care and treatment of the problems noted in the problem list.  This determination is also based on thepatients comorbidities and past medical history, the severity and timing of the signs and symptoms upon presentation.     (Please note that portions of this note were completed with a voice recognition program.  Efforts were made to edit the dictations but occasionally words are mis-transcribed.)      Electronically signed by: Mary Davey MD 12/8/2021

## 2021-12-08 NOTE — ED PROVIDER NOTES
[facility] Emergency Department    CHIEF COMPLAINT  Chief Complaint   Patient presents with    Cerebrovascular Accident     Pt in by EMS from [address] [place] [address] care for stroke-like symptoms including facial droop and slurred speech. Per facility, pt last known well was last night. Per squad, pt fell this am around 0530 and was found to have these symptoms at this time. HISTORY OF PRESENT ILLNESS  Gabino Pak is a 67 y.o. male  who presents to the ED complaining of what he says started at 10 PM last night with trouble with speech and communication. He did not get medical attention at the time. This morning around 5 in the morning approximately he tried to get up and go to the bathroom and fell, [facility] care staff apparently got him back into bed but may not have noticed his trouble with speech because EMS was not called until later this morning. Patient does report a history of stroke in the past and is on Plavix. He says he felt fine prior to the 10 PM onset last night. He does not have a headache. He denies any injuries from the fall that occurred earlier this morning, specifically no head or neck injury. Denies any fevers or recent illnesses. He does not feel numb or weak acutely in any of his extremities. Does feel a little uncoordinated but not dizzy. No other complaints, modifying factors or associated symptoms. I have reviewed the following from the nursing documentation.     Past Medical History:   Diagnosis Date    Cerebrovascular disease 2007    Right leg weakness    Hyperlipidemia     Hypertension     Osteoarthritis     Risk for falls 06/09/2017    YO 18/56    Unspecified cerebral artery occlusion with cerebral infarction      Past Surgical History:   Procedure Laterality Date    HERNIA REPAIR      NOSE SURGERY       Family History   Problem Relation Age of Onset    Heart Disease Father 64    Diabetes Mother     Heart Disease Mother     High Blood Pressure Mother     Breast Cancer Maternal Grandmother     Breast Cancer Sister      Social History     Socioeconomic History    Marital status:      Spouse name: Not on file    Number of children: Not on file    Years of education: Not on file    Highest education level: Not on file   Occupational History    Occupation: Retired   Tobacco Use    Smoking status: Never Smoker    Smokeless tobacco: Never Used   Vaping Use    Vaping Use: Never used   Substance and Sexual Activity    Alcohol use: No     Alcohol/week: 0.0 standard drinks    Drug use: No    Sexual activity: Not Currently   Other Topics Concern    Not on file   Social History Narrative    Lives at home. Wife in nursing home. Social Determinants of Health     Financial Resource Strain: Low Risk     Difficulty of Paying Living Expenses: Not hard at all   Food Insecurity: No Food Insecurity    Worried About Running Out of Food in the Last Year: Never true    Umesh of Food in the Last Year: Never true   Transportation Needs: No Transportation Needs    Lack of Transportation (Medical): No    Lack of Transportation (Non-Medical):  No   Physical Activity:     Days of Exercise per Week: Not on file    Minutes of Exercise per Session: Not on file   Stress:     Feeling of Stress : Not on file   Social Connections:     Frequency of Communication with Friends and Family: Not on file    Frequency of Social Gatherings with Friends and Family: Not on file    Attends Buddhism Services: Not on file    Active Member of Clubs or Organizations: Not on file    Attends Club or Organization Meetings: Not on file    Marital Status: Not on file   Intimate Partner Violence:     Fear of Current or Ex-Partner: Not on file    Emotionally Abused: Not on file    Physically Abused: Not on file    Sexually Abused: Not on file   Housing Stability:     Unable to Pay for Housing in the Last Year: Not on file    Number of [place] in the Last Year: Not on file    Unstable Housing in the Last Year: Not on file     Current Facility-Administered Medications   Medication Dose Route Frequency Provider Last Rate Last Admin    iopamidol (ISOVUE-370) 76 % injection 75 mL  75 mL IntraVENous ONCE PRN Jarred Manning MD        0.9 % sodium chloride infusion  1,000 mL IntraVENous Once Jarred Manning MD        potassium chloride 10 mEq/100 mL IVPB (Peripheral Line)  10 mEq IntraVENous Once Jarred Manning MD        aspirin suppository 300 mg  300 mg Rectal Once Jarred Manning MD         Current Outpatient Medications   Medication Sig Dispense Refill    alendronate (FOSAMAX) 70 MG tablet TAKE 1 TABLET EVERY 7 DAYS 12 tablet 1    pantoprazole (PROTONIX) 40 MG tablet Take 1 tablet by mouth daily 90 tablet 1    simvastatin (ZOCOR) 20 MG tablet TAKE 1 TABLET NIGHTLY 90 tablet 1    clopidogrel (PLAVIX) 75 MG tablet TAKE 1 TABLET DAILY 90 tablet 3    lisinopril (PRINIVIL;ZESTRIL) 40 MG tablet TAKE 1 TABLET DAILY 90 tablet 3    cyanocobalamin 1000 MCG tablet Take 1,000 mcg by mouth daily      senna (SENOKOT) 8.6 MG tablet Take 2 tablets by mouth daily      clotrimazole-betamethasone (LOTRISONE) 1-0.05 % cream APPLY TOPICALLY TWICE DAILY 45 g 0    Compression Stockings MISC Compression for BLE daily 1 each 0    amLODIPine (NORVASC) 10 MG tablet TAKE 1 TABLET DAILY 90 tablet 1     No Known Allergies    REVIEW OF SYSTEMS  10 systems reviewed, pertinent positives per HPI otherwise noted to be negative. PHYSICAL EXAM  BP (!) 177/106   Pulse 82   Temp 98.1 °F (36.7 °C) (Oral)   Resp 16   Ht 5' 6\" (1.676 m)   Wt 178 lb (80.7 kg)   SpO2 97%   BMI 28.73 kg/m²    GENERAL APPEARANCE: Awake and alert. Cooperative. No distress. HENT: Normocephalic. Atraumatic. Mucous membranes are dry. NECK: Supple. No Cspine ttp. EYES: PERRL. EOM's grossly intact. No nystagmus, no vertical skew deviation  HEART/CHEST: RRR. No murmurs.   No chest wall tenderness. LUNGS: Respirations unlabored. CTAB. Good air exchange. Speaking comfortably in full sentences. ABDOMEN: No tenderness. Soft. Non-distended. No masses. No organomegaly. No guarding or rebound. Normal bowel sounds throughout. MUSCULOSKELETAL: No extremity edema. Compartments soft. No deformity. No tenderness in the extremities. All extremities neurovascularly intact. SKIN: Warm and dry. No acute rashes. NEUROLOGICAL: Alert and oriented. CN's 2-12 intact except subtle right-sided facial drooping with aphasia and dysarthria but speech is understandable. Strength 5/5, sensation intact x4 extremities. 2 plus DTR's in knees bilaterally. Gait assessed. Ataxic with finger-nose-finger testing in the left upper extremity. Right upper extremity finger-nose-finger testing is normal.  Normal heel shin bilaterally. PSYCHIATRIC: Normal mood and affect. NIH Stroke Scale  NIH Stroke Scale Assessed: Yes  Interval: Baseline  Level of Consciousness (1a. ): Alert  LOC Questions (1b. ): Answers both correctly  LOC Commands (1c. ): Performs both tasks correctly  Best Gaze (2. ): Normal  Visual (3. ): No visual loss  Facial Palsy (4. ): (!) Minor paralysis  Motor Arm, Left (5a. ): No drift  Motor Arm, Right (5b. ): Drift, but does not hit bed  Motor Leg, Left (6a. ): No drift  Motor Leg, Right (6b. ): Drift, but does not hit bed  Limb Ataxia (7. ): Absent  Sensory (8. ): Normal  Best Language (9. ): Mild to moderate aphasia  Dysarthria (10. ): Mild to moderate, slurs some words  Extinction and Inattention (11): No abnormality  Total: 5      LABS  I have reviewed all labs for this visit.    Results for orders placed or performed during the hospital encounter of 12/08/21   CBC auto differential   Result Value Ref Range    WBC 4.3 4.0 - 11.0 K/uL    RBC 5.61 4.20 - 5.90 M/uL    Hemoglobin 17.9 (H) 13.5 - 17.5 g/dL    Hematocrit 51.8 40.5 - 52.5 %    MCV 92.4 80.0 - 100.0 fL    MCH 31.9 26.0 - 34.0 pg    MCHC 34.5 by me as follows:  Rate: normal with a rate of 87  Rhythm: sinus  Axis: normal  Intervals: normal [place], narrow QRS, normal QTc  ST segments: no ST elevations or depressions  T waves: no abnormal inversions  Non-specific T wave changes: present  Prior EKG comparison: EKG dated 1/11/21 is not significantly different    RADIOLOGY    CT HEAD WO CONTRAST    Result Date: 12/8/2021  EXAMINATION: CT OF THE HEAD WITHOUT CONTRAST  12/8/2021 11:48 am TECHNIQUE: CT of the head was performed without the administration of intravenous contrast. Dose modulation, iterative reconstruction, and/or weight based adjustment of the mA/kV was utilized to reduce the radiation dose to as low as reasonably achievable. COMPARISON: Head CT 07/08/2021 HISTORY: ORDERING SYSTEM PROVIDED HISTORY: speech trouble TECHNOLOGIST PROVIDED HISTORY: Reason for exam:-> speech trouble Has a \"code stroke\" or \"stroke alert\" been called?-> yes Decision Support Exception - unselect if not a suspected or confirmed emergency medical condition->Emergency Medical Condition (MA) FINDINGS: BRAIN/VENTRICLES: There is no acute intracranial hemorrhage, mass effect or midline shift. No abnormal extra-axial fluid collection. The gray-white differentiation is maintained without evidence of an acute infarct. There is prominence of the ventricles and sulci due to global parenchymal volume loss. There are nonspecific areas of hypoattenuation within the periventricular and subcortical white matter, which likely represent chronic microvascular ischemic change. Remote lacunar stroke bilateral thalamus and left caudate lobe. ORBITS: The visualized portion of the orbits demonstrate no acute abnormality. SINUSES: The visualized paranasal sinuses and mastoid air cells demonstrate no acute abnormality. SOFT TISSUES/SKULL: No acute abnormality of the visualized skull or soft tissues. 1. No acute intracranial abnormality.  2. Remote lacunar stroke bilateral thalamus and left caudate patient's ED workup was notable for aphasia with dysarthria, onset of symptoms last night at 10 PM.  Given symptoms within 24 hours a stroke alert was called. Head CT shows previous dural disease but nothing acute, specifically no bleed. Not a TPA candidate due to timeline of symptoms although his NIH is a 4 and I have a high clinical suspicion for stroke. Minimal hypokalemia will be repleted. Troponin is also minimally elevated but in the absence of chest pain or acute ischemic EKG changes is likely represents potential brain ischemia and not actually acute coronary syndrome. His kidney function is normal today. CK is also mildly elevated but not enough to suggest rhabdomyolysis and he denies any significant prolonged downtime from his fall earlier this morning. He will be admitted to the hospital for further testing and evaluation. Dr. Eli Freeman from [facility] stroke team was consulted about the patient's ED history, physical, workup, and course so far. Recommendations from this consultant included agreement with no tPA due to timeline of symptoms. During the patient's ED course, the patient was given:  Medications   iopamidol (ISOVUE-370) 76 % injection 75 mL (has no administration in time range)   0.9 % sodium chloride infusion (has no administration in time range)   potassium chloride 10 mEq/100 mL IVPB (Peripheral Line) (has no administration in time range)   aspirin suppository 300 mg (has no administration in time range)        CLINICAL IMPRESSION  1. Aphasia    2. Troponin level elevated    3. Hypokalemia          DISPOSITION  Yana Machado was admitted in fair condition. The plan is to admit to the hospital at this time under the nursing home / SNF admitting service. Dr. Kaity Khalil accepted the patient and will take over the patient's care. The total critical care time spent while evaluating and treating this patient was 37 minutes. This excludes time spent doing separately billable procedures. This includes time at the bedside, data interpretation, medication management, obtaining critical history from collateral sources if the patient is unable to provide it directly, and physician consultation. Specifics of interventions taken and potentially life-threatening diagnostic considerations are listed above in the medical decision making. DISCLAIMER: This chart was created using Dragon dictation software. Efforts were made by me to ensure accuracy, however some errors may be present due to limitations of this technology and occasionally words are not transcribed correctly.         Juan Jimenes MD  12/08/21 2541

## 2021-12-08 NOTE — ED NOTES
Bed: 26  Expected date:   Expected time:   Means of arrival:   Comments:  Ciro Manning RN  12/08/21 [id number]

## 2021-12-08 NOTE — ED NOTES
Bed: 21  Expected date:   Expected time:   Means of arrival: [facility]  Comments:  [address] Suleiman Garcia RN  12/08/21 3242

## 2021-12-09 ENCOUNTER — APPOINTMENT (OUTPATIENT)
Dept: GENERAL RADIOLOGY | Age: 76
DRG: 065 | End: 2021-12-09
Payer: MEDICARE

## 2021-12-09 LAB
A/G RATIO: 1.2 (ref 1.1–2.2)
ALBUMIN SERPL-MCNC: 3.8 G/DL (ref 3.4–5)
ALP BLD-CCNC: 52 U/L (ref 40–129)
ALT SERPL-CCNC: 12 U/L (ref 10–40)
ANION GAP SERPL CALCULATED.3IONS-SCNC: 13 MMOL/L (ref 3–16)
AST SERPL-CCNC: 19 U/L (ref 15–37)
BASOPHILS ABSOLUTE: 0 K/UL (ref 0–0.2)
BASOPHILS RELATIVE PERCENT: 0.4 %
BILIRUB SERPL-MCNC: 0.8 MG/DL (ref 0–1)
BUN BLDV-MCNC: 17 MG/DL (ref 7–20)
CALCIUM SERPL-MCNC: 9.4 MG/DL (ref 8.3–10.6)
CHLORIDE BLD-SCNC: 108 MMOL/L (ref 99–110)
CHOLESTEROL, TOTAL: 123 MG/DL (ref 0–199)
CO2: 21 MMOL/L (ref 21–32)
CREAT SERPL-MCNC: 1.2 MG/DL (ref 0.8–1.3)
EOSINOPHILS ABSOLUTE: 0 K/UL (ref 0–0.6)
EOSINOPHILS RELATIVE PERCENT: 0.2 %
ESTIMATED AVERAGE GLUCOSE: 116.9 MG/DL
GFR AFRICAN AMERICAN: >60
GFR NON-AFRICAN AMERICAN: 59
GLUCOSE BLD-MCNC: 111 MG/DL (ref 70–99)
HBA1C MFR BLD: 5.7 %
HCT VFR BLD CALC: 48.6 % (ref 40.5–52.5)
HDLC SERPL-MCNC: 37 MG/DL (ref 40–60)
HEMOGLOBIN: 16.7 G/DL (ref 13.5–17.5)
LDL CHOLESTEROL CALCULATED: 70 MG/DL
LYMPHOCYTES ABSOLUTE: 1 K/UL (ref 1–5.1)
LYMPHOCYTES RELATIVE PERCENT: 17.8 %
MAGNESIUM: 2.2 MG/DL (ref 1.8–2.4)
MCH RBC QN AUTO: 31.9 PG (ref 26–34)
MCHC RBC AUTO-ENTMCNC: 34.3 G/DL (ref 31–36)
MCV RBC AUTO: 93.1 FL (ref 80–100)
MONOCYTES ABSOLUTE: 0.7 K/UL (ref 0–1.3)
MONOCYTES RELATIVE PERCENT: 12.2 %
NEUTROPHILS ABSOLUTE: 4 K/UL (ref 1.7–7.7)
NEUTROPHILS RELATIVE PERCENT: 69.4 %
PDW BLD-RTO: 13.8 % (ref 12.4–15.4)
PLATELET # BLD: 109 K/UL (ref 135–450)
PMV BLD AUTO: 9 FL (ref 5–10.5)
POTASSIUM REFLEX MAGNESIUM: 3.3 MMOL/L (ref 3.5–5.1)
RBC # BLD: 5.23 M/UL (ref 4.2–5.9)
SODIUM BLD-SCNC: 142 MMOL/L (ref 136–145)
TOTAL PROTEIN: 6.9 G/DL (ref 6.4–8.2)
TRIGL SERPL-MCNC: 79 MG/DL (ref 0–150)
VLDLC SERPL CALC-MCNC: 16 MG/DL
WBC # BLD: 5.7 K/UL (ref 4–11)

## 2021-12-09 PROCEDURE — 97530 THERAPEUTIC ACTIVITIES: CPT

## 2021-12-09 PROCEDURE — 92526 ORAL FUNCTION THERAPY: CPT

## 2021-12-09 PROCEDURE — 83735 ASSAY OF MAGNESIUM: CPT

## 2021-12-09 PROCEDURE — 74230 X-RAY XM SWLNG FUNCJ C+: CPT

## 2021-12-09 PROCEDURE — 36415 COLL VENOUS BLD VENIPUNCTURE: CPT

## 2021-12-09 PROCEDURE — 92611 MOTION FLUOROSCOPY/SWALLOW: CPT

## 2021-12-09 PROCEDURE — 6360000002 HC RX W HCPCS: Performed by: INTERNAL MEDICINE

## 2021-12-09 PROCEDURE — 80061 LIPID PANEL: CPT

## 2021-12-09 PROCEDURE — 6370000000 HC RX 637 (ALT 250 FOR IP): Performed by: INTERNAL MEDICINE

## 2021-12-09 PROCEDURE — 92610 EVALUATE SWALLOWING FUNCTION: CPT

## 2021-12-09 PROCEDURE — 92523 SPEECH SOUND LANG COMPREHEN: CPT

## 2021-12-09 PROCEDURE — 97535 SELF CARE MNGMENT TRAINING: CPT

## 2021-12-09 PROCEDURE — 99223 1ST HOSP IP/OBS HIGH 75: CPT | Performed by: PSYCHIATRY & NEUROLOGY

## 2021-12-09 PROCEDURE — 2060000000 HC ICU INTERMEDIATE R&B

## 2021-12-09 PROCEDURE — 83036 HEMOGLOBIN GLYCOSYLATED A1C: CPT

## 2021-12-09 PROCEDURE — 85025 COMPLETE CBC W/AUTO DIFF WBC: CPT

## 2021-12-09 PROCEDURE — 97166 OT EVAL MOD COMPLEX 45 MIN: CPT

## 2021-12-09 PROCEDURE — 97162 PT EVAL MOD COMPLEX 30 MIN: CPT

## 2021-12-09 PROCEDURE — 2580000003 HC RX 258: Performed by: INTERNAL MEDICINE

## 2021-12-09 PROCEDURE — 80053 COMPREHEN METABOLIC PANEL: CPT

## 2021-12-09 RX ORDER — SODIUM CHLORIDE 9 MG/ML
INJECTION, SOLUTION INTRAVENOUS CONTINUOUS
Status: DISCONTINUED | OUTPATIENT
Start: 2021-12-09 | End: 2021-12-10

## 2021-12-09 RX ADMIN — ENOXAPARIN SODIUM 40 MG: 100 INJECTION SUBCUTANEOUS at 18:12

## 2021-12-09 RX ADMIN — ASPIRIN 300 MG: 300 SUPPOSITORY RECTAL at 18:12

## 2021-12-09 RX ADMIN — Medication 10 ML: at 22:35

## 2021-12-09 RX ADMIN — SODIUM CHLORIDE: 9 INJECTION, SOLUTION INTRAVENOUS at 22:37

## 2021-12-09 ASSESSMENT — PAIN SCALES - GENERAL
PAINLEVEL_OUTOF10: 0

## 2021-12-09 ASSESSMENT — PAIN SCALES - WONG BAKER
WONGBAKER_NUMERICALRESPONSE: 0

## 2021-12-09 NOTE — PROCEDURES
[facility] SPEECH THERAPY  MODIFIED BARIUM SWALLOW EVALUATION    Patient's Name: Haley Desai. O.B: 1945  Medical Diagnosis: Aphasia [R47.01]  Hypokalemia [E87.6]  Troponin level elevated [R77.8]  Acute CVA (cerebrovascular accident) ([facility] [address] [I63.9]  Treatment Diagnosis: Dysphagia    Ordering MD: Dr. Cas Diaz   Radiologist: Dr. Ye [place]   Date of Evaluation: 12/9/2021  Type of Study: Modified Barium Swallowing Study (MBS)  Diet Prior to Study:  NPO   Pain Level: denies     Impression:  Modified Barium Swallow evaluation completed on 12/9/2021. Results indicate moderate/severe oropharyngeal dysphagia. Pt demonstrated deep laryngeal penetration and silent aspiration of thin liquids and deep laryngeal penetration of Mildly Thick (Nectar) Liquids that was not self clearing. Cued cough was weak and not effective. Although pt did not demonstrate aspiration of Moderately Thick (honey) Liquids or puree during this study moderately impaired oral and pharyngeal clearing was assessed with all textures which pt demonstrated difficulty clearing. He also demonstrates severely impaired oral phase of the swallow which impacts pt's ability to functionally accept PO. At this time pt demonstrates high risk for aspiration due to oral and pharyngeal phase deficits, new CVA and impaired reflexive/voltional cough. He also demonstrates high risk for malnutrition due to severity of dysphagia. See below for recommendations. Oral phase of the swallow was characterized by impaired oral bolus control with anterior loss due to decreased labial seal and posterior bolus loss due to impaired tongue control, decreased A-P bolus propulsion and impaired oral clearing with oral stasis post swallows. With thin and Mildly Thick (Nectar) Liquids pt demonstrated anterior bolus loss below the mid chin.  With Moderately Thick (honey) Liquids and puree pt demonstrated impaired A-P bolus transit with minimal to no tongue motion resulting in pt attempting to tilt head back to assist with bolus propulsion. Pt was unable to maintain labial seal during the swallow for the duration of the swallow impacting oral and pharyngeal clearing (at times when pt was able to maintain seal improved clearing was noted). Collection of oral stasis was assessed with all textures (pt required oral care following the study). Pharyngeal phase of the swallow was characterized by pharyngeal pooling, delayed swallow initiation, decreased airway protection and impaired pharyngeal clearing. Pooling to the pyriform sinus was assessed with all textures. Impaired pharyngeal clearing appeared to be due to decreased anterior hyoid movement with reduced epiglottic distension and decreased pharyngoesophageal segment opening. At times post the initial swallow majority of bolus was noted within the pharynx with pt requiring cues to re-swallow. Episodes of penetration/aspiration appeared to be due to deep pharyngeal pooling, delayed swallow initiation, decreased epiglottic movement and delayed pharyngeal clearing. Following the study pt demonstrated congested respiratory quality and repeatedly attempted to cough and clear his throat. Aspiration/Penetration Risk:  Moderate     Recommendations:    Diet Level:   1) NPO with ongoing assessment of PO tolerance at bedside and implementation of strategies prior to diet advance   2) Due to severity of dysphagia pt may benefit from discussion of code status and wishes re; alternative means of nutrition/hydration     Strategies: Alternate solids/liquids , Check for pocketing of food L, Check for pocketing of food R, Effortful Swallows , Upright as possible with all PO intake , Small bites/sips , Swallow 2 times per bite , Remain upright 30-45 min      Treatments: Speech Therapy for dysphagia treatment  Goals:  1.   Pt will functionally tolerate trials of Moderately Thick (honey) Liquids and puree with SLP with no overt clinical s/s of aspiration/penetration   2. Pt/family will demonstrate understanding of results Modified Barium Swallow Study, risk for aspiration, rationale for diet level and compensatory strategies   3. Pt will utilize appropriate compensatory strategies as indicated with min with cues (head rotation)   4.   Pt will demonstrate improved sensory motor function via targeted exercises and appropriate treatment modalities     Consistencies given: Thin, Mildly Thick (Nectar) Liquids, Moderately Thick (honey) Liquids, Puree, Soft solid, Solid    Oral Phase  -Decreased labial seal with anterior loss beyond mid-chin   -With thin and Mildly Thick (Nectar) Liquids; decreased bolus control with posterior escape   -With all textures; impaired A-P bolus transit with minimal to no tongue motion   -Residue collection on oral structures     Pharyngeal Phase  -Delayed swallow onset with pooling to the pyriforms  -Decreased anterior hyoid movement   -Decreased/partial epiglottic inversion   -Decreased laryngeal penetration   -Decreased pharyngoesophageal segment opening (minimal distension/minimal duration; obstruction of flow)   -Decreased tongue base retraction   -Pharyngeal residue; majority of contrast within or on pharyngeal structures     Penetration/Aspiration Scores across consistencies   CONSISTENCY  Pen/Asp rating Description    Thin   8, 4    Mildly (nectar) thick   5    Moderately (honey) thick  1    Puree  1    Soft Solid  N/A    Regular Solid  N/A            KEY:   1) Material does not enter the airway   2) Material enters the airway, remains above the vocal folds, and is ejected from the airway   3) Material enters the airway, remains above the vocal folds, and is not ejected from the airway   4) Material enters the airway, contacts the vocal folds, and is ejected from the airway   5) Material enters the airway, contacts the vocal folds, and is not ejected from the airway   6) Material enters the airway, passes below the vocal

## 2021-12-09 NOTE — PROGRESS NOTES
Facility/Department: [address]  SLP Clinical Swallow Evaluation and Speech Language Cognitive Assessment     Patient: Claria Baumgarten   : 1945   MRN: [id number]      Evaluation Date: 2021      Admitting Dx: Aphasia [R47.01]  Hypokalemia [E87.6]  Troponin level elevated [R77.8]  Acute CVA (cerebrovascular accident) ([facility] [address] [I63.9]  Pain: denies                                   H&P: \"76 y. o. male  who presents to the ED complaining of what he says started at 10 PM last night with trouble with speech and communication. Dara De Santiago did not get medical attention at the time.  This morning around 5 in the morning approximately he tried to get up and go to the bathroom and fell, [facility]'s care staff apparently got him back into bed but may not have noticed his trouble with speech because EMS was not called until later this morning.  Patient does report a history of stroke in the past and is on Plavix.  He says he felt fine prior to the 10 PM onset last night.  He does not have a headache.  He denies any injuries from the fall that occurred earlier this morning, specifically no head or neck injury.  Denies any fevers or recent illnesses.  He does not feel numb or weak acutely in any of his extremities.  Does feel a little uncoordinated but not dizzy. Head CT shows previous dural disease but nothing acute, specifically no bleed.  Not a TPA candidate due to timeline of symptoms although his NIH is a 4 and I have a high clinical suspicion for stroke Dr. Mae from  stroke team was consulted about the patient's ED history, physical, workup, and course so far.  Recommendations from this consultant included agreement with no tPA due to timeline of symptoms. \"    Chest xray:   Impression   1. Borderline cardiomegaly. 2.   No evidence of pulmonary edema.      MRI Brain:   Impression   Acute lacunar infarct within the right mid corona radiata.       Likely subacute lacunar infarct within the contralateral mid corona radiata. History/Prior Level of Function:   Living Status: ECF   Prior Dysphagia/Speech History: No prior hx noted in chart review. DYSPHAGIA BEDSIDE SWALLOW EVALUATION   Dysphagia Impressions/Dysphagia Diagnosis: Oropharyngeal Dysphagia   SLP eval and treat orders received per CVA protocol. Pt failed swallow screen with RN. Pt NPO pending assessment. Oral mechanism exam; dry oral cavity, reduced oral cavity opening, left sided labial asymmetry, severely decreased labial ROM/coordination, severely decreased lingual ROM/coordination. Gag reflex was present. Volitional swallow was delayed but present. Limited trials of ice chips, thin liquids via tsp and Mildly Thick (Nectar) Liquids were presented. With ice chip pt demonstrated anterior bolus loss 4/4 attempted trials, unable to appropriately seal oral cavity and manipulate ice chips. Thin liquids and Mildly Thick (Nectar) Liquids via tsp revealed oral holding with severely decreased/absent lingual manipulation with pt tipping head back to assist with bolus propulsion, pharyngeal pooling was suspected with delayed swallow initiation and decreased laryngeal elevation and use of multiple swallows to clear. With thin liquids pt demonstrated delayed coughing which may be indicative of penetration/aspiration. With Mildly Thick (Nectar) Liquids no overt clinical s/s of aspiration/penetration were assessed however, slight vocal quality change was noted. Due to severity of oral phase deficits and new CVA recommend completion of Modified Barium Swallow prior to diet advance.      Recommended Diet and Intervention:  Diet Solids Recommendation:  NPO pending MBS  Liquid Consistency Recommendation:   Recommended form of Meds:   **Recommend completion of Modified Barium Swallow Study to further assess pharyngeal phase of swallow, assess for aspiration and to further assist with diet recommendations/POC prior to diet recommendations     Dysphagia Therapeutic Intervention:  Patient/Family Education , Therapeutic Trials with SLP , Instrumental assessment of swallow function (Modified Barium Swallow Study)     Compensatory Swallowing Strategies: Other:  TBD     Oral Mechanism Exam:  []WFL []Mild   [] Moderate  [x]Severe  []To be assessed  Labial ROM, Labial Symmetry , Labial Strength, Labial Coordination , Lingual ROM, Lingual Symmetry , Lingual Strength , Lingual Coordination     SHORT TERM DYSPHAGIA GOALS  Pt will functionally tolerate ongoing assessment of swallow function with diet to be determined as indicated   Pt will participate in Modified Barium swallow study to further assess pharyngeal phase of swallow, assist with diet recommendations and to further direct plan of care     Patient Positioning: Upright in bed     SPEECH LANGUAGE COGNITIVE ASSESSMENT:     Speech Diagnosis:   Expressive Aphasia ,  Dysarthria  Vs Apraxia of Speech     Impressions: Pt was awake and alert for assessment. Prior hx of CVA noted in chart review but no prior hx of speech language deficits. Via yes/no questions pt denied every needing speech therapy following CVA. Pt demonstrated expressive aphasia characterized by anomia and decreased word finding. Severely decreased oral motor movement was noted with severely decreased speech intelligibility. When pt attempted to repeat words or engage in simple automatic speech he was severely unintelligible. Question apraxia vs dysarthria or a combination at this time. Pt was able to communicate via simple yes/no questions. Writing was attempted however, decreased legibility was noted. Communication board was presented however, pt demonstrated difficulty navigating unfamiliar field of pictures. Assessment to be ongoing.      COMPREHENSION  Auditory Comprehension: []WFL []Mild   [] Moderate  []Severe  []To be assessed  Impaired Complex/Abstract commands  Comment: Picture ID/word comprehension with 100% accuracy, Yes/no questions (simple to complex) with 90% accuracy    EXPRESSION  Primary Mode of Expression: verbal  (I.e verbal, non-verbal, AAC, etc.)  Verbal Expression: []WFL []Mild   [] Moderate  [x]Severe  []To be assessed  Impaired Initiation  Impaired Repetition  Impaired Automatic Speech  Impaired Confrontational Naming  Anomia   Comment: Attempted to but unable to correctly name simple pictures. Approximations were noted at times, Unable to complete repetition      Pragmatics/Social Functioning: []WFL []Mild   [] Moderate  []Severe  [x]To be assessed     MOTOR SPEECH  Motor Speech: []WFL []Mild   [] Moderate  [x]Severe  []To be assessed   [x]Apraxia   [x]Dysarthria   [x]Decreased Intelligibility    VOICE  Voice: []WFL []Mild   [] Moderate  []Severe  [x]To be further assessed  Weak     COGNITION  [x]Unable to be fully assess secondary to Aphasia.  Utilized yes/no questions for limited assessment     Overall Orientation : []WFL [x]Mild   [] Moderate  []Severe  []To be assessed   []Unable to be assessed secondary to Aphasia   Oriented to self  Oriented to place  Oriented to situation     Attention: []WFL []Mild   [x] Moderate  []Severe  [x]To be assessed  Memory: []WFL []Mild   [] Moderate  []Severe  [x]To be assessed  Problem Solving: []WFL []Mild   [] Moderate  []Severe  [x]To be assessed  Safety/Judgement: []WFL []Mild   [] Moderate  []Severe  [x]To be assessed      Writing:   []WFL [x]Mild   [x] Moderate  []Severe  []To be assessed   Attempted to write name however, decreased legibility   Able to appropriately hold pen     Reading:  []WFL []Mild   [] Moderate  []Severe  [x]To be further assessed  Able to identify single words appropriately   Able to identify short sentences with mod cues for field navigation    Plan of care: 3-5 times a week during acute care stay     Discharge Recommendations:  Recommend ongoing SLP for speech and dysphagia therapy upon discharge from hospital     EDUCATION:   Provided education regarding role of SLP, results of assessment, recommendations and general speech pathology plan of care. [x] Pt verbalized understanding and agreement   [] Pt requires ongoing learning   [] No evidence of comprehension     GOALS:  Short Term Speech/Language/Cognitive Goals:   Pt will improve speech intelligibility in connected speech via graded tasks to 80%   Pt will improve verbal expression for functional expression via graded tasks to 80%  Pt will participate in ongoing cognitive assessment with goals to be established as indicated     If patient discharges prior to next visit, this note will serve as discharge.      Time code minutes:  0 minutes   Total Time:  60 minutes     Electronically signed by:    Sen Keller MA [address] [place] [address]  Speech Language Pathologist

## 2021-12-09 NOTE — PROGRESS NOTES
Physical Therapy    Facility/Department: [address]  Initial Assessment    NAME: Carlene Guardado  : 1945  MRN: [id number]    Date of Service: 2021    Discharge Recommendations:      PT Equipment Recommendations  Equipment Needed: No  Other: Defer to next level of care     Carlene Guardado scored a  on the AM-PAC short mobility form. Current research shows that an AM-PAC score of 17 or less is typically not associated with a discharge to the patient's home setting. Based on the patient's AM-PAC score and their current functional mobility deficits, it is recommended that the patient have 3-5 sessions per week of Physical Therapy at d/c to increase the patient's independence. Please see assessment section for further patient specific details. If patient discharges prior to next session this note will serve as a discharge summary. Please see below for the latest assessment towards goals. Assessment   Body structures, Functions, Activity limitations: Decreased functional mobility ; Decreased strength; Decreased endurance; Decreased posture; Decreased ROM; Decreased balance; Decreased fine motor control; Decreased coordination  Assessment: Pt is a 66 yo male admitted to [facility] for acute stroke. The patient presents with expressive aphasia and mild increased tone on (R) side compared to his baseline. Pt has hx of CVA with (R) sided weakness so it is difficult to differeniate new deficits from old. The patient presents with strong (R) lateral lean while sitting EOB. Recommend additional skilled PT to safely progress independence with functional mobility in order to decrease burden of care.   Treatment Diagnosis: Impaired functional mobility following acute on chronic CVA  Prognosis: Fair; Guarded  Decision Making: Medium Complexity  Exam: ROM, MMT, balance, functional mobility  Clinical Presentation: Stable  PT Education: Goals; Plan of Care; PT Role  Patient Education: Pt indicates understanding via head nod due to expressive aphasia  Barriers to Learning: Expressive aphasia  REQUIRES PT FOLLOW UP: Yes  Activity Tolerance  Activity Tolerance: Patient Tolerated treatment well; Patient limited by fatigue; Patient limited by endurance  Activity Tolerance: Pt reported mild dizziness with standing, resolved with seated rest.       Patient Diagnosis(es): The primary encounter diagnosis was Aphasia. Diagnoses of Troponin level elevated and Hypokalemia were also pertinent to this visit. has a past medical history of Cerebrovascular disease, Hyperlipidemia, Hypertension, Osteoarthritis, Risk for falls, and Unspecified cerebral artery occlusion with cerebral infarction. has a past surgical history that includes hernia repair and Nose surgery. Restrictions  Restrictions/Precautions  Restrictions/Precautions: Fall Risk, Modified Diet (Medium fall risk; NPO)  Required Braces or Orthoses?: No  Position Activity Restriction  Other position/activity restrictions: Per H&P on 12/8 \"76 y.o. male  who presents to the ED complaining of what he says started at 10 PM last night with trouble with speech and communication. He did not get medical attention at the time. This morning around 5 in the morning approximately he tried to get up and go to the bathroom and fell, [facility]'s care staff apparently got him back into bed but may not have noticed his trouble with speech because EMS was not called until later this morning. Patient does report a history of stroke in the past and is on Plavix. He says he felt fine prior to the 10 PM onset last night. He does not have a headache. He denies any injuries from the fall that occurred earlier this morning, specifically no head or neck injury. Denies any fevers or recent illnesses. He does not feel numb or weak acutely in any of his extremities. Does feel a little uncoordinated but not dizzy.    Head CT shows previous dural disease but nothing acute, specifically no bleed. Not a TPA candidate due to timeline of symptoms although his NIH is a 4 and I have a high clinical suspicion for stroke. \"     Vision/Hearing  Vision: Impaired  Vision Exceptions: Wears glasses at all times (pt reports double vision during session)  Hearing: Within functional limits       Subjective  General  Chart Reviewed: Yes  Patient assessed for rehabilitation services?: Yes  Family / Caregiver Present: No  Diagnosis: Acute CVA  Follows Commands: Within Functional Limits  Other (Comment): Pt with difficulty communicating due aphasia. Can consistently respond to yes/no questions. Has communication board but difficult to use due to double vision. General Comment  Comments: Pt semi-reclined in bed upon therapist arrival.  Subjective  Subjective: Pt agreeable to PT/OT eval. Denies pain. Indicates he would like to go back to bed at end of session. Pain Screening  Patient Currently in Pain: Denies  Vital Signs  Patient Currently in Pain: Denies       Orientation  Orientation  Overall Orientation Status: Within Normal Limits     Social/Functional History  Social/Functional History  Type of Home: Facility ([facility])  ADL Assistance: Needs assistance (Total A for lower body dressing, intermittent assist for upper body dressing. Staff assists with sponge baths.)  Ambulation Assistance:  (Does not walk)  Transfer Assistance: Needs assistance (Assist x2 for pivot transfer to w/c)  IADL Comments: Pt gets up to w/c daily, staff pushes him. Sleeps in flat mattress. Additional Comments: One fall the evening prior to admission. Cognition   Cognition  Cognition Comment: hard to formally assess, pt following one step commands throughout and receptive language appeared [facility] throughout, however, significant expressive aphasia noted.  Pt able to shake head \"yes\" and \"No\" to questions asked and answered appropriately    Objective     Observation/Palpation  Observation: (B) LE redness, blanchable, likely due to PVD    AROM RLE (degrees)  RLE AROM: WFL  RLE General AROM: Ankle DF limited to nuetral due to increased tone, no extra range with applied OP  AROM LLE (degrees)  LLE AROM : WFL  Strength RLE  Comment: Pt able to demonstrate full ROM at knee when given increased time formal testing deferred due to extensor tone  Strength LLE  Comment: Pt able to demonstrate full ROM at knee when given increased time formal testing deferred due to extensor tone     Tone RLE  RLE Tone: Hypertonic  Tone Description: extensor tone at knee  Tone LLE  LLE Tone: Hypertonic  Tone Description: extensor tone at knee  Motor Control  Gross Motor?: Exceptions  Comments: Poor finger opposition (B)  Coordination  Rapid Alternating Movements: Normal (with normal speed for toe tapping)  Finger to Nose: Dysmetric (poorly coordinated bilaterally)  Sensation  Overall Sensation Status: WFL     Bed mobility  Supine to Sit: Moderate assistance  Sit to Supine: 2 Person assistance; Dependent/Total (mod(A)x2)  Scooting: Maximal assistance  Comment: HOB elevated, increased time to complete, use of HR     Transfers  Sit to Stand: 2 Person Assistance (Max A + Min A 1st trial; Mod A 2nd trial, pt maintained (B) UE support on therapist's arms)  Stand to sit: 2 Person Assistance (Max A + Min A to control descent)  Comment: 2 standing trials completed at EOB x20 sec each in order to complete pericare. Therapist guarded (R) need due to increased extensor tone. Ambulation  Ambulation?: No (pt does not ambulate at baseline)        Balance  Posture: Fair  Sitting - Static: Fair; -  Sitting - Dynamic: Poor; +  Standing - Static: Poor  Standing - Dynamic: Poor  Comments: Pt sat EOB x15 minutes with frequent (R) UE support on rail and (R) lateral lean, pt able to correct to midline with cues but has diffculty maintaining. Pt requires Mod-CGA initially progressing to Max A with fatigue.  Pt stood 2 x 20 sec with Max A first first trial and forward flexed posture. Pt with improved upright posture on 2nd trial, Mod A. Other activity  See OT note for assist with lower body dressing and pericare. Plan   Plan  Times per week: 3-5x  Current Treatment Recommendations: Strengthening, Functional Mobility Training, Transfer Training, Balance Training, ROM, Neuromuscular Re-education, Safety Education & Training, Patient/Caregiver Education & Training, Equipment Evaluation, Education, & procurement, Wheelchair Mobility Training, Positioning, Endurance Training  Safety Devices  Type of devices:  All fall risk precautions in place, Call light within reach, Gait belt, Nurse notified, Patient at risk for falls, Bed alarm in place, Left in bed    AM-PAC Score  AM-PAC Inpatient Mobility Raw Score : 9 (12/09/21 1142)  AM-PAC Inpatient T-Scale Score : 30.55 (12/09/21 1142)  Mobility Inpatient CMS 0-100% Score: 81.38 (12/09/21 1142)  Mobility Inpatient CMS G-Code Modifier : CM (12/09/21 1142)          Goals  Short term goals  Time Frame for Short term goals: Before discharge  Short term goal 1: Pt will roll left/right with Min A  Short term goal 2: Pt will complete supine<>sit with Min A  Short term goal 3: Pt will sit EOB x10 minutes with no more than Min A  Short term goal 4: Pt will tolerate transfer bed<>chair with Max A x2  Patient Goals   Patient goals : None stated due to expressive aphasia       Therapy Time   Individual Concurrent Group Co-treatment   Time In 1039         Time Out 1120         Minutes 41         Timed Code Treatment Minutes: 24 Minutes       Sapna Webster PT, DPT #[id number]

## 2021-12-09 NOTE — PLAN OF CARE
Problem: Falls - Risk of:  Goal: Will remain free from falls  Outcome: Ongoing     Problem: Skin Integrity:  Goal: Will show no infection signs and symptoms  Outcome: Ongoing

## 2021-12-09 NOTE — PROGRESS NOTES
Patient to room 3384 from ed. Patient alert and oriented. Slurred speech, Expressive aphasia, R sided weakness. Denies pain. Failed swallow screen in ED. Patient oriented to call light. Mepilex placed on R shin skin tear. NSR on monitor. Will continue to monitor. MRI positive for acute infarct.

## 2021-12-09 NOTE — CARE COORDINATION
Discharge Planning Assessment    RN/SW discharge planner met with patient/ (and family member) to discuss reason for admission, current living situation, and potential needs at the time of discharge    Demographics/Insurance verified Yes/[employer] medicare    Current type of dwelling:nursing home  Patient from ECF/SW confirmed with:  Vitor Bradshaw  [phone]         Fax: [phone]            Transportation at the time of discharge: ambulance, 9/24 on the AM-PAC

## 2021-12-09 NOTE — PROGRESS NOTES
Occupational Therapy   Occupational Therapy Initial Assessment  Date: 2021   Patient Name: Belén Sosa  MRN: [id number]     : 1945    Date of Service: 2021    Discharge Recommendations:    Belén Sosa scored a  on the AM-PAC ADL Inpatient form. Current research shows that an AM-PAC score of 17 or less is typically not associated with a discharge to the patient's home setting. Based on the patient's AM-PAC score and their current ADL deficits, it is recommended that the patient have 3-5 sessions per week of Occupational Therapy at d/c to increase the patient's independence. Please see assessment section for further patient specific details. If patient discharges prior to next session this note will serve as a discharge summary. Please see below for the latest assessment towards goals. OT Equipment Recommendations  Equipment Needed: No  Other: defer to next level of care    Assessment   Performance deficits / Impairments: Decreased functional mobility ; Decreased ADL status; Decreased endurance; Decreased strength; Decreased balance; Decreased vision/visual deficit;  Decreased posture; Decreased coordination  Assessment: Pt is currently functioning below occupational baseline and demo the deficits listed above, pt would benefit from continued skilled OT services to address these deficits and increase IND, safety,and ease with all occupational pursuits  Treatment Diagnosis: Decreased ADL status, functional mobility, and functional transfers d/t Acute CVA (cerebrovascular accident) ([facility] [address]  Prognosis: Good; Fair  Decision Making: Medium Complexity  OT Education: OT Role; Plan of Care; Transfer Training  Patient Education: eval- pt verbalized understanding, reinforce as needed  Barriers to Learning: language  REQUIRES OT FOLLOW UP: Yes  Activity Tolerance  Activity Tolerance: Patient Tolerated treatment well; Patient limited by fatigue  Safety Devices  Safety Devices in place: Yes  Type of devices: All fall risk precautions in place; Patient at risk for falls; Bed alarm in place; Call light within reach; Left in bed; Nurse notified           Patient Diagnosis(es): The primary encounter diagnosis was Aphasia. Diagnoses of Troponin level elevated and Hypokalemia were also pertinent to this visit. has a past medical history of Cerebrovascular disease, Hyperlipidemia, Hypertension, Osteoarthritis, Risk for falls, and Unspecified cerebral artery occlusion with cerebral infarction. has a past surgical history that includes hernia repair and Nose surgery. Treatment Diagnosis: Decreased ADL status, functional mobility, and functional transfers d/t Acute CVA (cerebrovascular accident) ([facility] [address]      Restrictions  Restrictions/Precautions  Restrictions/Precautions: Fall Risk, Modified Diet (Medium fall risk; NPO)  Required Braces or Orthoses?: No  Position Activity Restriction  Other position/activity restrictions: Per H&P on 12/8 \"76 y.o. male  who presents to the ED complaining of what he says started at 10 PM last night with trouble with speech and communication. He did not get medical attention at the time. This morning around 5 in the morning approximately he tried to get up and go to the bathroom and fell, [place]'s care staff apparently got him back into bed but may not have noticed his trouble with speech because EMS was not called until later this morning. Patient does report a history of stroke in the past and is on Plavix. He says he felt fine prior to the 10 PM onset last night. He does not have a headache. He denies any injuries from the fall that occurred earlier this morning, specifically no head or neck injury. Denies any fevers or recent illnesses. He does not feel numb or weak acutely in any of his extremities. Does feel a little uncoordinated but not dizzy. Head CT shows previous dural disease but nothing acute, specifically no bleed.   Not a TPA candidate due to timeline of symptoms although his NIH is a 4 and I have a high clinical suspicion for stroke. \"    Subjective   General  Chart Reviewed: Yes  Patient assessed for rehabilitation services?: Yes  Additional Pertinent Hx: PMH: Cerebrovascular disease (2007), Hyperlipidemia, Hypertension, Osteoarthritis, Risk for falls (06/09/2017), and Unspecified cerebral artery occlusion with cerebral infarction. Family / Caregiver Present: No  Referring Practitioner: Loc Velasco MD  Diagnosis: Acute CVA (cerebrovascular accident) ([facility] [address]  Subjective  Subjective: Pt supine in bed upon arrival, agreeable to OT evaluation and treat. presenting with significant expressive aphasia but able to shake head \"yes\" or \"no\"  Patient Currently in Pain: Denies  Vital Signs  Patient Currently in Pain: Denies    Social/Functional History  Social/Functional History  Type of Home: Facility ([facility])  ADL Assistance: Needs assistance (Total A for lower body dressing, intermittent assist for upper body dressing. Staff assists with sponge baths.)  Ambulation Assistance:  (Does not walk)  Transfer Assistance: Needs assistance (Assist x2 for pivot transfer to w/c)  IADL Comments: Pt gets up to w/c daily, staff pushes him. Sleeps in flat mattress. Additional Comments: One fall the evening prior to admission.        Objective   Vision: Impaired  Hearing: Within functional limits    Orientation  Overall Orientation Status: Within Functional Limits (able to shake head \"yes\" or \"no\" to orientation questions)  Observation/Palpation  Observation: (B) LE redness, blanchable, likely due to PVD  Balance  Sitting Balance: Maximum assistance (varied CGA-MOD(A) initially; max(A) at EOS when pt fatigued)  Standing Balance: Maximum assistance (max(A)x1 for static stance EOB, mod(A) x2 trial for static stance EOB)  Standing Balance  Time: 1-2 minutes total  Activity: x2 static stance EOB for ADL completion  Comment: decreased standing tolerance d/t dizziness  Functional Mobility  Functional Mobility Comments: pt does not ambulated at baseline, completes pivots with assistx2 at baseline  ADL  UE Dressing: Maximum assistance  LE Dressing: Dependent/Total (assistx2 for corina care in stance)  Toileting: Dependent/Total (incontinent of urine; assistx2 for clothing management and corina care)  Tone RUE  RUE Tone: Normotonic  Tone LUE  LUE Tone: Normotonic  Coordination  Movements Are Fluid And Coordinated: No  Coordination and Movement description: Right UE; Decreased speed; Decreased accuracy; Left UE  Quality of Movement Other  Comment: decreased coordination in B hands with finger opposition task, decreased speed for all movements  Bed mobility  Supine to Sit: Moderate assistance  Sit to Supine: 2 Person assistance; Dependent/Total (mod(A)x2)  Scooting: Maximal assistance  Comment: HOB elevated, increased time to complete, use of HR  Transfers  Sit to stand: 2 Person assistance; Dependent/Total  Stand to sit: Dependent/Total; 2 Person assistance  Transfer Comments: max(A)x1 + min(A)x1 x2 trials  Vision - Basic Assessment  Vision Comments: Pt pointing to B eyes throughout session when pt asked if vision was blurry pt shook head \"yes\"  Cognition  Cognition Comment: hard to formally assess, pt following one step commands throughout and receptive language appeared [facility] throughout, however, significant expressive aphasia noted.  Pt able to shake head \"yes\" and \"No\" to questions asked and answered appropriately  Perception  Overall Perceptual Status: Impaired  Unilateral Attention: Cues to maintain midline in sitting (R lateral lean)  Initiation: Cues to initiate tasks  Sensation  Overall Sensation Status: WFL  LUE AROM (degrees)  LUE AROM : WFL  Left Hand AROM (degrees)  Left Hand AROM: WFL  RUE AROM (degrees)  RUE AROM : WFL  Right Hand AROM (degrees)  Right Hand AROM: WFL          Plan   Plan  Times per week: 5-7x/wk  Times per day: Daily  Current Treatment Recommendations: Strengthening, Balance Training, Functional Mobility Training, Endurance Training, Equipment Evaluation, Education, & procurement, Patient/Caregiver Education & Training, Self-Care / ADL, Safety Education & Training, Neuromuscular Re-education    G-Code     OutComes Score                                                  AM-PAC Score        AM-PAC Inpatient Daily Activity Raw Score: 9 (12/09/21 1256)  AM-PAC Inpatient ADL T-Scale Score : 25.33 (12/09/21 1256)  ADL Inpatient CMS 0-100% Score: 79.59 (12/09/21 1256)  ADL Inpatient CMS G-Code Modifier : CL (12/09/21 1256)    Goals  Short term goals  Time Frame for Short term goals: d/c  Short term goal 1: Pt will complete functional transfer to ADL surface with max(A)x2  Short term goal 2: Pt will improve seated balance to CGA while completing ADL task  Short term goal 3: Pt will completed bed mobility with min(A)  Short term goal 4: pt will complete UB ADLs with setup  Long term goals  Time Frame for Long term goals : LTG=STG  Patient Goals   Patient goals : pt did not state       Therapy Time   Individual Concurrent Group Co-treatment   Time In 1039         Time Out 1120         Minutes 41         Timed Code Treatment Minutes: [address] [facility] [address], [address] [place] [id number]

## 2021-12-09 NOTE — PROGRESS NOTES
Progress Note - Dr. Tracy Boss - Internal Medicine  PCP: OLGA Bradford - CNP [address] [phone]    Hospital Day: 1  Code Status: Full Code  Current Diet: Diet NPO        CC: follow up on medical issues    Subjective:   Linh Watson is a 67 y.o. male. Pt seen and examined  Chart reviewed since last visit, labs and imaging below        No new sx    MRI reviewed -    Impression:       Acute lacunar infarct within the right mid corona radiata. Likely subacute lacunar infarct within the contralateral mid corona radiata. Awaiting pt/ot eval  Awaiting neuro eval  ECHO pending      He denies chest pain, denies shortness of breath, denies nausea,  denies emesis. 10 system Review of Systems is reviewed with patient, and pertinent positives are noted in HPI above . Otherwise, Review of systems is negative. I have reviewed the patient's medical and social history in detail and updated the computerized patient record. To recap: He  has a past medical history of Cerebrovascular disease, Hyperlipidemia, Hypertension, Osteoarthritis, Risk for falls, and Unspecified cerebral artery occlusion with cerebral infarction. . He  has a past surgical history that includes hernia repair and Nose surgery. Avel Sidhu He  reports that he has never smoked. He has never used smokeless tobacco. He reports that he does not drink alcohol and does not use drugs. .        Active Hospital Problems    Diagnosis Date Noted    [facility] [I63.9] 12/08/2021    Gastroesophageal reflux disease without esophagitis [K21.9] 07/12/2016    Hyperlipidemia [E78.5] 12/16/2014    Hypertension [I10] 11/08/2012       Current Facility-Administered Medications: iopamidol (ISOVUE-370) 76 % injection 75 mL, 75 mL, IntraVENous, ONCE PRN  amLODIPine (NORVASC) tablet 10 mg, 10 mg, Oral, Daily  vitamin B-12 (CYANOCOBALAMIN) tablet 1,000 mcg, 1,000 mcg, Oral, Daily  senna (SENOKOT) tablet 17.2 (1.676 m) 167 lb 8.8 oz (76 kg)   12/08/21 1930 (!) 149/108   83 18      12/08/21 1915 (!) 109/93   86 15      12/08/21 1900 (!) 135/100   87 11      12/08/21 1845 (!) 191/114   88 18      12/08/21 1830 (!) 197/128   91 17      12/08/21 1815 (!) 178/90   88 9      12/08/21 1800 (!) 177/115   89 15      12/08/21 1745 (!) 175/110   89 17      12/08/21 1730 (!) 143/113   77 8      12/08/21 1715 (!) 177/103   85 15      12/08/21 1645 (!) 164/117   85 23 94 %     12/08/21 1630 (!) 147/122   82 14 94 %     12/08/21 1615 (!) 159/97   76 15 93 %     12/08/21 1540 (!) 174/107   85 16 96 %     12/08/21 1515 (!) 170/99   81 17 94 %     12/08/21 1415 (!) 152/117   74 14 94 %     12/08/21 1355    88 13 96 %     12/08/21 1345 (!) 145/121     94 %     12/08/21 1330 (!) 147/110     95 %     12/08/21 1245 (!) 177/106     97 %     12/08/21 1230 (!) 157/97     91 %     12/08/21 1214 (!) 161/95 98.1 °F (36.7 °C) Oral 82 16 96 % 5' 6\" (1.676 m) 178 lb (80.7 kg)     Patient Vitals for the past 96 hrs (Last 3 readings):   Weight   12/08/21 2132 167 lb 8.8 oz (76 kg)   12/08/21 1214 178 lb (80.7 kg)         No intake or output data in the 24 hours ending 12/09/21 1045      Physical Exam:   Vitals as above  General appearance: alert, appears stated age and cooperative    Head: Normocephalic, without obvious abnormality, atraumatic    Lungs: clear to auscultation bilaterally    Heart: regular rate and rhythm, S1, S2 normal, no murmur    Abdomen: soft, non-tender; bowel sounds normal; no masses, no organomegaly    Extremities: extremities normal, atraumatic, no cyanosis, no edema      Labs:  Lab Results   Component Value Date    WBC 5.7 12/09/2021    HGB 16.7 12/09/2021    HCT 48.6 12/09/2021     (L) 12/09/2021    CHOL 104 01/04/2021    TRIG 96 01/04/2021    HDL 43 01/04/2021    ALT 12 12/09/2021    AST 19 12/09/2021     12/09/2021    K 3.3 (L) 12/09/2021     12/09/2021    CREATININE 1.2 12/09/2021    BUN 17 12/09/2021    CO2 21 12/09/2021    TSH 3.98 02/26/2015    PSA 1.68 01/29/2016    INR 0.96 12/08/2021    LABA1C 5.7 06/26/2020    LABMICR Not Indicated 01/11/2021     Lab Results   Component Value Date    BBGCEIS 872 (H) 12/08/2021    TROPONINI 0.03 (H) 12/08/2021       Recent Imaging Results are Reviewed:  CT HEAD WO CONTRAST    Result Date: 12/8/2021  EXAMINATION: CT OF THE HEAD WITHOUT CONTRAST  12/8/2021 11:48 am TECHNIQUE: CT of the head was performed without the administration of intravenous contrast. Dose modulation, iterative reconstruction, and/or weight based adjustment of the mA/kV was utilized to reduce the radiation dose to as low as reasonably achievable. COMPARISON: Head CT 07/08/2021 HISTORY: ORDERING SYSTEM PROVIDED HISTORY: speech trouble TECHNOLOGIST PROVIDED HISTORY: Reason for exam:-> speech trouble Has a \"code stroke\" or \"stroke alert\" been called?-> yes Decision Support Exception - unselect if not a suspected or confirmed emergency medical condition->Emergency Medical Condition (MA) FINDINGS: BRAIN/VENTRICLES: There is no acute intracranial hemorrhage, mass effect or midline shift. No abnormal extra-axial fluid collection. The gray-white differentiation is maintained without evidence of an acute infarct. There is prominence of the ventricles and sulci due to global parenchymal volume loss. There are nonspecific areas of hypoattenuation within the periventricular and subcortical white matter, which likely represent chronic microvascular ischemic change. Remote lacunar stroke bilateral thalamus and left caudate lobe. ORBITS: The visualized portion of the orbits demonstrate no acute abnormality. SINUSES: The visualized paranasal sinuses and mastoid air cells demonstrate no acute abnormality. SOFT TISSUES/SKULL: No acute abnormality of the visualized skull or soft tissues.      1. No acute intracranial abnormality. 2. Remote lacunar stroke bilateral thalamus and left caudate lobe. 3. Senescent changes. 4.  White matter hypoattenuation described is typical of microvascular ischemic disease or as sequela of dysmyelinating/demyelinating processes. Per stroke alert protocol, the at Dr. Vega [facility] radiology any Alferd Douse results were called by Dr. Nhi Huddleston to [facility] on 12/8/2021 at 12:13. CT CERVICAL SPINE WO CONTRAST    Result Date: 12/8/2021  EXAMINATION: CT OF THE CERVICAL SPINE WITHOUT CONTRAST 12/8/2021 11:48 am TECHNIQUE: CT of the cervical spine was performed without the administration of intravenous contrast. Multiplanar reformatted images are provided for review. Dose modulation, iterative reconstruction, and/or weight based adjustment of the mA/kV was utilized to reduce the radiation dose to as low as reasonably achievable. COMPARISON: 07/08/2021 HISTORY: ORDERING SYSTEM PROVIDED HISTORY: stroke alert, fall TECHNOLOGIST PROVIDED HISTORY: Reason for exam:->stroke alert, fall Decision Support Exception - unselect if not a suspected or confirmed emergency medical condition->Emergency Medical Condition (MA) FINDINGS: BONES/ALIGNMENT: There is no acute fracture or traumatic malalignment. DEGENERATIVE CHANGES: Multilevel degenerative changes. SOFT TISSUES: There is no prevertebral soft tissue swelling. No acute abnormality of the cervical spine. XR CHEST PORTABLE    Result Date: 12/8/2021  EXAMINATION: ONE XRAY VIEW OF THE CHEST 12/8/2021 12:25 pm COMPARISON: Chest radiograph 4-7-2019 HISTORY: ORDERING SYSTEM PROVIDED HISTORY: dizziness TECHNOLOGIST PROVIDED HISTORY: Reason for exam:->dizziness FINDINGS: Lung volumes are low. No pulmonary consolidation, effusion or pneumothorax. There is borderline cardiac enlargement. Mild tortuosity of the thoracic aorta. 1. Borderline cardiomegaly. 2.   No evidence of pulmonary edema.      CTA HEAD NECK W CONTRAST    Result Date: 12/8/2021  EXAMINATION: CTA OF THE HEAD AND NECK WITH CONTRAST 12/8/2021 11:48 am: TECHNIQUE: CTA of the head and neck was performed with the administration of intravenous contrast. Multiplanar reformatted images are provided for review. MIP images are provided for review. Stenosis of the internal carotid arteries measured using NASCET criteria. Dose modulation, iterative reconstruction, and/or weight based adjustment of the mA/kV was utilized to reduce the radiation dose to as low as reasonably achievable. COMPARISON: Noncontrast CT brain 12/08/2021 HISTORY: ORDERING SYSTEM PROVIDED HISTORY: stroke like sx TECHNOLOGIST PROVIDED HISTORY: Reason for exam:->stroke like sx Decision Support Exception - unselect if not a suspected or confirmed emergency medical condition->Emergency Medical Condition (MA) FINDINGS: CTA NECK: AORTIC ARCH/ARCH VESSELS: Mild calcified atherosclerotic plaque is present within the aortic arch. The origins of the great vessels are normal.  There is no significant stenosis of the innominate or subclavian arteries. CAROTID ARTERIES: The common carotid arteries are normal in appearance. There is no significant stenosis or dissection. There is calcified and noncalcified atherosclerotic plaque at the origins of the internal carotid arteries. There is a 50% stenosis within the proximal right internal carotid artery based on NASCET criteria. There is noncalcified atherosclerotic plaque extending into the lumen of the right internal carotid artery. There is no significant stenosis of the left internal carotid artery evident based on NASCET criteria. VERTEBRAL ARTERIES: There is a mild stenosis at the origin of the right vertebral artery. The vertebral arteries are otherwise unremarkable. SOFT TISSUES: In the lung apices are clear. There is no pathologically enlarged lymphadenopathy identified. There is no soft tissue mass evident.  The parotid glands and submandibular glands are normal. BONES: No lytic or blastic osseous lesions are identified. 3D surface reformations were performed and concur with the above findings. CTA HEAD: ANTERIOR CIRCULATION: Atherosclerotic calcifications are present within the cavernous segments of the internal carotid arteries. There is no significant stenosis or aneurysm evident. The anterior and middle cerebral arteries are normal.  No significant stenosis or aneurysm is identified. POSTERIOR CIRCULATION: The distal vertebral arteries are normal.  The basilar artery is normal.  There is no significant stenosis or aneurysm identified. There is a fetal origin of the right posterior cerebral artery, an anatomic variant. The left posterior cerebral artery is normal. OTHER: No dural venous sinus thrombosis on this non-dedicated study. BRAIN: There is no mass effect or midline shift. There is no vascular malformation identified. 3D surface reformations were performed and concur with the above findings. This scan was analyzed using [employer]. [employer] contact LVO. Identification of suspected findings is not for diagnostic use beyond notification. [employer] LVO is limited to analysis of imaging data and should not be used in-lieu of full patient evaluation or relied upon to make or confirm diagnosis. 1. No large vessel occlusion, significant stenosis or cerebral aneurysm identified. 2. Calcified and noncalcified atherosclerotic plaque at the origin of the right internal carotid artery resulting in a 50% stenosis based on NASCET criteria. There is noncalcified atherosclerotic plaque extending into the lumen of the right internal carotid artery which could predispose to distal emboli. 3. Mild stenosis at the origin of the right vertebral artery. MRI brain without contrast    Result Date: 12/8/2021  EXAMINATION: MRI OF THE BRAIN WITHOUT CONTRAST  12/8/2021 7:45 pm TECHNIQUE: Multiplanar multisequence MRI of the brain was performed without the administration of intravenous contrast. COMPARISON: None.  HISTORY: ORDERING SYSTEM PROVIDED HISTORY: TIA TECHNOLOGIST PROVIDED HISTORY: Reason for exam:->TIA Reason for Exam: started at 10 PM last night with trouble with speech and communication. FINDINGS: INTRACRANIAL STRUCTURES/VENTRICLES: There is volume loss with mild chronic white matter microvascular ischemic change. Multiple remote lacunar infarcts are present within the deep gray matter of both cerebral hemispheres. There is a chronic lacunar infarct within the cerebellum, on the left hand side. Normal expected signal voids are present within the vessels at the base of skull. No acute intracranial mass, shift, or bleed is identified. There is an acute lacunar infarct within the right mid corona radiata as well as a probable subacute lacunar infarct within the left mid corona radiata. ORBITS: The visualized portion of the orbits demonstrate no acute abnormality. SINUSES: There is mucosal thickening of the right maxillary antrum. BONES/SOFT TISSUES: The bone marrow signal intensity appears normal. The soft tissues demonstrate no acute abnormality. Acute lacunar infarct within the right mid corona radiata. Likely subacute lacunar infarct within the contralateral mid corona radiata. The findings were sent to the Radiology Results [address] at 8:53 pm on 12/8/2021to be communicated to a licensed caregiver. Lab Results   Component Value Date    GLUCOSE 111 12/09/2021    GLUCOSE 102 05/09/2017     Lab Results   Component Value Date    POCGLU 120 12/08/2021     BP (!) 167/99   Pulse 73   Temp 97.6 °F (36.4 °C) (Axillary)   Resp 18   Ht 5' 6\" (1.676 m)   Wt 167 lb 8.8 oz (76 kg)   SpO2 96%   BMI 27.04 kg/m²     Assessment and Plan:  Principal Problem:    Acute CVA (cerebrovascular accident) ([address] -Established problem. Stable. Seen on MRI  Plan: pt/ot eval pending, neuro eval pending, ECHO ordered. Cont plavix  Active Problems:    Hypertension -Established problem. Uncontrolled.  167/99  Plan: see if

## 2021-12-09 NOTE — PROGRESS NOTES
In patient Neurology consult        [facility] Neurology      MD Sadia Mcgregor  1945    Date of Service: 12/9/2021    Referring Physician: Jennie Hanley MD    Most of the history was obtained from detailed chart reviewing and discussion with the patient's therapist as the patient is currently aphasic and unable to provide me with accurate history. Reason for the consult and CC: Acute aphasia and right-sided weakness. New stroke    HPI:   The patient is a 67y.o.  years old male with history of hypertension, hyperlipidemia and prior stroke who was admitted to the hospital last night from ED with acute speech impairment and right-sided weakness. Symptoms started few hours prior to admission. Description was sudden onset of difficulty speaking and following direction followed by right-sided weakness while going to the bathroom. Degree was severe. Duration was persistent. No falling or injury. No other relieving or aggravating factors. No clear triggers. History of strokes on Plavix. Patient came to the ED for evaluation. Initial imaging showed no acute stroke or large vessel occlusion. He was admitted to the hospital.  Further work-up with MRI brain from last night showed acute right subcortical lacunar strokes. The patient is currently aphasic and able to follow simple direction. Other review of system was limited.     Family History   Problem Relation Age of Onset    Heart Disease Father 64    Diabetes Mother     Heart Disease Mother     High Blood Pressure Mother     Breast Cancer Maternal Grandmother     Breast Cancer Sister          Past Medical History:   Diagnosis Date    Cerebrovascular disease 2007    Right leg weakness    Hyperlipidemia     Hypertension     Osteoarthritis     Risk for falls 06/09/2017    YO 18/56    Unspecified cerebral artery occlusion with cerebral infarction      Past Surgical History:   Procedure Laterality Date    HERNIA REPAIR  NOSE SURGERY       Social History     Tobacco Use    Smoking status: Never Smoker    Smokeless tobacco: Never Used   Vaping Use    Vaping Use: Never used   Substance Use Topics    Alcohol use: No     Alcohol/week: 0.0 standard drinks    Drug use: No     No Known Allergies  Current Facility-Administered Medications   Medication Dose Route Frequency Provider Last Rate Last Admin    perflutren lipid microspheres (DEFINITY) injection 1.65 mg  1.5 mL IntraVENous ONCE PRN Ivet Walker MD        iopamidol (ISOVUE-370) 76 % injection 75 mL  75 mL IntraVENous ONCE PRN Juan Jimenes MD        amLODIPine (NORVASC) tablet 10 mg  10 mg Oral Daily Ivet Walker MD        vitamin B-12 (CYANOCOBALAMIN) tablet 1,000 mcg  1,000 mcg Oral Daily Ivet Walker MD        [facility]) tablet 17.2 mg  2 tablet Oral Daily Ivet Walker MD        lisinopril (PRINIVIL;ZESTRIL) tablet 40 mg  40 mg Oral Daily Ivet Walker MD        clopidogrel (PLAVIX) tablet 75 mg  75 mg Oral Daily Ivet Walker MD        pantoprazole (PROTONIX) tablet 40 mg  40 mg Oral Daily Ivet Walker MD        atorvastatin (LIPITOR) tablet 20 mg  20 mg Oral Nightly Ivet Walker MD        sodium chloride flush 0.9 % injection 10 mL  10 mL IntraVENous 2 times per day Ivet Walker MD        sodium chloride flush 0.9 % injection 10 mL  10 mL IntraVENous PRN Ivet Walker MD        0.9 % sodium chloride infusion  25 mL IntraVENous PRN Ivet Walker MD        ondansetron (ZOFRAN-ODT) disintegrating tablet 4 mg  4 mg Oral Q8H PRN Ivet Walker MD        Or    ondansetron [facility]) injection 4 mg  4 mg IntraVENous Q6H PRN Ivet Walker MD        magnesium hydroxide (MILK OF MAGNESIA) 400 MG/5ML suspension 30 mL  30 mL Oral Daily PRN Ivet Walker MD        enoxaparin (LOVENOX) injection 40 mg  40 mg SubCUTAneous Daily Ivet Walker MD        aspirin EC tablet 81 mg  81 mg Oral Daily Rita Bucio Tracy Boss MD        Or    aspirin suppository 300 mg  300 mg Rectal Daily Luciano Gonzalez MD        labetalol (NORMODYNE;TRANDATE) injection 10 mg  10 mg IntraVENous Q10 Min PRN Luciano Gonzalez MD        hydrALAZINE (APRESOLINE) injection 10 mg  10 mg IntraVENous Q6H PRN Luciano Gonzalez MD   10 mg at 12/08/21 1851    0.9 % sodium chloride bolus  500 mL IntraVENous PRN Luciano Gonzalez MD        potassium chloride Ana M Wilfredo M) extended release tablet 40 mEq  40 mEq Oral PRN Luciano Gonzalez MD        Or    potassium bicarb-citric acid (EFFER-K) effervescent tablet 40 mEq  40 mEq Oral PRN Luciano Gonzalez MD        Or    potassium chloride 10 mEq/100 mL IVPB (Peripheral Line)  10 mEq IntraVENous PRN Luciano Gonzalez MD           ROS: 10-14 system review was limited due to MS changes or as per HPI. Constitutional:   Vitals:    12/09/21 0507 12/09/21 0715 12/09/21 0732 12/09/21 1300   BP: (!) 139/92 (!) 174/115 (!) 167/99 128/84   Pulse: 84 73  77   Resp: 18 18  18   Temp: 96.8 °F (36 °C) 97.6 °F (36.4 °C)  98.1 °F (36.7 °C)   TempSrc: Axillary Axillary  Oral   SpO2: 95% 96%  92%   Weight:       Height:           General appearance: Aphasic  Eye: Fundus of the eye: Optic disc is difficult to obtain due to poor cooperation from the patient  Neck: supple  Cardiovascular: No lower leg edema with good pulsation. Mental Status:   AAO times one, himself  Good attention but nonfluent  Unable to assess memory or fund of knowledge due to aphasia  language, nonfluent aphasia. Able to follow direction. Cranial Nerves:   II:  Pupils: equal, round, reactive to light  III,IV,VI: Extra Ocular Movements are intact.  No nystagmus  V: Facial sensation : not tested due to aphasia  VII: Facial strength and movements: Right facial asymmetry  VIII: Hearing: can track my voice  IX: Palate elevation: Symmetric  XI: Shoulder shrug: Symmetric  XII: Tongue movements: midline  Musculoskeletal:  The patient can move left side was generated by voice recognition computer software.  Although all attempts are made to edit the dictation for accuracy, there may be errors in the  transcription that are not intended

## 2021-12-09 NOTE — DISCHARGE INSTR - COC
Continuity of Care Form    Patient Name: Florencio Arguello   :  1945  MRN:  [id number]    Rosy Hooks date:  2021  Discharge date:  21      Code Status Order: Full Code   Advance Directives:      Admitting Physician:  Lukas Davey MD  PCP: Mandy Kulkarni APRN - CNP    Discharging Nurse:  [address]/Room#: 2CL-2592/8245-30  Discharging Unit Phone Number: [phone]    Emergency Contact:   Extended Emergency Contact Information  Primary Emergency Contact: Toro Ceballos, [address]  Home Phone: [phone]  Mobile Phone: [phone]  Relation: Brother/Sister    Past Surgical History:  Past Surgical History:   Procedure Laterality Date    HERNIA REPAIR      NOSE SURGERY         Immunization History:   Immunization History   Administered Date(s) Administered    COVID-19, Pfizer, PF, 30mcg/0.3mL 2021, 2021    Influenza Vaccine, unspecified formulation 10/21/2009, 2012, 2013, 2014, 2015, 01/10/2017, 2018    Influenza, High Dose (Fluzone 65 yrs and older) 10/18/2011, 2012, 2012, 2013, 2014, 2015, 01/10/2017, 09/10/2020    Influenza, Triv, inactivated, subunit, adjuvanted, IM (Fluad 65 yrs and older) 2018    Pneumococcal Conjugate 13-valent ([id number]) 2014    Pneumococcal Polysaccharide ([id number]) 2007, 2016    Tdap (Boostrix, Adacel) 2011       Active Problems:  Patient Active Problem List   Diagnosis Code    HTN (hypertension), benign I10    Cerebrovascular disease I67.9    DDD (degenerative disc disease), lumbosacral M51.37    Displacement of lumbar intervertebral disc without myelopathy M51.26    Dyslipidemia E78.5    Carotid artery stenosis I65.29    Conductive hearing loss of both ears H90.0    Thrombocytopenia (HCC) D69.6    Acute kidney injury ([address] N17.9    Gastroesophageal reflux disease without esophagitis K21.9    Right hemiparesis ([facility] G81.91    Ataxia R27.0    Hemiparesis due to old lacunar stroke [facility]) I69.359    Vertigo, benign paroxysmal H81.10    CAD in native artery I25.10    Age-related osteoporosis without current pathological fracture M81.0    Hyperglycemia R73.9    Chest pain R07.9    Chronic pain of left upper extremity M79.602, G89.29    Venous insufficiency of both lower extremities I87.2    PVD (peripheral vascular disease) ([facility]) I73.9    [facility] accident) [facility] I63.9    Aphasia R47.01       Isolation/Infection:   Isolation            No Isolation          Patient Infection Status       None to display            Nurse Assessment:  Last Vital Signs: /84   Pulse 77   Temp 98.1 °F (36.7 °C) (Oral)   Resp 18   Ht 5' 6\" (1.676 m)   Wt 167 lb 8.8 oz (76 kg)   SpO2 92%   BMI 27.04 kg/m²     Last documented pain score (0-10 scale): Pain Level: 0  Last Weight:   Wt Readings from Last 1 Encounters:   12/08/21 167 lb 8.8 oz (76 kg)     Mental Status:  oriented, alert, coherent, logical, thought processes intact, able to concentrate and follow conversation, and aphasia    IV Access:  - None    Nursing Mobility/ADLs:  Walking   Dependent  Transfer  Assisted  Bathing  Assisted  Dressing  Assisted  Toileting  Assisted  Feeding  Assisted  Med Admin  Assisted  Med Delivery   crushed and prefers mixed with applesauce    Wound Care Documentation and Therapy:        Elimination:  Continence: Bowel: No  Bladder: No  Urinary Catheter: None   Colostomy/Ileostomy/Ileal Conduit: {YES / XC:37264}       Date of Last BM: 12/11/21  No intake or output data in the 24 hours ending 12/09/21 1541  No intake/output data recorded. Safety Concerns:      At Risk for Falls and Aspiration Risk    Impairments/Disabilities:      Speech and Right sided weakness    Nutrition Therapy:  Current Nutrition Therapy:   - Oral Diet:  Dysphagia 1 pureed    Routes of Feeding: Oral  Liquids: Honey Thick Liquids  Daily Fluid Restriction: no  Last Modified Barium Swallow with Video (Video Swallowing Test): done on 2021    Treatments at the Time of Hospital Discharge:   Respiratory Treatments: n/a  Oxygen Therapy:  is not on home oxygen therapy. Ventilator:    - No ventilator support    Rehab Therapies:   Weight Bearing Status/Restrictions: No weight bearing restirctions  Other Medical Equipment (for information only, NOT a DME order):  wheelchair, bath bench, bedside commode, and hospital bed  Other Treatments: n/a    Patient's personal belongings (please select all that are sent with patient):  Glasses    RN SIGNATURE:  Electronically signed by Yovani Jernigan RN on 21 at 2:23 PM EST    CASE MANAGEMENT/SOCIAL WORK SECTION    Inpatient Status Date: ***    Readmission Risk Assessment Score:  Readmission Risk              Risk of Unplanned Readmission:  13           Discharging to Facility/ Agency   Name:  [facility]  Address:  Phone:  Fax:    Dialysis Facility (if applicable)     / signature: Electronically signed by MENDEZ Curtis on 21 at 3:41 PM EST    PHYSICIAN SECTION    Prognosis: {Prognosis:5870631594}    Condition at Discharge: [facility] Patient Condition:[id number]}    Rehab Potential (if transferring to Rehab): {Prognosis:9975222301}    Recommended Labs or Other Treatments After Discharge: ***    Physician Certification: I certify the above information and transfer of Stacey Torres  is necessary for the continuing treatment of the diagnosis listed and that he requires {Admit to Appropriate Level of Care:59385} for {GREATER/LESS:867250869} 30 days.      Update Admission H&P: {CHP DME Changes in ENDS}    PHYSICIAN SIGNATURE:  {Esignature:151726542}

## 2021-12-10 LAB
ANION GAP SERPL CALCULATED.3IONS-SCNC: 13 MMOL/L (ref 3–16)
BASOPHILS ABSOLUTE: 0 K/UL (ref 0–0.2)
BASOPHILS RELATIVE PERCENT: 0.5 %
BUN BLDV-MCNC: 23 MG/DL (ref 7–20)
CALCIUM SERPL-MCNC: 9.7 MG/DL (ref 8.3–10.6)
CHLORIDE BLD-SCNC: 109 MMOL/L (ref 99–110)
CO2: 22 MMOL/L (ref 21–32)
CREAT SERPL-MCNC: 1.1 MG/DL (ref 0.8–1.3)
EKG ATRIAL RATE: 87 BPM
EKG DIAGNOSIS: NORMAL
EKG P AXIS: 106 DEGREES
EKG P-R INTERVAL: 178 MS
EKG Q-T INTERVAL: 392 MS
EKG QRS DURATION: 84 MS
EKG QTC CALCULATION (BAZETT): 471 MS
EKG R AXIS: -3 DEGREES
EKG T AXIS: 11 DEGREES
EKG VENTRICULAR RATE: 87 BPM
EOSINOPHILS ABSOLUTE: 0.1 K/UL (ref 0–0.6)
EOSINOPHILS RELATIVE PERCENT: 1.2 %
GFR AFRICAN AMERICAN: >60
GFR NON-AFRICAN AMERICAN: >60
GLUCOSE BLD-MCNC: 94 MG/DL (ref 70–99)
HCT VFR BLD CALC: 48.5 % (ref 40.5–52.5)
HEMOGLOBIN: 16.3 G/DL (ref 13.5–17.5)
LYMPHOCYTES ABSOLUTE: 1.1 K/UL (ref 1–5.1)
LYMPHOCYTES RELATIVE PERCENT: 20.1 %
MCH RBC QN AUTO: 31.4 PG (ref 26–34)
MCHC RBC AUTO-ENTMCNC: 33.5 G/DL (ref 31–36)
MCV RBC AUTO: 93.5 FL (ref 80–100)
MONOCYTES ABSOLUTE: 0.7 K/UL (ref 0–1.3)
MONOCYTES RELATIVE PERCENT: 13.1 %
NEUTROPHILS ABSOLUTE: 3.4 K/UL (ref 1.7–7.7)
NEUTROPHILS RELATIVE PERCENT: 65.1 %
PDW BLD-RTO: 14 % (ref 12.4–15.4)
PLATELET # BLD: 101 K/UL (ref 135–450)
PMV BLD AUTO: 8.5 FL (ref 5–10.5)
POTASSIUM SERPL-SCNC: 3.4 MMOL/L (ref 3.5–5.1)
RBC # BLD: 5.19 M/UL (ref 4.2–5.9)
SODIUM BLD-SCNC: 144 MMOL/L (ref 136–145)
WBC # BLD: 5.3 K/UL (ref 4–11)

## 2021-12-10 PROCEDURE — 80048 BASIC METABOLIC PNL TOTAL CA: CPT

## 2021-12-10 PROCEDURE — 6360000002 HC RX W HCPCS: Performed by: INTERNAL MEDICINE

## 2021-12-10 PROCEDURE — 2060000000 HC ICU INTERMEDIATE R&B

## 2021-12-10 PROCEDURE — 36415 COLL VENOUS BLD VENIPUNCTURE: CPT

## 2021-12-10 PROCEDURE — 2580000003 HC RX 258: Performed by: INTERNAL MEDICINE

## 2021-12-10 PROCEDURE — 2500000003 HC RX 250 WO HCPCS: Performed by: INTERNAL MEDICINE

## 2021-12-10 PROCEDURE — 97129 THER IVNTJ 1ST 15 MIN: CPT

## 2021-12-10 PROCEDURE — 92507 TX SP LANG VOICE COMM INDIV: CPT

## 2021-12-10 PROCEDURE — 97112 NEUROMUSCULAR REEDUCATION: CPT

## 2021-12-10 PROCEDURE — 92526 ORAL FUNCTION THERAPY: CPT

## 2021-12-10 PROCEDURE — 97530 THERAPEUTIC ACTIVITIES: CPT

## 2021-12-10 PROCEDURE — 93010 ELECTROCARDIOGRAM REPORT: CPT | Performed by: INTERNAL MEDICINE

## 2021-12-10 PROCEDURE — 97535 SELF CARE MNGMENT TRAINING: CPT

## 2021-12-10 PROCEDURE — 99233 SBSQ HOSP IP/OBS HIGH 50: CPT | Performed by: PSYCHIATRY & NEUROLOGY

## 2021-12-10 PROCEDURE — 85025 COMPLETE CBC W/AUTO DIFF WBC: CPT

## 2021-12-10 RX ADMIN — LABETALOL HYDROCHLORIDE 10 MG: 5 INJECTION INTRAVENOUS at 08:13

## 2021-12-10 RX ADMIN — Medication 10 ML: at 09:00

## 2021-12-10 RX ADMIN — HYDRALAZINE HYDROCHLORIDE 10 MG: 20 INJECTION INTRAMUSCULAR; INTRAVENOUS at 16:20

## 2021-12-10 ASSESSMENT — PAIN SCALES - WONG BAKER
WONGBAKER_NUMERICALRESPONSE: 0

## 2021-12-10 ASSESSMENT — PAIN SCALES - GENERAL: PAINLEVEL_OUTOF10: 0

## 2021-12-10 NOTE — PROGRESS NOTES
Alan Giraldo  Neurology Follow-up  [facility] Neurology    Date of Service: 12/10/2021    Subjective:   CC: Follow up today regarding: Acute aphasia and new stroke    Events noted. Chart and lab reviewed. The patient is about the same. More nonfluent aphasia with bulbar weakness. The same right-sided weakness. Unable to give any more history. Other review of system was limited due to aphasia. family history includes Breast Cancer in his maternal grandmother and sister; Diabetes in his mother; Heart Disease in his mother; Heart Disease (age of onset: 64) in his father; High Blood Pressure in his mother.     Past Medical History:   Diagnosis Date    Cerebrovascular disease 2007    Right leg weakness    Hyperlipidemia     Hypertension     Osteoarthritis     Risk for falls 06/09/2017    YO 18/55    Unspecified cerebral artery occlusion with cerebral infarction      Current Facility-Administered Medications   Medication Dose Route Frequency Provider Last Rate Last Admin    perflutren lipid microspheres (DEFINITY) injection 1.65 mg  1.5 mL IntraVENous ONCE PRN Agus Rice MD        iopamidol (ISOVUE-370) 76 % injection 75 mL  75 mL IntraVENous ONCE PRN Martinez Davey MD        amLODIPine (NORVASC) tablet 10 mg  10 mg Oral Daily Agus Rice MD        vitamin B-12 (CYANOCOBALAMIN) tablet 1,000 mcg  1,000 mcg Oral Daily Agus Rice MD        [facility]) tablet 17.2 mg  2 tablet Oral Daily Agus Rice MD        lisinopril (PRINIVIL;ZESTRIL) tablet 40 mg  40 mg Oral Daily Agus Rice MD        clopidogrel (PLAVIX) tablet 75 mg  75 mg Oral Daily Agus Rice MD        pantoprazole (PROTONIX) tablet 40 mg  40 mg Oral Daily Agus Rice MD        atorvastatin (LIPITOR) tablet 20 mg  20 mg Oral Nightly Agus Rice MD        sodium chloride flush 0.9 % injection 10 mL  10 mL IntraVENous 2 times per day Agus Rice MD   10 mL at 12/10/21 0900    sodium chloride flush 0.9 % injection 10 mL  10 mL IntraVENous PRN Alexia Carter MD        0.9 % sodium chloride infusion  25 mL IntraVENous PRN Alexia Carter MD        ondansetron (ZOFRAN-ODT) disintegrating tablet 4 mg  4 mg Oral Q8H PRN Alexia Carter MD        Or    [facility] injection 4 mg  4 mg IntraVENous Q6H PRN Alexia Carter MD        magnesium hydroxide (MILK OF MAGNESIA) 400 MG/5ML suspension 30 mL  30 mL Oral Daily PRN Alexia Carter MD        enoxaparin (LOVENOX) injection 40 mg  40 mg SubCUTAneous Daily Alexia Cartre MD   40 mg at 12/09/21 1812    aspirin EC tablet 81 mg  81 mg Oral Daily Alexia Carter MD        Or    aspirin suppository 300 mg  300 mg Rectal Daily Alexia Carter MD   300 mg at 12/09/21 1812    labetalol (NORMODYNE;TRANDATE) injection 10 mg  10 mg IntraVENous Q10 Min PRN Alexia Carter MD   10 mg at 12/10/21 0813    hydrALAZINE (APRESOLINE) injection 10 mg  10 mg IntraVENous Q6H PRN Alexia Carter MD   10 mg at 12/08/21 1851    0.9 % sodium chloride bolus  500 mL IntraVENous PRN Alexia Carter MD        potassium chloride Clio Wolfgang M) extended release tablet 40 mEq  40 mEq Oral PRN Alexia Carter MD        Or    potassium bicarb-citric acid (EFFER-K) effervescent tablet 40 mEq  40 mEq Oral PRN Alexia Carter MD        Or    potassium chloride 10 mEq/100 mL IVPB (Peripheral Line)  10 mEq IntraVENous PRN Alexia Carter MD         No Known Allergies   reports that he has never smoked. He has never used smokeless tobacco. He reports that he does not drink alcohol and does not use drugs.        Objective:  Exam:   Constitutional:   Vitals:    12/09/21 1715 12/09/21 1942 12/09/21 2315 12/10/21 0800   BP: (!) 146/95 (!) 159/95 (!) 179/90 (!) 220/103   Pulse: 72 70 67 61   Resp: 18 18 18    Temp: 97.5 °F (36.4 °C) 97.7 °F (36.5 °C) 98.7 °F (37.1 °C)    TempSrc: Temporal Temporal Temporal    SpO2: 94% 97% 93%    Weight:       Height: General appearance:  Normal development and appear in no acute distress. Mental Status:   He is awake and alert  Nonverbal  Global aphasia  Good attention unable to assess fund of knowledge or memory due to aphasia  Cranial Nerves:   Pupil is round reactive and symmetric  No gaze preference  Right facial asymmetry  Unable to protrude his tongue  Normal sensation    Motor: Right-sided weakness -3/5  Normal tone  DTRs were brisker on the right  Right drift  No sensory loss  Gait unsteady          Data:  LABS:   Lab Results   Component Value Date     12/10/2021    K 3.4 12/10/2021    K 3.3 12/09/2021     12/10/2021    CO2 22 12/10/2021    BUN 23 12/10/2021    CREATININE 1.1 12/10/2021    GFRAA >60 12/10/2021    GFRAA >60 11/15/2012    LABGLOM >60 12/10/2021    GLUCOSE 94 12/10/2021    GLUCOSE 102 05/09/2017    MG 2.20 12/09/2021    CALCIUM 9.7 12/10/2021     Lab Results   Component Value Date    WBC 5.3 12/10/2021    RBC 5.19 12/10/2021    RBC 5.49 05/09/2017    HGB 16.3 12/10/2021    HCT 48.5 12/10/2021    MCV 93.5 12/10/2021    RDW 14.0 12/10/2021     12/10/2021     Lab Results   Component Value Date    INR 0.96 12/08/2021    PROTIME 10.8 12/08/2021         I reviewed blood testing and other test results and discussed results with the patient      Impression: The patient is about the same. Not improving. Acute aphasia with right-sided weakness, severe secondary to new ischemic right MCA stroke. Thromboembolic versus cardioembolic. Hypertension, not controlled  Hyperlipidemia  Hypokalemia  Prediabetes        Recommendation  Continue speech and aspiration precautions.   Significant risk for aspiration  PT and OT  Continue aspirin and Plavix  Statin  Will need inpatient rehab  Telemetry  DVT and GI prophylaxis  Echo still pending  Blood pressure monitor  Neurochecks  Continue current blood pressure medications  DC planning after the above work-up             Kaylyn Castillo MD [phone]      This dictation was generated by voice recognition computer software. Although all attempts are made to edit the dictation for accuracy, there may be errors in the transcription that are not intended.

## 2021-12-10 NOTE — DISCHARGE SUMMARY
[facility] -- Physician Discharge Summary     Gabino Pak  1945  MRN: [id number]    Admit Date: 12/8/2021  Discharge Date: 12/14/2021  6:07 PM    Attending MD: Casey Powell MD  Discharging MD: Casey Powell MD  PCP: Venus Bosworth, [place] [id number] / Isela [place] [phone]    Admission Diagnosis: Aphasia [R47.01]  Hypokalemia [E87.6]  Troponin level elevated [R77.8]  Acute CVA (cerebrovascular accident) [facility]) [I63.9]  DISCHARGE DIAGNOSIS: same    Full Hospital Problem List:  Active Hospital Problems    Diagnosis Date Noted    Aphasia [R47.01]     Acute CVA (cerebrovascular accident) ([facility] [place] [address] [I63.9] 12/08/2021    Gastroesophageal reflux disease without esophagitis [K21.9] 07/12/2016    Dyslipidemia [E78.5] 12/16/2014    HTN (hypertension), benign [I10] 11/08/2012           Hospital Course:  67 y. o. male  who presents to the ED complaining of what he says started at 10 PM last night with trouble with speech and communication. [facility] did not get medical attention at the time.  This morning around 5 in the morning approximately he tried to get up and go to the bathroom and fell, [place] care staff apparently got him back into bed but may not have noticed his trouble with speech because EMS was not called until later this morning.  Patient does report a history of stroke in the past and is on Plavix.  He says he felt fine prior to the 10 PM onset last night.  He does not have a headache.  He denies any injuries from the fall that occurred earlier this morning, specifically no head or neck injury.  Denies any fevers or recent illnesses.  He does not feel numb or weak acutely in any of his extremities.  Does feel a little uncoordinated but not dizzy.         Head CT shows previous dural disease but nothing acute, specifically no bleed.  Not a TPA candidate due to timeline of symptoms although his NIH is a 4 and I have a high clinical suspicion for stroke.     Dr. Mae from  stroke team was consulted about the patient's ED history, physical, workup, and course so far.  Recommendations from this consultant included agreement with no tPA due to timeline of symptoms.      MRI done   Impression:       Acute lacunar infarct within the right mid corona radiata. Likely subacute lacunar infarct within the contralateral mid corona radiata. Neuro is asked to see pt  Recs: Will need event monitor outpatient to exclude possibility of A. fib  DC planning when medically stable    HOwever, he has continued dysphgia  Speech tx recommends strict NPO  PEG is broached with pt/family, but he declines    They elect to enroll with hospice    PT/OT rec return to SNF with [facility]     Consults made during Hospitalization:  IP CONSULT TO STROKE TEAM  IP CONSULT TO INTERNAL MEDICINE  IP CONSULT TO NEUROLOGY    Treatment team at time of Discharge: Treatment Team: Attending Provider: Tarik Cason MD; Consulting Physician: Tarik Cason MD; Consulting Physician: Ruifna Pickett MD; Nursing Student: Rick Landeros; Utilization Reviewer: Eva Delvalle RN; Registered Nurse: Kandace Martinez RN; Occupational Therapist: Owen Tim OT    Imaging Results:  CT HEAD WO CONTRAST    Result Date: 12/8/2021  EXAMINATION: CT OF THE HEAD WITHOUT CONTRAST  12/8/2021 11:48 am TECHNIQUE: CT of the head was performed without the administration of intravenous contrast. Dose modulation, iterative reconstruction, and/or weight based adjustment of the mA/kV was utilized to reduce the radiation dose to as low as reasonably achievable.  COMPARISON: Head CT 07/08/2021 HISTORY: ORDERING SYSTEM PROVIDED HISTORY: speech trouble TECHNOLOGIST PROVIDED HISTORY: Reason for exam:-> speech trouble Has a \"code stroke\" or \"stroke alert\" been called?-> yes Decision Support Exception - unselect if not a suspected or confirmed emergency medical condition->Emergency Medical Condition (MA) FINDINGS: BRAIN/VENTRICLES: There is no acute intracranial hemorrhage, mass effect or midline shift. No abnormal extra-axial fluid collection. The gray-white differentiation is maintained without evidence of an acute infarct. There is prominence of the ventricles and sulci due to global parenchymal volume loss. There are nonspecific areas of hypoattenuation within the periventricular and subcortical white matter, which likely represent chronic microvascular ischemic change. Remote lacunar stroke bilateral thalamus and left caudate lobe. ORBITS: The visualized portion of the orbits demonstrate no acute abnormality. SINUSES: The visualized paranasal sinuses and mastoid air cells demonstrate no acute abnormality. SOFT TISSUES/SKULL: No acute abnormality of the visualized skull or soft tissues. 1. No acute intracranial abnormality. 2. Remote lacunar stroke bilateral thalamus and left caudate lobe. 3. Senescent changes. 4.  White matter hypoattenuation described is typical of microvascular ischemic disease or as sequela of dysmyelinating/demyelinating processes. Per stroke alert protocol, the at Dr. Huey Maddox radiology any Koreen David results were called by Dr. Veronica Tay to [facility] on 12/8/2021 at 12:13. CT CERVICAL SPINE WO CONTRAST    Result Date: 12/8/2021  EXAMINATION: CT OF THE CERVICAL SPINE WITHOUT CONTRAST 12/8/2021 11:48 am TECHNIQUE: CT of the cervical spine was performed without the administration of intravenous contrast. Multiplanar reformatted images are provided for review. Dose modulation, iterative reconstruction, and/or weight based adjustment of the mA/kV was utilized to reduce the radiation dose to as low as reasonably achievable.  COMPARISON: 07/08/2021 HISTORY: ORDERING SYSTEM PROVIDED HISTORY: stroke alert, fall TECHNOLOGIST PROVIDED HISTORY: Reason for exam:->stroke alert, fall Decision Support Exception - unselect if not a suspected or confirmed emergency medical condition->Emergency Medical Condition (MA) FINDINGS: BONES/ALIGNMENT: There is no acute fracture or traumatic malalignment. DEGENERATIVE CHANGES: Multilevel degenerative changes. SOFT TISSUES: There is no prevertebral soft tissue swelling. No acute abnormality of the cervical spine. XR CHEST PORTABLE    Result Date: 12/8/2021  EXAMINATION: ONE XRAY VIEW OF THE CHEST 12/8/2021 12:25 pm COMPARISON: Chest radiograph 4-7-2019 HISTORY: ORDERING SYSTEM PROVIDED HISTORY: dizziness TECHNOLOGIST PROVIDED HISTORY: Reason for exam:->dizziness FINDINGS: Lung volumes are low. No pulmonary consolidation, effusion or pneumothorax. There is borderline cardiac enlargement. Mild tortuosity of the thoracic aorta. 1. Borderline cardiomegaly. 2.   No evidence of pulmonary edema. CTA HEAD NECK W CONTRAST    Result Date: 12/8/2021  EXAMINATION: CTA OF THE HEAD AND NECK WITH CONTRAST 12/8/2021 11:48 am: TECHNIQUE: CTA of the head and neck was performed with the administration of intravenous contrast. Multiplanar reformatted images are provided for review. MIP images are provided for review. Stenosis of the internal carotid arteries measured using NASCET criteria. Dose modulation, iterative reconstruction, and/or weight based adjustment of the mA/kV was utilized to reduce the radiation dose to as low as reasonably achievable. COMPARISON: Noncontrast CT brain 12/08/2021 HISTORY: ORDERING SYSTEM PROVIDED HISTORY: stroke like sx TECHNOLOGIST PROVIDED HISTORY: Reason for exam:->stroke like sx Decision Support Exception - unselect if not a suspected or confirmed emergency medical condition->Emergency Medical Condition (MA) FINDINGS: CTA NECK: AORTIC ARCH/ARCH VESSELS: Mild calcified atherosclerotic plaque is present within the aortic arch. The origins of the great vessels are normal.  There is no significant stenosis of the innominate or subclavian arteries. CAROTID ARTERIES: The common carotid arteries are normal in appearance.  There is no significant stenosis or dissection. There is calcified and noncalcified atherosclerotic plaque at the origins of the internal carotid arteries. There is a 50% stenosis within the proximal right internal carotid artery based on NASCET criteria. There is noncalcified atherosclerotic plaque extending into the lumen of the right internal carotid artery. There is no significant stenosis of the left internal carotid artery evident based on NASCET criteria. VERTEBRAL ARTERIES: There is a mild stenosis at the origin of the right vertebral artery. The vertebral arteries are otherwise unremarkable. SOFT TISSUES: In the lung apices are clear. There is no pathologically enlarged lymphadenopathy identified. There is no soft tissue mass evident. The parotid glands and submandibular glands are normal. BONES: No lytic or blastic osseous lesions are identified. 3D surface reformations were performed and concur with the above findings. CTA HEAD: ANTERIOR CIRCULATION: Atherosclerotic calcifications are present within the cavernous segments of the internal carotid arteries. There is no significant stenosis or aneurysm evident. The anterior and middle cerebral arteries are normal.  No significant stenosis or aneurysm is identified. POSTERIOR CIRCULATION: The distal vertebral arteries are normal.  The basilar artery is normal.  There is no significant stenosis or aneurysm identified. There is a fetal origin of the right posterior cerebral artery, an anatomic variant. The left posterior cerebral artery is normal. OTHER: No dural venous sinus thrombosis on this non-dedicated study. BRAIN: There is no mass effect or midline shift. There is no vascular malformation identified. 3D surface reformations were performed and concur with the above findings. This scan was analyzed using Viz. ai contact LVO. Identification of suspected findings is not for diagnostic use beyond notification.  Viz LVO is limited to analysis of imaging data and should not be used in-lieu of full patient evaluation or relied upon to make or confirm diagnosis. 1. No large vessel occlusion, significant stenosis or cerebral aneurysm identified. 2. Calcified and noncalcified atherosclerotic plaque at the origin of the right internal carotid artery resulting in a 50% stenosis based on NASCET criteria. There is noncalcified atherosclerotic plaque extending into the lumen of the right internal carotid artery which could predispose to distal emboli. 3. Mild stenosis at the origin of the right vertebral artery. MRI brain without contrast    Result Date: 12/8/2021  EXAMINATION: MRI OF THE BRAIN WITHOUT CONTRAST  12/8/2021 7:45 pm TECHNIQUE: Multiplanar multisequence MRI of the brain was performed without the administration of intravenous contrast. COMPARISON: None. HISTORY: ORDERING SYSTEM PROVIDED HISTORY: TIA TECHNOLOGIST PROVIDED HISTORY: Reason for exam:->TIA Reason for Exam: started at 10 PM last night with trouble with speech and communication. FINDINGS: INTRACRANIAL STRUCTURES/VENTRICLES: There is volume loss with mild chronic white matter microvascular ischemic change. Multiple remote lacunar infarcts are present within the deep gray matter of both cerebral hemispheres. There is a chronic lacunar infarct within the cerebellum, on the left hand side. Normal expected signal voids are present within the vessels at the base of skull. No acute intracranial mass, shift, or bleed is identified. There is an acute lacunar infarct within the right mid corona radiata as well as a probable subacute lacunar infarct within the left mid corona radiata. ORBITS: The visualized portion of the orbits demonstrate no acute abnormality. SINUSES: There is mucosal thickening of the right maxillary antrum. BONES/SOFT TISSUES: The bone marrow signal intensity appears normal. The soft tissues demonstrate no acute abnormality. Acute lacunar infarct within the right mid corona radiata.  Likely subacute lacunar infarct within the contralateral mid corona radiata. The findings were sent to the Radiology Results [address] at 8:53 pm on 12/8/2021to be communicated to a licensed caregiver. Fluoroscopy modified barium swallow with video    Result Date: 12/9/2021  EXAMINATION: MODIFIED BARIUM SWALLOW WAS PERFORMED IN CONJUNCTION WITH SPEECH PATHOLOGY SERVICES TECHNIQUE: Fluoroscopic evaluation of the swallowing mechanism was performed using cineradiography with multiple consistency of barium product in conjunction with speech pathology services. FLUOROSCOPY DOSE AND TYPE OR TIME AND EXPOSURES: 2 minutes 18 seconds fluoroscopy time was utilized for the study with 18 cine loops obtained. COMPARISON: None HISTORY: ORDERING SYSTEM PROVIDED HISTORY: swallowing TECHNOLOGIST PROVIDED HISTORY: Reason for exam:->swallowing Reason for Exam: Evaluate swallowing Stroke patient, difficulty swallowing. FINDINGS: With the patient in the seated right lateral position, the patient swallowed multiple consistency foods in succession while under video fluoroscopic surveillance. The patient swallowed thin liquids, nectar thickened liquids, and honey thickened liquids from a teaspoon and cup. The patient also swallowed purees. There was a poor oral motor phase of swallowing throughout the examination. There was premature spillage of contrast into the valleculae and piriform sinuses throughout the exam with notable residuals throughout the study as well. There was positive silent tracheal aspiration of thin liquids. There was mild-to-moderate laryngeal penetration without tracheal aspiration of nectar thickened liquids. There was no evidence of laryngeal penetration or tracheal aspiration of honey thickened liquids and purees. With head turn to the right, there appeared to be improved clearing of residuals within the valleculae and piriform sinuses. 1. Positive silent tracheal aspiration of thin liquids.  2. Mild-to-moderate laryngeal penetration without tracheal aspiration of nectar thickened liquids. 3. No evidence of laryngeal penetration or tracheal aspiration of honey thickened liquids and purees. Please see separate speech pathology report for full discussion of findings and recommendations. Discharge Exam:  BP (!) 220/103   Pulse 61   Temp 98.7 °F (37.1 °C) (Temporal)   Resp 18   Ht 5' 6\" (1.676 m)   Wt 167 lb 8.8 oz (76 kg)   SpO2 93%   BMI 27.04 kg/m²   General appearance: alert, appears stated age and cooperative  Head: Normocephalic, without obvious abnormality, atraumatic  Lungs: clear to auscultation bilaterally  Heart: regular rate and rhythm, S1, S2 normal, no murmur, click, rub or gallop  Abdomen: soft, non-tender; bowel sounds normal; no masses,  no organomegaly  Extremities: extremities normal, atraumatic, no cyanosis or edema    Disposition: SNF    Condition: stable    Discharge Medications:     Medication List      CHANGE how you take these medications    pantoprazole 40 MG tablet  Commonly known as: PROTONIX  Take 1 tablet by mouth daily  What changed: how much to take        CONTINUE taking these medications    alendronate 70 MG tablet  Commonly known as: FOSAMAX  TAKE 1 TABLET EVERY 7 DAYS     amLODIPine 10 MG tablet  Commonly known as: NORVASC  TAKE 1 TABLET DAILY     clopidogrel 75 MG tablet  Commonly known as: PLAVIX  TAKE 1 TABLET DAILY     clotrimazole-betamethasone 1-0.05 % cream  Commonly known as: LOTRISONE  APPLY TOPICALLY TWICE DAILY     Compression Stockings Misc  Compression for BLE daily     cyanocobalamin 1000 MCG tablet     lisinopril 40 MG tablet  Commonly known as: PRINIVIL;ZESTRIL  TAKE 1 TABLET DAILY     ondansetron 4 MG tablet  Commonly known as: ZOFRAN     senna 8.6 MG tablet  Commonly known as: SENOKOT     simvastatin 20 MG tablet  Commonly known as: ZOCOR  TAKE 1 TABLET NIGHTLY            Allergies:  No Known Allergies    Follow up Instructions:   Follow-up with PCP: OLGA Moreira CNP in 2 wk .       Total time spent on day of discharge including face-to-face visit, examination, documentation, counseling, preparation of discharge plans and followup, and discharge medicine reconciliation and presciptions is 38 minutes    Signed:  Lavinia Valdes MD  12/13/2021

## 2021-12-10 NOTE — PROGRESS NOTES
Pt resting in bed, fluids running 75/ml/hr, external catheter in place. Suction set and oral care completed. Pt urged to call when he voids to make sure placement of external cath is good. Pt nodded in agreement. Will continue to monitor.

## 2021-12-10 NOTE — PROGRESS NOTES
Speech  Language And Dysphagia Treatment Note    Name: Fran Alcantar  : 1945  Medical Diagnosis: Aphasia [R47.01]  Hypokalemia [E87.6]  Troponin level elevated [R77.8]  Acute CVA (cerebrovascular accident) ([facility] [address] [I63.9]  Treatment Diagnosis: Oropharyngeal Dysphagia, Apraxia, Expressive Aphasia   Pain: 0/10 reported by Pt    Patient's response to therapy:  Pt awake and alert positioned upright in chair. Pt continues to demonstrate severely decreased ability to functionally communicate secondary to apraxia and aphasia. Simple communication board was trialled. Modified Barium Swallow Study:  Modified Barium Swallow evaluation completed on 2021. Results indicate moderate/severe oropharyngeal dysphagia. Pt demonstrated deep laryngeal penetration and silent aspiration of thin liquids and deep laryngeal penetration of Mildly Thick (Nectar) Liquids that was not self clearing. Cued cough was weak and not effective. Although pt did not demonstrate aspiration of Moderately Thick (honey) Liquids or puree during this study moderately impaired oral and pharyngeal clearing was assessed with all textures which pt demonstrated difficulty clearing. He also demonstrates severely impaired oral phase of the swallow which impacts pt's ability to functionally accept PO. At this time pt demonstrates high risk for aspiration due to oral and pharyngeal phase deficits, new CVA and impaired reflexive/voltional cough. He also demonstrates high risk for malnutrition due to severity of dysphagia. See below for recommendations.    (See note for more details)     Dysphagia Treatment:  Current Diet Level: 1) NPO with ongoing assessment of PO tolerance at bedside and implementation of strategies prior to diet advance   2) Due to severity of dysphagia pt may benefit from discussion of code status and wishes re; alternative means of nutrition/hydration   Tolerance of Current Diet Level: N/A     Assessment of Texture Tolerance:  -Impressions: Oral mechanism examination reveals reduced oral cavity opening, decreased labial ROM, decreased labial seal and minimal volitional lingual movement (able to protrude to teeth, unable to lateralize)-consistent with apraxia. Per chart, RN has performed oral care with suction. Trials of Moderately Thick (honey) Liquids (tsp and cup) and puree were provided. During Modified Barium Swallow head rotation to the right assisted with pharyngeal clearing however, at the bedside pt was unable to consistently rotate head to the right and execute the swallow. Pt demonstrate difficulty stripping bolus from tsp. Dysphagia was characterized by; severely impaired oral phase with non functional bolus transfer (pt repeatedly tipping his head back to assist with bolus propulsion and attempting to \"slurp\" the bolus to assist with clearing), impaired labial seal (impacting oral and pharyngeal bolus transfer), impaired oral clearing with multiple swallows to clear the oral cavity, oral stasis, suspected pharyngeal pooling with delayed swallow initiation and clinical signs of impaired pharyngeal clearing. Pt demonstrated s/s of pharyngeal residue with vocal quality changes, use of multiple delayed swallows (4-5) and intermittent delayed throat clearing. O2 saturations remained stable however, due to vocal quality changes and intermittent coughing aspiration can not be ruled out at bedside. Pt endorsing difficulty swallowing and fatigue. He also has difficulty consistently utilizing compensatory strategies independently (maintaining labial seal, hard swallow and head rotation)    At this time pt does not demonstrate functional ability to maintain nutrition/hydration due to severity of dysphagia. Also due to impaired pharyngeal clearing and decreased airway protection during the swallow pt demonstrates high risk for aspiration.  Based on results of Modified Barium Swallow and results of follow-up at bedside this date; 1) If aggressive means of care are desired recommend consideration of alternative means of nutrition/hydration in conjunction with aggressive dysphagia tx. 2) If comfort care/less aggressive means are desired consider diet advance to Dysphagia I Pureed with Moderately Thick (honey) Liquids however, would suspect high risk for malnutrition and aspiration PNA based on new CVA with resulting dysphagia, apraxia, limited mobility and weak cough. Via yes/no questions pt reports he is willing to consider alternative means of nutrition. Dysphagia Goals, addressed this date:  1. Pt will functionally tolerate trials of Moderately Thick (honey) Liquids and puree with SLP with no overt clinical s/s of aspiration/penetration (ongoing 12/10/2021)   2. Pt/family will demonstrate understanding of results Modified Barium Swallow Study, risk for aspiration, rationale for diet level and compensatory strategies (ongoing 12/10/2021)   3. Pt will utilize appropriate compensatory strategies as indicated with min with cues (head rotation to the right) (ongoing 12/10/2021)   4. Pt will demonstrate improved sensory motor function via targeted exercises and appropriate treatment modalities (ongoing 12/10/2021)     Diet and Treatment Recommendations 12/10/2021:  1) Pending decision re; pt POC- NPO with allowance of necessary PO meds crushed with puree, allow small bites of puree and small sips of Moderately Thick (honey) Liquids via tsp with RN with oral care to follow   2) If aggressive means of care are desired recommend consideration of alternative means of nutrition/hydration in conjunction with aggressive dysphagia tx. 3) If comfort care/less aggressive means are desired consider diet advance to Dysphagia I Pureed with Moderately Thick (honey) Liquids however, would suspect high risk for malnutrition and aspiration PNA based on new CVA with resulting dysphagia, apraxia, limited mobility and weak cough.      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 Suspect apraxia is more severe and having a greater impact on pt's ability to communicate. Pt did demonstrate some verbal initiation this date (vocalizing yes x1 and no x1). Yes/no questions continue to be grossly acurrate with only intermittent repetitions required. Pt is easily distracted but able to return attention to task. With communication board with large pictures f=12, pt was able to locate pictures to indicated wants/needs 5/5 opportunities. He was oriented to person, place, month and year, able to recall reason for admission (via yes/no). Able to recall swallow study and results via yes/no     Speech Language STG, addressed this date:  1. Pt will improve speech intelligibility in connected speech via graded tasks to 80% (ongoing 12/10/2021)   2. Pt will improve verbal expression for functional expression via graded tasks to 80% (ongoing 12/10/2021)   3. Pt will participate in ongoing cognitive assessment with goals to be established as indicated (ongoing 12/10/2021)     Patient/Family Education:Education given to the Pt and nurse, who verbalized understanding    Assessment: Patient progressing toward goals    Plan: Continue as per plan of care    Discharge Recommendations: Pt will benefit from continued skilled Speech Therapy for Speech and Dysphagia services, prior to returning home.     Treatment time  Timed Code Treatment Minutes: 10 minutes   Total Treatment time: 60 minutes     If patient discharges prior to next session this note will serve as a

## 2021-12-10 NOTE — PROGRESS NOTES
Call placed to Dr. Namrata Kemp for fluids to be started. Order given for NS at 75ml/hr continuous. Will continue to monitor.

## 2021-12-10 NOTE — PROGRESS NOTES
Progress Note - Dr. [facility]ora [facility] - Internal Medicine  PCP: Angella Weiss, APRN - CNP Annafarnaz Elias / Raymon Steel [phone]    Hospital Day: 2  Code Status: Full Code  Current Diet: Diet NPO        CC: follow up on medical issues    Subjective:   Carlene Guardado is a 67 y.o. male. Pt seen and examined  Chart reviewed since last visit, labs and imaging below        No new sx    MRI reviewed -    Impression:       Acute lacunar infarct within the right mid corona radiata. Likely subacute lacunar infarct within the contralateral mid corona radiata. BP extremely high - he cannot take po meds as he is NPO per SLP    He denies chest pain, denies shortness of breath, denies nausea,  denies emesis. 10 system Review of Systems is reviewed with patient, and pertinent positives are noted in HPI above . Otherwise, Review of systems is negative. I have reviewed the patient's medical and social history in detail and updated the computerized patient record. To recap: He  has a past medical history of Cerebrovascular disease, Hyperlipidemia, Hypertension, Osteoarthritis, Risk for falls, and Unspecified cerebral artery occlusion with cerebral infarction. . He  has a past surgical history that includes hernia repair and Nose surgery. Nafisa Ya He  reports that he has never smoked. He has never used smokeless tobacco. He reports that he does not drink alcohol and does not use drugs. .        Active Hospital Problems    Diagnosis Date Noted    Aphasia [R47.01]     Acute CVA (cerebrovascular accident) ([facility] [address] [I63.9] 12/08/2021    Gastroesophageal reflux disease without esophagitis [K21.9] 07/12/2016    Dyslipidemia [E78.5] 12/16/2014    HTN (hypertension), benign [I10] 11/08/2012       Current Facility-Administered Medications: perflutren lipid microspheres (DEFINITY) injection 1.65 mg, 1.5 mL, IntraVENous, ONCE PRN  0.9 % sodium chloride infusion, , IntraVENous, Continuous  iopamidol (ISOVUE-370) 76 % injection 75 mL, 75 mL, IntraVENous, ONCE PRN  amLODIPine (NORVASC) tablet 10 mg, 10 mg, Oral, Daily  vitamin B-12 (CYANOCOBALAMIN) tablet 1,000 mcg, 1,000 mcg, Oral, Daily  senna (SENOKOT) tablet 17.2 mg, 2 tablet, Oral, Daily  lisinopril (PRINIVIL;ZESTRIL) tablet 40 mg, 40 mg, Oral, Daily  clopidogrel (PLAVIX) tablet 75 mg, 75 mg, Oral, Daily  pantoprazole (PROTONIX) tablet 40 mg, 40 mg, Oral, Daily  atorvastatin (LIPITOR) tablet 20 mg, 20 mg, Oral, Nightly  sodium chloride flush 0.9 % injection 10 mL, 10 mL, IntraVENous, 2 times per day  sodium chloride flush 0.9 % injection 10 mL, 10 mL, IntraVENous, PRN  0.9 % sodium chloride infusion, 25 mL, IntraVENous, PRN  ondansetron (ZOFRAN-ODT) disintegrating tablet 4 mg, 4 mg, Oral, Q8H PRN **OR** ondansetron (ZOFRAN) injection 4 mg, 4 mg, IntraVENous, Q6H PRN  magnesium hydroxide (MILK OF MAGNESIA) 400 MG/5ML suspension 30 mL, 30 mL, Oral, Daily PRN  enoxaparin (LOVENOX) injection 40 mg, 40 mg, SubCUTAneous, Daily  aspirin EC tablet 81 mg, 81 mg, Oral, Daily **OR** aspirin suppository 300 mg, 300 mg, Rectal, Daily  labetalol (NORMODYNE;TRANDATE) injection 10 mg, 10 mg, IntraVENous, Q10 Min PRN  hydrALAZINE (APRESOLINE) injection 10 mg, 10 mg, IntraVENous, Q6H PRN  0.9 % sodium chloride bolus, 500 mL, IntraVENous, PRN  potassium chloride (KLOR-CON M) extended release tablet 40 mEq, 40 mEq, Oral, PRN **OR** potassium bicarb-citric acid (EFFER-K) effervescent tablet 40 mEq, 40 mEq, Oral, PRN **OR** potassium chloride 10 mEq/100 mL IVPB (Peripheral Line), 10 mEq, IntraVENous, PRN         Objective:  BP (!) 220/103   Pulse 61   Temp 98.7 °F (37.1 °C) (Temporal)   Resp 18   Ht 5' 6\" (1.676 m)   Wt 167 lb 8.8 oz (76 kg)   SpO2 93%   BMI 27.04 kg/m²      Patient Vitals for the past 24 hrs:   BP Temp Temp src Pulse Resp SpO2   12/10/21 0800 (!) 220/103   61     12/09/21 2315 (!) 179/90 98.7 °F (37.1 °C) Temporal 67 18 93 %   12/09/21 1942 (!) 159/95 97.7 °F (36.5 °C) Temporal 70 18 97 %   12/09/21 1715 (!) 146/95 97.5 °F (36.4 °C) Temporal 72 18 94 %   12/09/21 1300 128/84 98.1 °F (36.7 °C) Oral 77 18 92 %     Patient Vitals for the past 96 hrs (Last 3 readings):   Weight   12/08/21 2132 167 lb 8.8 oz (76 kg)   12/08/21 1214 178 lb (80.7 kg)         No intake or output data in the 24 hours ending 12/10/21 0830      Physical Exam:   Vitals as above  General appearance: alert, appears stated age and cooperative    Head: Normocephalic, without obvious abnormality, atraumatic    Lungs: clear to auscultation bilaterally    Heart: regular rate and rhythm, S1, S2 normal, no murmur    Abdomen: soft, non-tender; bowel sounds normal; no masses, no organomegaly    Extremities: extremities normal, atraumatic, no cyanosis, no edema      Labs:  Lab Results   Component Value Date    WBC 5.3 12/10/2021    HGB 16.3 12/10/2021    HCT 48.5 12/10/2021     (L) 12/10/2021    CHOL 123 12/09/2021    TRIG 79 12/09/2021    HDL 37 (L) 12/09/2021    ALT 12 12/09/2021    AST 19 12/09/2021     12/10/2021    K 3.4 (L) 12/10/2021     12/10/2021    CREATININE 1.1 12/10/2021    BUN 23 (H) 12/10/2021    CO2 22 12/10/2021    TSH 3.98 02/26/2015    PSA 1.68 01/29/2016    INR 0.96 12/08/2021    LABA1C 5.7 12/09/2021    LABMICR Not Indicated 01/11/2021     Lab Results   Component Value Date    PSHRHRY 274 (H) 12/08/2021    TROPONINI 0.03 (H) 12/08/2021       Recent Imaging Results are Reviewed:  CT HEAD WO CONTRAST    Result Date: 12/8/2021  EXAMINATION: CT OF THE HEAD WITHOUT CONTRAST  12/8/2021 11:48 am TECHNIQUE: CT of the head was performed without the administration of intravenous contrast. Dose modulation, iterative reconstruction, and/or weight based adjustment of the mA/kV was utilized to reduce the radiation dose to as low as reasonably achievable.  COMPARISON: Head CT 07/08/2021 HISTORY: ORDERING SYSTEM PROVIDED HISTORY: speech trouble TECHNOLOGIST PROVIDED HISTORY: Reason for exam:-> speech trouble Has a \"code stroke\" or \"stroke alert\" been called?-> yes Decision Support Exception - unselect if not a suspected or confirmed emergency medical condition->Emergency Medical Condition (MA) FINDINGS: BRAIN/VENTRICLES: There is no acute intracranial hemorrhage, mass effect or midline shift. No abnormal extra-axial fluid collection. The gray-white differentiation is maintained without evidence of an acute infarct. There is prominence of the ventricles and sulci due to global parenchymal volume loss. There are nonspecific areas of hypoattenuation within the periventricular and subcortical white matter, which likely represent chronic microvascular ischemic change. Remote lacunar stroke bilateral thalamus and left caudate lobe. ORBITS: The visualized portion of the orbits demonstrate no acute abnormality. SINUSES: The visualized paranasal sinuses and mastoid air cells demonstrate no acute abnormality. SOFT TISSUES/SKULL: No acute abnormality of the visualized skull or soft tissues. 1. No acute intracranial abnormality. 2. Remote lacunar stroke bilateral thalamus and left caudate lobe. 3. Senescent changes. 4.  White matter hypoattenuation described is typical of microvascular ischemic disease or as sequela of dysmyelinating/demyelinating processes. Per stroke alert protocol, the at Dr. Terry Eaton radiology any Kristie Bustamante results were called by Dr. Ashlyn Panchal to [facility] on 12/8/2021 at 12:13. CT CERVICAL SPINE WO CONTRAST    Result Date: 12/8/2021  EXAMINATION: CT OF THE CERVICAL SPINE WITHOUT CONTRAST 12/8/2021 11:48 am TECHNIQUE: CT of the cervical spine was performed without the administration of intravenous contrast. Multiplanar reformatted images are provided for review. Dose modulation, iterative reconstruction, and/or weight based adjustment of the mA/kV was utilized to reduce the radiation dose to as low as reasonably achievable.  COMPARISON: 07/08/2021 HISTORY: ORDERING SYSTEM PROVIDED HISTORY: stroke alert, fall TECHNOLOGIST PROVIDED HISTORY: Reason for exam:->stroke alert, fall Decision Support Exception - unselect if not a suspected or confirmed emergency medical condition->Emergency Medical Condition (MA) FINDINGS: BONES/ALIGNMENT: There is no acute fracture or traumatic malalignment. DEGENERATIVE CHANGES: Multilevel degenerative changes. SOFT TISSUES: There is no prevertebral soft tissue swelling. No acute abnormality of the cervical spine. XR CHEST PORTABLE    Result Date: 12/8/2021  EXAMINATION: ONE XRAY VIEW OF THE CHEST 12/8/2021 12:25 pm COMPARISON: Chest radiograph 4-7-2019 HISTORY: ORDERING SYSTEM PROVIDED HISTORY: dizziness TECHNOLOGIST PROVIDED HISTORY: Reason for exam:->dizziness FINDINGS: Lung volumes are low. No pulmonary consolidation, effusion or pneumothorax. There is borderline cardiac enlargement. Mild tortuosity of the thoracic aorta. 1. Borderline cardiomegaly. 2.   No evidence of pulmonary edema. CTA HEAD NECK W CONTRAST    Result Date: 12/8/2021  EXAMINATION: CTA OF THE HEAD AND NECK WITH CONTRAST 12/8/2021 11:48 am: TECHNIQUE: CTA of the head and neck was performed with the administration of intravenous contrast. Multiplanar reformatted images are provided for review. MIP images are provided for review. Stenosis of the internal carotid arteries measured using NASCET criteria. Dose modulation, iterative reconstruction, and/or weight based adjustment of the mA/kV was utilized to reduce the radiation dose to as low as reasonably achievable. COMPARISON: Noncontrast CT brain 12/08/2021 HISTORY: ORDERING SYSTEM PROVIDED HISTORY: stroke like sx TECHNOLOGIST PROVIDED HISTORY: Reason for exam:->stroke like sx Decision Support Exception - unselect if not a suspected or confirmed emergency medical condition->Emergency Medical Condition (MA) FINDINGS: CTA NECK: AORTIC ARCH/ARCH VESSELS: Mild calcified atherosclerotic plaque is present within the aortic arch.   The origins of the great vessels are normal.  There is no significant stenosis of the innominate or subclavian arteries. CAROTID ARTERIES: The common carotid arteries are normal in appearance. There is no significant stenosis or dissection. There is calcified and noncalcified atherosclerotic plaque at the origins of the internal carotid arteries. There is a 50% stenosis within the proximal right internal carotid artery based on NASCET criteria. There is noncalcified atherosclerotic plaque extending into the lumen of the right internal carotid artery. There is no significant stenosis of the left internal carotid artery evident based on NASCET criteria. VERTEBRAL ARTERIES: There is a mild stenosis at the origin of the right vertebral artery. The vertebral arteries are otherwise unremarkable. SOFT TISSUES: In the lung apices are clear. There is no pathologically enlarged lymphadenopathy identified. There is no soft tissue mass evident. The parotid glands and submandibular glands are normal. BONES: No lytic or blastic osseous lesions are identified. 3D surface reformations were performed and concur with the above findings. CTA HEAD: ANTERIOR CIRCULATION: Atherosclerotic calcifications are present within the cavernous segments of the internal carotid arteries. There is no significant stenosis or aneurysm evident. The anterior and middle cerebral arteries are normal.  No significant stenosis or aneurysm is identified. POSTERIOR CIRCULATION: The distal vertebral arteries are normal.  The basilar artery is normal.  There is no significant stenosis or aneurysm identified. There is a fetal origin of the right posterior cerebral artery, an anatomic variant. The left posterior cerebral artery is normal. OTHER: No dural venous sinus thrombosis on this non-dedicated study. BRAIN: There is no mass effect or midline shift. There is no vascular malformation identified.  3D surface reformations were performed and concur with the above findings. This scan was analyzed using [employer].  contact LVO. Identification of suspected findings is not for diagnostic use beyond notification. Viz LVO is limited to analysis of imaging data and should not be used in-lieu of full patient evaluation or relied upon to make or confirm diagnosis. 1. No large vessel occlusion, significant stenosis or cerebral aneurysm identified. 2. Calcified and noncalcified atherosclerotic plaque at the origin of the right internal carotid artery resulting in a 50% stenosis based on NASCET criteria. There is noncalcified atherosclerotic plaque extending into the lumen of the right internal carotid artery which could predispose to distal emboli. 3. Mild stenosis at the origin of the right vertebral artery. MRI brain without contrast    Result Date: 12/8/2021  EXAMINATION: MRI OF THE BRAIN WITHOUT CONTRAST  12/8/2021 7:45 pm TECHNIQUE: Multiplanar multisequence MRI of the brain was performed without the administration of intravenous contrast. COMPARISON: None. HISTORY: ORDERING SYSTEM PROVIDED HISTORY: TIA TECHNOLOGIST PROVIDED HISTORY: Reason for exam:->TIA Reason for Exam: started at 10 PM last night with trouble with speech and communication. FINDINGS: INTRACRANIAL STRUCTURES/VENTRICLES: There is volume loss with mild chronic white matter microvascular ischemic change. Multiple remote lacunar infarcts are present within the deep gray matter of both cerebral hemispheres. There is a chronic lacunar infarct within the cerebellum, on the left hand side. Normal expected signal voids are present within the vessels at the base of skull. No acute intracranial mass, shift, or bleed is identified. There is an acute lacunar infarct within the right mid corona radiata as well as a probable subacute lacunar infarct within the left mid corona radiata. ORBITS: The visualized portion of the orbits demonstrate no acute abnormality.  SINUSES: There is mucosal thickening of the right maxillary antrum. BONES/SOFT TISSUES: The bone marrow signal intensity appears normal. The soft tissues demonstrate no acute abnormality. Acute lacunar infarct within the right mid corona radiata. Likely subacute lacunar infarct within the contralateral mid corona radiata. The findings were sent to the Radiology Results [address] at 8:53 pm on 12/8/2021to be communicated to a licensed caregiver. Lab Results   Component Value Date    GLUCOSE 94 12/10/2021    GLUCOSE 102 05/09/2017     Lab Results   Component Value Date    POCGLU 120 12/08/2021     BP (!) 220/103   Pulse 61   Temp 98.7 °F (37.1 °C) (Temporal)   Resp 18   Ht 5' 6\" (1.676 m)   Wt 167 lb 8.8 oz (76 kg)   SpO2 93%   BMI 27.04 kg/m²     Assessment and Plan:  Principal Problem:    Acute CVA (cerebrovascular accident) ([address] -Established problem. Stable. Seen on MRI  Plan:  Cont plavix. Speech to see again today  Active Problems:    Hypertension -Established problem. Uncontrolled. 220/103  Plan: see if improves  meds if able to take    Hyperlipidemia -Established problem. Stable. Plan: on lipitor    Gastroesophageal reflux disease without esophagitis -Established problem. Stable. Plan: Continue present orders/plan. Disp -await further evals.  Need better bp control       (Please note that portions of this note were completed with a voice recognition program.  Efforts were made to edit the dictations but occasionally words are mis-transcribed.)        Jennie Hanley MD  12/10/2021

## 2021-12-10 NOTE — PROGRESS NOTES
Physical Therapy  Facility/Department: [address]  Daily Treatment Note  NAME: Braulio White  : 1945  MRN: [id number]    Date of Service: 12/10/2021    Discharge Recommendations:  Braulio White scored a / on the AM-PAC short mobility form. Current research shows that an AM-PAC score of 17 or less is typically not associated with a discharge to the patient's home setting. Based on the patient's AM-PAC score and their current functional mobility deficits, it is recommended that the patient have 3-5 sessions per week of Physical Therapy at d/c to increase the patient's independence. Please see assessment section for further patient specific details. If patient discharges prior to next session this note will serve as a discharge summary. Please see below for the latest assessment towards goals. 24 hour supervision or assist, 3-5 sessions per week   PT Equipment Recommendations  Equipment Needed: No  Other: Defer to next level of care    Assessment   Body structures, Functions, Activity limitations: Decreased functional mobility ; Decreased strength; Decreased endurance; Decreased posture; Decreased ROM; Decreased balance; Decreased fine motor control; Decreased coordination  Assessment: Patient demonstrates R hemiparesis w/ increased tone which, to my understanding, is from previous CVA but possibly worsened by current CVA. Patient follows instructions and appears engaged during therapy session. If not at baseline, would likely benefit from ongoing therapy at d/c.   Treatment Diagnosis: Impaired functional mobility following acute on chronic CVA  Prognosis: Fair  Clinical Presentation: Stable  PT Education: Goals; Plan of Care; PT Role; Transfer Training; Functional Mobility Training  Patient Education: Pt indicates understanding via head nod due to expressive aphasia  Barriers to Learning: Expressive aphasia  REQUIRES PT FOLLOW UP: Yes  Activity Tolerance  Activity Tolerance: Patient Tolerated treatment well; Patient limited by fatigue; Patient limited by endurance  Activity Tolerance: No obvious activity limitations other than weakness. Patient Diagnosis(es): The primary encounter diagnosis was Aphasia. Diagnoses of Troponin level elevated and Hypokalemia were also pertinent to this visit. has a past medical history of Cerebrovascular disease, Hyperlipidemia, Hypertension, Osteoarthritis, Risk for falls, and Unspecified cerebral artery occlusion with cerebral infarction. has a past surgical history that includes hernia repair and Nose surgery. Restrictions  Restrictions/Precautions  Restrictions/Precautions: Fall Risk, Modified Diet (high fall risk)  Required Braces or Orthoses?: No  Position Activity Restriction  Sternal Precautions: Supine BP-- 177/99, EOB after sitting 10 minutes 174/90  Other position/activity restrictions: Per H&P on 12/8 \"76 y.o. male  who presents to the ED complaining of what he says started at 10 PM last night with trouble with speech and communication. He did not get medical attention at the time. This morning around 5 in the morning approximately he tried to get up and go to the bathroom and fell, [place] care staff apparently got him back into bed but may not have noticed his trouble with speech because EMS was not called until later this morning. Patient does report a history of stroke in the past and is on Plavix. He says he felt fine prior to the 10 PM onset last night. He does not have a headache. He denies any injuries from the fall that occurred earlier this morning, specifically no head or neck injury. Denies any fevers or recent illnesses. He does not feel numb or weak acutely in any of his extremities. Does feel a little uncoordinated but not dizzy. Head CT shows previous dural disease but nothing acute, specifically no bleed.   Not a TPA candidate due to timeline of symptoms although his NIH is a 4 and I have a high clinical suspicion for stroke. \"     Subjective   General  Chart Reviewed: Yes  Family / Caregiver Present: No  Subjective  Subjective: Patient non-verbal but comprehends and follows instructions, did say \"hi\" and responds to yes/no questions by nodding or shaking his head. General Comment  Comments: Patient supine in bed. Pain Screening  Patient Currently in Pain: No  Vital Signs  Patient Currently in Pain: No       Orientation  Orientation  Overall Orientation Status: Within Normal Limits (Patient unable to verbally answer orientation questions but appears A&Ox4 based on interaction.)     Objective   Bed mobility  Supine to Sit: Moderate assistance  Scooting: Dependent/Total  Transfers  Sit to Stand: 2 Person Assistance; Moderate Assistance  Stand to sit: 2 Person Assistance; Moderate Assistance  Bed to Chair: Dependent/Total  Comment: Patient stood in [place] w/ MOD x2 assist and transferred to chair w/ [place]obo Cashing. Patient able to stand from chair w/ assist x2 w/ Lobo Cashing to insure nursing will be able to get patient out of chair. Tay Looney RN informed. Ambulation  Ambulation?: No  Stairs/Curb  Stairs?: No     Balance  Posture: Fair  Sitting - Static: Fair; -  Comments: R lateral lean requiring MIN assist when sitting EOB. PROM RLE (degrees)  RLE PROM: WNL  AROM RLE (degrees)  RLE AROM: WFL  RLE General AROM: Increased time to complete.   PROM LLE (degrees)  LLE PROM: WNL  AROM LLE (degrees)  LLE AROM : [facility]     AM-[facility] Score  AM-PAC Inpatient Mobility Raw Score : 9 (12/10/21 1041)  AM-PAC Inpatient T-Scale Score : 30.55 (12/10/21 1041)  Mobility Inpatient CMS 0-100% Score: 81.38 (12/10/21 1041)  Mobility Inpatient CMS G-Code Modifier : CM (12/10/21 1041)        Goals  Short term goals  Time Frame for Short term goals: Before discharge  Short term goal 1: Pt will roll left/right with Min A.  (Not met)  Short term goal 2: Pt will complete supine<>sit with Min A.  (Not met)  Short term goal 3: Pt will sit EOB x10 minutes with no more than Min A.  (Not met)  Short term goal 4: Pt will tolerate transfer bed<>chair with Max A x2. (Not met)  Patient Goals   Patient goals : None stated due to expressive aphasia    Plan    Plan  Times per week: 3-5x  Current Treatment Recommendations: Strengthening, Functional Mobility Training, Transfer Training, Balance Training, ROM, Neuromuscular Re-education, Safety Education & Training, Patient/Caregiver Education & Training, Equipment Evaluation, Education, & procurement, Wheelchair Mobility Training, Positioning, Endurance Training  Safety Devices  Type of devices:  All fall risk precautions in place, Call light within reach, Gait belt, Nurse notified, Patient at risk for falls, Left in chair, Chair alarm in place  Restraints  Initially in place: No     Therapy Time   Individual Concurrent Group Co-treatment   Time In 1000         Time Out 1040         Minutes 40         Timed Code Treatment Minutes: [id number] [facility], [address] [facility], [id number], ATC-R [id number]

## 2021-12-10 NOTE — PLAN OF CARE
Problem: Falls - Risk of:  Goal: Will remain free from falls  Description: Will remain free from falls  12/10/2021 0308 by Debbie Vasquez RN  Outcome: Ongoing  Note: All fall precautions in place, bed in lowest position, frequent rounding to assist with toileting. Pt absent of fall this shift. Problem: Falls - Risk of:  Goal: Absence of physical injury  Description: Absence of physical injury  Outcome: Ongoing     Problem: Skin Integrity:  Goal: Will show no infection signs and symptoms  Description: Will show no infection signs and symptoms  12/10/2021 0308 by Debbie Vasquez RN  Outcome: Ongoing     Problem: Skin Integrity:  Goal: Absence of new skin breakdown  Description: Absence of new skin breakdown  Outcome: Ongoing  Note: Pt turned and repositioned using pillow support and/or wedge throughout shift. Pillows in place to keep heels elevated or Multipodus boots applied to feet. If incontinent pure wick/external male catheter and/or check and change provided with rounding. No new skin breakdown will occur. Problem: HEMODYNAMIC STATUS  Goal: Patient has stable vital signs and fluid balance  Outcome: Ongoing  Note: Pt vital signs will be assessed per floor protocol and as needed. Medications will be administered accordingly to keep vitals signs within parameters that are normal or that are chosen by MD.       Problem: ACTIVITY INTOLERANCE/IMPAIRED MOBILITY  Goal: Mobility/activity is maintained at optimum level for patient  Outcome: Ongoing     Problem: COMMUNICATION IMPAIRMENT  Goal: Ability to express needs and understand communication  Outcome: Ongoing  Note: Pt able to call out however rounding increased because of pt's aphasia. Pt able to communicate through yes and no questions and nodding appropriately.       Problem: Neurological  Goal: Maximum potential motor/sensory/cognitive function  Outcome: Ongoing     Problem: Respiratory:  Goal: Ability to maintain a clear airway will improve  Description: Ability to maintain a clear airway will improve  Outcome: Ongoing     Problem: Respiratory:  Goal: Absence of aspiration  Description: Absence of aspiration  Outcome: Ongoing  Note: Pt NPO.

## 2021-12-10 NOTE — CONSULTS
In patient Neurology consult        [facility] Neurology      MD Ravin Grissom  1945    Date of Service: 12/10/2021    Referring Physician: Loc Velasco MD    Most of the history was obtained from detailed chart reviewing and discussion with the patient's therapist as the patient is currently aphasic and unable to provide me with accurate history. Reason for the consult and CC: Acute aphasia and right-sided weakness. New stroke    HPI:   The patient is a 67y.o.  years old male with history of hypertension, hyperlipidemia and prior stroke who was admitted to the hospital last night from ED with acute speech impairment and right-sided weakness. Symptoms started few hours prior to admission. Description was sudden onset of difficulty speaking and following direction followed by right-sided weakness while going to the bathroom. Degree was severe. Duration was persistent. No falling or injury. No other relieving or aggravating factors. No clear triggers. History of strokes on Plavix. Patient came to the ED for evaluation. Initial imaging showed no acute stroke or large vessel occlusion. He was admitted to the hospital.  Further work-up with MRI brain from last night showed acute right subcortical lacunar strokes. The patient is currently aphasic and able to follow simple direction. Other review of system was limited.     Family History   Problem Relation Age of Onset    Heart Disease Father 64    Diabetes Mother     Heart Disease Mother     High Blood Pressure Mother     Breast Cancer Maternal Grandmother     Breast Cancer Sister          Past Medical History:   Diagnosis Date    Cerebrovascular disease 2007    Right leg weakness    Hyperlipidemia     Hypertension     Osteoarthritis     Risk for falls 06/09/2017    SRINATH 18/56    Unspecified cerebral artery occlusion with cerebral infarction      Past Surgical History:   Procedure Laterality Date    HERNIA REPAIR  NOSE SURGERY       Social History     Tobacco Use    Smoking status: Never Smoker    Smokeless tobacco: Never Used   Vaping Use    Vaping Use: Never used   Substance Use Topics    Alcohol use: No     Alcohol/week: 0.0 standard drinks    Drug use: No     No Known Allergies  Current Facility-Administered Medications   Medication Dose Route Frequency Provider Last Rate Last Admin    perflutren lipid microspheres (DEFINITY) injection 1.65 mg  1.5 mL IntraVENous ONCE PRN Lavinia Valdes MD        iopamidol (ISOVUE-370) 76 % injection 75 mL  75 mL IntraVENous ONCE PRN Kilo Camargo MD        amLODIPine (NORVASC) tablet 10 mg  10 mg Oral Daily Lavinia Valdes MD        vitamin B-12 (CYANOCOBALAMIN) tablet 1,000 mcg  1,000 mcg Oral Daily Lavinia Valdes MD        [facility]) tablet 17.2 mg  2 tablet Oral Daily Lavinia Valdes MD        lisinopril (PRINIVIL;ZESTRIL) tablet 40 mg  40 mg Oral Daily Lavinia Valdes MD        clopidogrel (PLAVIX) tablet 75 mg  75 mg Oral Daily Lavinia Valdes MD        pantoprazole (PROTONIX) tablet 40 mg  40 mg Oral Daily Lavinia Valdes MD        atorvastatin (LIPITOR) tablet 20 mg  20 mg Oral Nightly Lavinia Valdes MD        sodium chloride flush 0.9 % injection 10 mL  10 mL IntraVENous 2 times per day Lavinia Valdes MD   10 mL at 12/10/21 0900    sodium chloride flush 0.9 % injection 10 mL  10 mL IntraVENous PRN Lavinia Valdes MD        0.9 % sodium chloride infusion  25 mL IntraVENous PRN Lavinia Valdes MD        ondansetron (ZOFRAN-ODT) disintegrating tablet 4 mg  4 mg Oral Q8H PRN Lavinia Valdes MD        Or    ondansetron [facility]) injection 4 mg  4 mg IntraVENous Q6H PRN Lavinai Valdes MD        magnesium hydroxide (MILK OF MAGNESIA) 400 MG/5ML suspension 30 mL  30 mL Oral Daily PRN Lavinia Valdes MD        enoxaparin (LOVENOX) injection 40 mg  40 mg SubCUTAneous Daily Lavinia Valdes MD   40 mg at 12/09/21 1812    aspirin EC tablet 81 mg  81 mg Oral Daily Tamara Lopez MD        Or    aspirin suppository 300 mg  300 mg Rectal Daily Tamara Lopez MD   300 mg at 12/09/21 1812    labetalol (NORMODYNE;TRANDATE) injection 10 mg  10 mg IntraVENous Q10 Min PRN Tamara Lopez MD   10 mg at 12/10/21 0813    hydrALAZINE (APRESOLINE) injection 10 mg  10 mg IntraVENous Q6H PRN Tamara Lopez MD   10 mg at 12/08/21 1851    0.9 % sodium chloride bolus  500 mL IntraVENous PRN Tamara Lopez MD        potassium chloride [facility]) extended release tablet 40 mEq  40 mEq Oral PRN Tamara Lopez MD        Or    potassium bicarb-citric acid (EFFER-K) effervescent tablet 40 mEq  40 mEq Oral PRN Tamara Lopez MD        Or    potassium chloride 10 mEq/100 mL IVPB (Peripheral Line)  10 mEq IntraVENous PRN Tamara Lopez MD           ROS: 10-14 system review was limited due to MS changes or as per HPI. Constitutional:   Vitals:    12/09/21 1715 12/09/21 1942 12/09/21 2315 12/10/21 0800   BP: (!) 146/95 (!) 159/95 (!) 179/90 (!) 220/103   Pulse: 72 70 67 61   Resp: 18 18 18    Temp: 97.5 °F (36.4 °C) 97.7 °F (36.5 °C) 98.7 °F (37.1 °C)    TempSrc: Temporal Temporal Temporal    SpO2: 94% 97% 93%    Weight:       Height:           General appearance: Aphasic  Eye: Fundus of the eye: Optic disc is difficult to obtain due to poor cooperation from the patient  Neck: supple  Cardiovascular: No lower leg edema with good pulsation. Mental Status:   AAO times one, himself  Good attention but nonfluent  Unable to assess memory or fund of knowledge due to aphasia  language, nonfluent aphasia. Able to follow direction. Cranial Nerves:   II:  Pupils: equal, round, reactive to light  III,IV,VI: Extra Ocular Movements are intact.  No nystagmus  V: Facial sensation : not tested due to aphasia  VII: Facial strength and movements: Right facial asymmetry  VIII: Hearing: can track my voice  IX: Palate elevation: Symmetric  XI: Shoulder shrug: Symmetric  XII: Tongue movements: midline  Musculoskeletal:  The patient can move left side spontaneously. Right-sided weakness 3/5. Tone: Normal tone. No rigidity. Reflexes: Symmetric 2+ in both arms and diminished in his legs. Planters: flexor bilaterally. Coordination: No abnormal movement, right drift  Sensation: No major sensory deficit   gait/Posture: Cannot be tested due to poor cooperation from the patient. Data:  LABS:   Lab Results   Component Value Date     12/10/2021    K 3.4 12/10/2021    K 3.3 12/09/2021     12/10/2021    CO2 22 12/10/2021    BUN 23 12/10/2021    CREATININE 1.1 12/10/2021    GFRAA >60 12/10/2021    GFRAA >60 11/15/2012    LABGLOM >60 12/10/2021    GLUCOSE 94 12/10/2021    GLUCOSE 102 05/09/2017    MG 2.20 12/09/2021    CALCIUM 9.7 12/10/2021     Lab Results   Component Value Date    WBC 5.3 12/10/2021    RBC 5.19 12/10/2021    RBC 5.49 05/09/2017    HGB 16.3 12/10/2021    HCT 48.5 12/10/2021    MCV 93.5 12/10/2021    RDW 14.0 12/10/2021     12/10/2021     Lab Results   Component Value Date    INR 0.96 12/08/2021    PROTIME 10.8 12/08/2021       Neuroimaging were independently reviewed by me. Reviewed notes from different physicians  Reviewed lab and blood testing    Impression:  Acute aphasia with right-sided weakness, severe secondary to new ischemic right MCA stroke. Thromboembolic versus cardioembolic. Hypertension, not controlled  Hyperlipidemia  Hypokalemia  Prediabetes      Recommendation:  PT and OT  Speech  Aspiration precautions  Echo  Telemetry  DVT and GI prophylaxis  Continue aspirin and Plavix for now  Statin  Continue home blood pressure medications  Blood pressure control  Goal inpatient 140160/90  Will need rehab  Replace potassium   Blood sugar monitor  Neurochecks  Will need event monitor outpatient to exclude possibility of A. fib  DC planning when medically stable      Thank you for referring such patient.  If you have any questions regarding my consult note, please don't hesitate to call me. Viany Kumar MD  [phone]    This dictation was generated by voice recognition computer software.  Although all attempts are made to edit the dictation for accuracy, there may be errors in the  transcription that are not intended

## 2021-12-10 NOTE — CARE COORDINATION
Spoke with patient's sisterMarino Husbands is his HC--[phone] states patient is a DNR. She is aware that patient is having difficulty speaking and swallowing. Referral was initiated to [facility] as patient was in Assisted living. Please call [facility] regarding current status or Health care decisions. Perfect serve to MD  Please call, patient's sisterMarino Husbands is his HC--[phone] states patient is a DNR.  thank you Rosanna Delgadoing

## 2021-12-10 NOTE — PROGRESS NOTES
Occupational Therapy  Facility/Department: [address]  Daily Treatment Note  NAME: Gabino Pak  : 1945  MRN: [id number]    Date of Service: 12/10/2021    Discharge Recommendations: Gabino Pak scored a 9/24 on the AM-PAC ADL Inpatient form. Current research shows that an AM-PAC score of 17 or less is typically not associated with a discharge to the patient's home setting. Based on the patient's AM-PAC score and their current ADL deficits, it is recommended that the patient have 3-5 sessions per week of Occupational Therapy at d/c to increase the patient's independence. Please see assessment section for further patient specific details. If patient discharges prior to next session this note will serve as a discharge summary. Please see below for the latest assessment towards goals. OT Equipment Recommendations  Other: defer to next level of care    Assessment   Performance deficits / Impairments: Decreased functional mobility ; Decreased ADL status; Decreased endurance; Decreased strength; Decreased balance; Decreased vision/visual deficit; Decreased posture; Decreased coordination  Assessment: Pt is currently functioning below occupational baseline and demo the deficits listed above, pt would benefit from continued skilled OT services to address these deficits and increase IND, safety,and ease with all occupational pursuits  Treatment Diagnosis: Decreased ADL status, functional mobility, and functional transfers d/t Acute CVA (cerebrovascular accident) ([facility] [address]  OT Education: OT Role; Plan of Care; Transfer Training  Patient Education: Patient appears to understand directions, reinforce as needed  Barriers to Learning: language  REQUIRES OT FOLLOW UP: Yes  Activity Tolerance  Activity Tolerance: Patient Tolerated treatment well  Safety Devices  Safety Devices in place: Yes  Type of devices: All fall risk precautions in place; Nurse notified; Call light within reach;  Chair alarm in place; Left in chair; Patient at risk for falls  Restraints  Initially in place: No         Patient Diagnosis(es): The primary encounter diagnosis was Aphasia. Diagnoses of Troponin level elevated and Hypokalemia were also pertinent to this visit. has a past medical history of Cerebrovascular disease, Hyperlipidemia, Hypertension, Osteoarthritis, Risk for falls, and Unspecified cerebral artery occlusion with cerebral infarction. has a past surgical history that includes hernia repair and Nose surgery. Restrictions  Restrictions/Precautions  Restrictions/Precautions: Fall Risk, Modified Diet (high fall risk)  Required Braces or Orthoses?: No  Position Activity Restriction  Sternal Precautions: Supine BP-- 177/99, EOB after sitting 10 minutes 174/90  Other position/activity restrictions: Per H&P on 12/8 \"76 y.o. male  who presents to the ED complaining of what he says started at 10 PM last night with trouble with speech and communication. He did not get medical attention at the time. This morning around 5 in the morning approximately he tried to get up and go to the bathroom and fell, [facility]'s care staff apparently got him back into bed but may not have noticed his trouble with speech because EMS was not called until later this morning. Patient does report a history of stroke in the past and is on Plavix. He says he felt fine prior to the 10 PM onset last night. He does not have a headache. He denies any injuries from the fall that occurred earlier this morning, specifically no head or neck injury. Denies any fevers or recent illnesses. He does not feel numb or weak acutely in any of his extremities. Does feel a little uncoordinated but not dizzy. Head CT shows previous dural disease but nothing acute, specifically no bleed. Not a TPA candidate due to timeline of symptoms although his NIH is a 4 and I have a high clinical suspicion for stroke. \"        Subjective   General  Chart Reviewed: Yes  Patient assessed for rehabilitation services?: Yes  Additional Pertinent Hx: PMH: Cerebrovascular disease (2007), Hyperlipidemia, Hypertension, Osteoarthritis, Risk for falls (06/09/2017), and Unspecified cerebral artery occlusion with cerebral infarction. Family / Caregiver Present: No  Referring Practitioner: Lavinia Valdes MD  Diagnosis: Acute CVA (cerebrovascular accident) ([facility] [address]  Subjective  Subjective: Pt supine in bed upon arrival, agreeable to OT evaluation and treat. presenting with significant expressive aphasia but able to shake head \"yes\" or \"no\"      Orientation  Orientation  Overall Orientation Status: Within Functional Limits (yes/no questions)  Objective    ADL  Feeding: NPO  Grooming: Minimal assistance (wash face sitting EOB)  UE Bathing: Maximum assistance  LE Bathing: Dependent/Total  UE Dressing: Maximum assistance  LE Dressing: Dependent/Total  Toileting: Dependent/Total  Additional Comments: Performed sponge bathing and dressing from EOB. Poor sitting balance, leaning to right with left UE to hold onto handrail. Balance  Sitting Balance: Maximum assistance (fluctuated from min to max, leaning to right)  Standing Balance: Maximum assistance  Standing Balance  Time: 1-2 minutes total  Activity: ANDREI ROD  Comment: Mod of 2 in ANDREI ROD  Bed mobility  Supine to Sit: Moderate assistance  Scooting: Maximal assistance  Transfers  Transfer Comments: ANDREI ROD USED Bed to chair, mod of 2, increased time, patient did participate in using bilateral UEs to pull up to stance                       Cognition  Cognition Comment: hard to formally assess, pt following one step commands throughout and receptive language appeared [facility] throughout, however, significant expressive aphasia noted.  Pt able to shake head \"yes\" and \"No\" to questions asked and answered appropriately     Perception  Unilateral Attention: Cues to maintain midline in sitting (right lateral leaning)  Initiation: Cues to initiate tasks              Exercises  Shoulder Flexion: 2 x 10  Elbow Flexion: 2 x 10  Elbow Extension: 2 x 10  LUE AROM (degrees)  LUE AROM : WFL  Left Hand AROM (degrees)  Left Hand AROM: WFL  RUE AROM (degrees)  RUE AROM : WFL  RUE General AROM: Right UE moves slower than left  Right Hand AROM (degrees)  Right Hand AROM: WFL                 Plan   Plan  Times per week: 5-7x/wk  Times per day: Daily  Current Treatment Recommendations: Strengthening, Balance Training, Functional Mobility Training, Endurance Training, Equipment Evaluation, Education, & procurement, Patient/Caregiver Education & Training, Self-Care / ADL, Safety Education & Training, Neuromuscular Re-education  G-Code     OutComes Score                                                  AM-PAC Score        AM-PAC Inpatient Daily Activity Raw Score: 9 (12/10/21 1041)  AM-PAC Inpatient ADL T-Scale Score : 25.33 (12/10/21 1041)  ADL Inpatient CMS 0-100% Score: 79.59 (12/10/21 1041)  ADL Inpatient CMS G-Code Modifier : CL (12/10/21 1041)    Goals  Short term goals  Time Frame for Short term goals: d/c  Short term goal 1: Pt will complete functional transfer to ADL surface with max(A)x2--- ANDREI STEDY mod of 2 bed to chair 12/10/21  Short term goal 2: Pt will improve seated balance to CGA while completing ADL task-- not met, fluctuated from min to max sitting EOB 12/10/21  Short term goal 3: Pt will completed bed mobility with min(A)-- not met, mod supine to sit 12/10/21  Short term goal 4: pt will complete UB ADLs with setup-- not met max assist 12/10/21  Long term goals  Time Frame for Long term goals : LTG=STG  Patient Goals   Patient goals : pt did not state       Therapy Time   Individual Concurrent Group Co-treatment   Time In       1000   Time Out       1040   Minutes       40        Timed Code Treatment Minutes:  40 Minutes    Total Treatment Minutes:  [address] 53, 067 [facility]

## 2021-12-11 LAB
ANION GAP SERPL CALCULATED.3IONS-SCNC: 13 MMOL/L (ref 3–16)
BASOPHILS ABSOLUTE: 0 K/UL (ref 0–0.2)
BASOPHILS RELATIVE PERCENT: 0.7 %
BUN BLDV-MCNC: 23 MG/DL (ref 7–20)
CALCIUM SERPL-MCNC: 9.8 MG/DL (ref 8.3–10.6)
CHLORIDE BLD-SCNC: 112 MMOL/L (ref 99–110)
CO2: 22 MMOL/L (ref 21–32)
CREAT SERPL-MCNC: 1.1 MG/DL (ref 0.8–1.3)
EOSINOPHILS ABSOLUTE: 0.1 K/UL (ref 0–0.6)
EOSINOPHILS RELATIVE PERCENT: 1.6 %
GFR AFRICAN AMERICAN: >60
GFR NON-AFRICAN AMERICAN: >60
GLUCOSE BLD-MCNC: 105 MG/DL (ref 70–99)
GLUCOSE BLD-MCNC: 115 MG/DL (ref 70–99)
HCT VFR BLD CALC: 49.2 % (ref 40.5–52.5)
HEMOGLOBIN: 16.9 G/DL (ref 13.5–17.5)
LV EF: 58 %
LVEF MODALITY: NORMAL
LYMPHOCYTES ABSOLUTE: 0.9 K/UL (ref 1–5.1)
LYMPHOCYTES RELATIVE PERCENT: 17 %
MCH RBC QN AUTO: 32.3 PG (ref 26–34)
MCHC RBC AUTO-ENTMCNC: 34.4 G/DL (ref 31–36)
MCV RBC AUTO: 93.9 FL (ref 80–100)
MONOCYTES ABSOLUTE: 0.7 K/UL (ref 0–1.3)
MONOCYTES RELATIVE PERCENT: 13.6 %
NEUTROPHILS ABSOLUTE: 3.5 K/UL (ref 1.7–7.7)
NEUTROPHILS RELATIVE PERCENT: 67.1 %
PDW BLD-RTO: 14 % (ref 12.4–15.4)
PERFORMED ON: ABNORMAL
PLATELET # BLD: 108 K/UL (ref 135–450)
PMV BLD AUTO: 8.4 FL (ref 5–10.5)
POTASSIUM SERPL-SCNC: 3.5 MMOL/L (ref 3.5–5.1)
RBC # BLD: 5.24 M/UL (ref 4.2–5.9)
SODIUM BLD-SCNC: 147 MMOL/L (ref 136–145)
WBC # BLD: 5.2 K/UL (ref 4–11)

## 2021-12-11 PROCEDURE — 97129 THER IVNTJ 1ST 15 MIN: CPT

## 2021-12-11 PROCEDURE — 36415 COLL VENOUS BLD VENIPUNCTURE: CPT

## 2021-12-11 PROCEDURE — 80048 BASIC METABOLIC PNL TOTAL CA: CPT

## 2021-12-11 PROCEDURE — 93306 TTE W/DOPPLER COMPLETE: CPT

## 2021-12-11 PROCEDURE — 92526 ORAL FUNCTION THERAPY: CPT

## 2021-12-11 PROCEDURE — 2060000000 HC ICU INTERMEDIATE R&B

## 2021-12-11 PROCEDURE — 85025 COMPLETE CBC W/AUTO DIFF WBC: CPT

## 2021-12-11 PROCEDURE — 92507 TX SP LANG VOICE COMM INDIV: CPT

## 2021-12-11 PROCEDURE — 6360000002 HC RX W HCPCS: Performed by: INTERNAL MEDICINE

## 2021-12-11 PROCEDURE — 2500000003 HC RX 250 WO HCPCS: Performed by: INTERNAL MEDICINE

## 2021-12-11 PROCEDURE — 2580000003 HC RX 258: Performed by: INTERNAL MEDICINE

## 2021-12-11 RX ORDER — DEXTROSE, SODIUM CHLORIDE, AND POTASSIUM CHLORIDE 5; .45; .15 G/100ML; G/100ML; G/100ML
INJECTION INTRAVENOUS CONTINUOUS
Status: DISCONTINUED | OUTPATIENT
Start: 2021-12-11 | End: 2021-12-14 | Stop reason: HOSPADM

## 2021-12-11 RX ORDER — KETOROLAC TROMETHAMINE 30 MG/ML
15 INJECTION, SOLUTION INTRAMUSCULAR; INTRAVENOUS EVERY 6 HOURS PRN
Status: DISCONTINUED | OUTPATIENT
Start: 2021-12-11 | End: 2021-12-14 | Stop reason: HOSPADM

## 2021-12-11 RX ADMIN — POTASSIUM CHLORIDE, DEXTROSE MONOHYDRATE AND SODIUM CHLORIDE: 150; 5; 450 INJECTION, SOLUTION INTRAVENOUS at 19:54

## 2021-12-11 RX ADMIN — ENOXAPARIN SODIUM 40 MG: 100 INJECTION SUBCUTANEOUS at 10:38

## 2021-12-11 RX ADMIN — Medication 10 ML: at 08:37

## 2021-12-11 RX ADMIN — Medication 10 ML: at 00:08

## 2021-12-11 RX ADMIN — HYDRALAZINE HYDROCHLORIDE 10 MG: 20 INJECTION INTRAMUSCULAR; INTRAVENOUS at 16:39

## 2021-12-11 RX ADMIN — HYDRALAZINE HYDROCHLORIDE 10 MG: 20 INJECTION INTRAMUSCULAR; INTRAVENOUS at 08:38

## 2021-12-11 RX ADMIN — HYDRALAZINE HYDROCHLORIDE 10 MG: 20 INJECTION INTRAMUSCULAR; INTRAVENOUS at 00:11

## 2021-12-11 RX ADMIN — SODIUM CHLORIDE, PRESERVATIVE FREE 10 ML: 5 INJECTION INTRAVENOUS at 08:40

## 2021-12-11 RX ADMIN — SODIUM CHLORIDE, PRESERVATIVE FREE 10 ML: 5 INJECTION INTRAVENOUS at 16:39

## 2021-12-11 RX ADMIN — SODIUM CHLORIDE, PRESERVATIVE FREE 10 ML: 5 INJECTION INTRAVENOUS at 00:09

## 2021-12-11 ASSESSMENT — PAIN SCALES - GENERAL
PAINLEVEL_OUTOF10: 0

## 2021-12-11 ASSESSMENT — PAIN SCALES - WONG BAKER: WONGBAKER_NUMERICALRESPONSE: 0

## 2021-12-11 NOTE — PROGRESS NOTES
Speech  Language And Dysphagia Treatment Note    Name: Claria Baumgarten  : 1945  Medical Diagnosis: Aphasia [R47.01]  Hypokalemia [E87.6]  Troponin level elevated [R77.8]  Acute CVA (cerebrovascular accident) ([facility] [address] [I63.9]  Treatment Diagnosis: Oropharyngeal Dysphagia, Apraxia, Expressive Aphasia   Pain: 0/10 reported by Pt    Patient's response to therapy:  Pt awake and alert positioned upright in chair. Patient tracking therapist however, did not attempt to verbalize or use simple picture communication board when presented to him. Pt continues to demonstrate severely decreased ability to functionally communicate secondary to apraxia and aphasia. Modified Barium Swallow Study:  Modified Barium Swallow evaluation completed on 2021. Results indicate moderate/severe oropharyngeal dysphagia. Pt demonstrated deep laryngeal penetration and silent aspiration of thin liquids and deep laryngeal penetration of Mildly Thick (Nectar) Liquids that was not self clearing. Cued cough was weak and not effective. Although pt did not demonstrate aspiration of Moderately Thick (honey) Liquids or puree during this study moderately impaired oral and pharyngeal clearing was assessed with all textures which pt demonstrated difficulty clearing. He also demonstrates severely impaired oral phase of the swallow which impacts pt's ability to functionally accept PO. At this time pt demonstrates high risk for aspiration due to oral and pharyngeal phase deficits, new CVA and impaired reflexive/voltional cough. He also demonstrates high risk for malnutrition due to severity of dysphagia. See below for recommendations.    (See note for more details)     Dysphagia Treatment:  Current Diet Level: 1) NPO with ongoing assessment of PO tolerance at bedside and implementation of strategies prior to diet advance   2) Due to severity of dysphagia pt may benefit from discussion of code status and wishes re; alternative means of nutrition/hydration   Tolerance of Current Diet Level: N/A     Assessment of Texture Tolerance:  -Impressions:  Patient was able to follow simple commands and respond to yes/no questions with head nod or shake. He was able to point to items in his room when he needed things such as a tissue and lip balm. Oral  Cavity is very dry with reduced opening, minimal and decreased labial ROM, decreased labial seal and minimal volitional lingual movement. Completed oral care. Patient with thick stringy saliva throughout oral cavity which was cleared with oral care. He required verbal cues to initiate a volitional swallow. Initiation of swallow was moderately delayed  Completed trials of moderately Thick (honey) Liquids via tsp. During Modified Barium Swallow head rotation to the right assisted with pharyngeal clearing however, at the bedside pt was unable to consistently rotate head to the right and execute the swallow with moderate verbal and visual cues. Pt demonstrate difficulty taking material from spoon. Poor labial seal around spoon and inability to strip material from spoon. Patient presented with severely impaired oral phase with non functional bolus transfer and clearance of material , impaired labial seal effecting propulsion, poor manipulation of material.  He repeatedly elevated chin tipping head back to assist with bolus propulsion. , impaired oral clearing with multiple swallows to clear the oral cavity, oral stasis, suspected pharyngeal pooling with delayed swallow initiation and clinical signs of impaired pharyngeal clearing. Pt demonstrated s/s of pharyngeal residue with vocal quality changes, use of multiple delayed swallows (4-5) and intermittent delayed throat clearing. Pt is a high risk for aspiration/ aspiration pneumonia. Continue to recommend alternative means of nutrition for safety and to ensure nutritional and hydration needs are met. Dysphagia Goals, addressed this date:  1.   Pt will functionally tolerate trials of Moderately Thick (honey) Liquids and puree with SLP with no overt clinical s/s of aspiration/penetration (ongoing 12/11/2021)   2. Pt/family will demonstrate understanding of results Modified Barium Swallow Study, risk for aspiration, rationale for diet level and compensatory strategies (ongoing 12/11/2021)   3. Pt will utilize appropriate compensatory strategies as indicated with min with cues (head rotation to the right) (ongoing 12/11/2021)   4. Pt will demonstrate improved sensory motor function via targeted exercises and appropriate treatment modalities (ongoing 12/11/2021)   5. Determine alternative means of nutrition    Diet and Treatment Recommendations 12/11/2021: (Continue- spoke with nursing about recommendations. She will consult Dr Michelle Coe about these recommendations)  1) Pending decision re; pt POC- NPO with allowance of necessary PO meds crushed with puree, allow small bites of puree and small sips of Moderately Thick (honey) Liquids via tsp with RN with oral care to follow   2) If aggressive means of care are desired recommend consideration of alternative means of nutrition/hydration in conjunction with aggressive dysphagia tx. 3) If comfort care/less aggressive means are desired consider diet advance to Dysphagia I Pureed with Moderately Thick (honey) Liquids however, would suspect high risk for malnutrition and aspiration PNA based on new CVA with resulting dysphagia, apraxia, limited mobility and weak cough. Compensatory Strategies: Alternate solids/liquids , Check for pocketing of food L, Check for pocketing of food R, Effortful Swallows , Liquids by spoon only, Upright as possible with all PO intake , Small bites/sips , Remain upright 30-45 min , Total Feed ;Swallow 2-3 times for bite;  Throat clear and re-swallow every 1-2 bites; Oral care following all intake     Speech Language Treatment:  Impressions: Pt continues to demonstrate severe verbal apraxia and

## 2021-12-11 NOTE — PROGRESS NOTES
[address] [facility]                                            Advanced Care Planning Note. Purpose of Encounter: Advanced care planning in light of advanced age and multiple strokes with debility   Parties In Attendance: Patient, Sister  Marion Aguiar present in the room    Decisional Capacity: Yes, Partial sister is the POA   Subjective: Patient/family understand that this conversation is to address long term care goal  Objective:   CPR-No  Endotracheal intubation and mechanical Vent- no   defibrillation-no   PEG Placement - YES   Hemodialysis-No  Goals of Care Determination: Patient/POA   Code Status: DNR CC  Time spent on Advanced care Plannin minutes   Advanced Care Planning Documents: Completed advanced directives on chart, Marion Aguiar is the POA.     Lauren Birmingham MD  2021 1:04 PM

## 2021-12-11 NOTE — PROGRESS NOTES
Assessment complete. BP elevated labetalol given by prior RN. No SOB or any distress noted. Pt incontinent of urine. Pt cleaned changed and repositioned. Pt has no complaints of pain at this time. NIH 4 for aphasia and dysarthria ataxia in one limb and facial palsy. Increased rounding to assist with needs. Call light within reach, pt encouraged to call if any needs arise. No further requests at this time. Will continue to monitor.

## 2021-12-12 LAB
ANION GAP SERPL CALCULATED.3IONS-SCNC: 14 MMOL/L (ref 3–16)
BASOPHILS ABSOLUTE: 0 K/UL (ref 0–0.2)
BASOPHILS RELATIVE PERCENT: 0.5 %
BUN BLDV-MCNC: 28 MG/DL (ref 7–20)
CALCIUM SERPL-MCNC: 9.6 MG/DL (ref 8.3–10.6)
CHLORIDE BLD-SCNC: 115 MMOL/L (ref 99–110)
CO2: 17 MMOL/L (ref 21–32)
CREAT SERPL-MCNC: 1.2 MG/DL (ref 0.8–1.3)
EOSINOPHILS ABSOLUTE: 0.1 K/UL (ref 0–0.6)
EOSINOPHILS RELATIVE PERCENT: 1.8 %
GFR AFRICAN AMERICAN: >60
GFR NON-AFRICAN AMERICAN: 59
GLUCOSE BLD-MCNC: 108 MG/DL (ref 70–99)
GLUCOSE BLD-MCNC: 121 MG/DL (ref 70–99)
HCT VFR BLD CALC: 51.5 % (ref 40.5–52.5)
HEMOGLOBIN: 17.3 G/DL (ref 13.5–17.5)
LYMPHOCYTES ABSOLUTE: 1 K/UL (ref 1–5.1)
LYMPHOCYTES RELATIVE PERCENT: 18.5 %
MCH RBC QN AUTO: 31.8 PG (ref 26–34)
MCHC RBC AUTO-ENTMCNC: 33.5 G/DL (ref 31–36)
MCV RBC AUTO: 94.8 FL (ref 80–100)
MONOCYTES ABSOLUTE: 0.8 K/UL (ref 0–1.3)
MONOCYTES RELATIVE PERCENT: 13.4 %
NEUTROPHILS ABSOLUTE: 3.7 K/UL (ref 1.7–7.7)
NEUTROPHILS RELATIVE PERCENT: 65.8 %
PDW BLD-RTO: 14.5 % (ref 12.4–15.4)
PERFORMED ON: ABNORMAL
PLATELET # BLD: 113 K/UL (ref 135–450)
PLATELET SLIDE REVIEW: ADEQUATE
PMV BLD AUTO: 8.5 FL (ref 5–10.5)
POTASSIUM SERPL-SCNC: 4.1 MMOL/L (ref 3.5–5.1)
RBC # BLD: 5.44 M/UL (ref 4.2–5.9)
SLIDE REVIEW: ABNORMAL
SODIUM BLD-SCNC: 146 MMOL/L (ref 136–145)
WBC # BLD: 5.7 K/UL (ref 4–11)

## 2021-12-12 PROCEDURE — 85025 COMPLETE CBC W/AUTO DIFF WBC: CPT

## 2021-12-12 PROCEDURE — 2500000003 HC RX 250 WO HCPCS: Performed by: INTERNAL MEDICINE

## 2021-12-12 PROCEDURE — 36415 COLL VENOUS BLD VENIPUNCTURE: CPT

## 2021-12-12 PROCEDURE — 6360000002 HC RX W HCPCS: Performed by: INTERNAL MEDICINE

## 2021-12-12 PROCEDURE — 2580000003 HC RX 258: Performed by: INTERNAL MEDICINE

## 2021-12-12 PROCEDURE — 80048 BASIC METABOLIC PNL TOTAL CA: CPT

## 2021-12-12 PROCEDURE — 2060000000 HC ICU INTERMEDIATE R&B

## 2021-12-12 PROCEDURE — 6370000000 HC RX 637 (ALT 250 FOR IP): Performed by: INTERNAL MEDICINE

## 2021-12-12 RX ORDER — KETOTIFEN FUMARATE 0.35 MG/ML
1 SOLUTION/ DROPS OPHTHALMIC 2 TIMES DAILY PRN
Status: DISCONTINUED | OUTPATIENT
Start: 2021-12-12 | End: 2021-12-14 | Stop reason: HOSPADM

## 2021-12-12 RX ADMIN — SODIUM CHLORIDE, PRESERVATIVE FREE 10 ML: 5 INJECTION INTRAVENOUS at 08:18

## 2021-12-12 RX ADMIN — POTASSIUM CHLORIDE, DEXTROSE MONOHYDRATE AND SODIUM CHLORIDE: 150; 5; 450 INJECTION, SOLUTION INTRAVENOUS at 04:24

## 2021-12-12 RX ADMIN — Medication 10 ML: at 08:17

## 2021-12-12 RX ADMIN — SODIUM CHLORIDE, PRESERVATIVE FREE 10 ML: 5 INJECTION INTRAVENOUS at 18:02

## 2021-12-12 RX ADMIN — KETOROLAC TROMETHAMINE 15 MG: 30 INJECTION, SOLUTION INTRAMUSCULAR at 19:00

## 2021-12-12 RX ADMIN — Medication 10 ML: at 04:31

## 2021-12-12 RX ADMIN — HYDRALAZINE HYDROCHLORIDE 10 MG: 20 INJECTION INTRAMUSCULAR; INTRAVENOUS at 08:17

## 2021-12-12 RX ADMIN — ENOXAPARIN SODIUM 40 MG: 100 INJECTION SUBCUTANEOUS at 08:25

## 2021-12-12 RX ADMIN — KETOTIFEN FUMARATE 1 DROP: 0.35 SOLUTION/ DROPS OPHTHALMIC at 12:02

## 2021-12-12 RX ADMIN — HYDRALAZINE HYDROCHLORIDE 10 MG: 20 INJECTION INTRAMUSCULAR; INTRAVENOUS at 18:00

## 2021-12-12 RX ADMIN — Medication 10 ML: at 19:01

## 2021-12-12 ASSESSMENT — PAIN SCALES - GENERAL
PAINLEVEL_OUTOF10: 0

## 2021-12-12 ASSESSMENT — PAIN SCALES - WONG BAKER: WONGBAKER_NUMERICALRESPONSE: 0

## 2021-12-12 NOTE — PROGRESS NOTES
Department of Internal Medicine  General Internal Medicine   Progress Note      SUBJECTIVE: \"I do not want PEG , I am ready to go \"    History obtained from chart review, the patient and patient's sister and nursing staff   General ROS: positive for  - fatigue, malaise and weight loss  negative for - chills, fever or night sweats  Psychological ROS: positive for - anxiety, memory difficulties and sleep disturbances  negative for - hallucinations or hostility  Ophthalmic ROS: negative  Respiratory ROS: no cough, shortness of breath, or wheezing  Cardiovascular ROS: no chest pain or dyspnea on exertion  Gastrointestinal ROS: gross dysphagia   Genito-Urinary ROS: no dysuria, trouble voiding, or hematuria  Musculoskeletal ROS: negative  Neurological ROS: hemiplegia   Dermatological ROS: negative    OBJECTIVE      Medications      Current Facility-Administered Medications: ketotifen (ZADITOR) 0.025 % ophthalmic solution 1 drop, 1 drop, Both Eyes, BID PRN  ketorolac (TORADOL) injection 15 mg, 15 mg, IntraVENous, Q6H PRN  dextrose 5 % and 0.45 % NaCl with KCl 20 mEq infusion, , IntraVENous, Continuous  perflutren lipid microspheres (DEFINITY) injection 1.65 mg, 1.5 mL, IntraVENous, ONCE PRN  iopamidol (ISOVUE-370) 76 % injection 75 mL, 75 mL, IntraVENous, ONCE PRN  amLODIPine (NORVASC) tablet 10 mg, 10 mg, Oral, Daily  vitamin B-12 (CYANOCOBALAMIN) tablet 1,000 mcg, 1,000 mcg, Oral, Daily  senna (SENOKOT) tablet 17.2 mg, 2 tablet, Oral, Daily  lisinopril (PRINIVIL;ZESTRIL) tablet 40 mg, 40 mg, Oral, Daily  clopidogrel (PLAVIX) tablet 75 mg, 75 mg, Oral, Daily  pantoprazole (PROTONIX) tablet 40 mg, 40 mg, Oral, Daily  atorvastatin (LIPITOR) tablet 20 mg, 20 mg, Oral, Nightly  sodium chloride flush 0.9 % injection 10 mL, 10 mL, IntraVENous, 2 times per day  sodium chloride flush 0.9 % injection 10 mL, 10 mL, IntraVENous, PRN  0.9 % sodium chloride infusion, 25 mL, IntraVENous, PRN  ondansetron (ZOFRAN-ODT) disintegrating tablet 4 mg, 4 mg, Oral, Q8H PRN **OR** ondansetron (ZOFRAN) injection 4 mg, 4 mg, IntraVENous, Q6H PRN  magnesium hydroxide (MILK OF MAGNESIA) 400 MG/5ML suspension 30 mL, 30 mL, Oral, Daily PRN  enoxaparin (LOVENOX) injection 40 mg, 40 mg, SubCUTAneous, Daily  aspirin EC tablet 81 mg, 81 mg, Oral, Daily **OR** aspirin suppository 300 mg, 300 mg, Rectal, Daily  labetalol (NORMODYNE;TRANDATE) injection 10 mg, 10 mg, IntraVENous, Q10 Min PRN  hydrALAZINE (APRESOLINE) injection 10 mg, 10 mg, IntraVENous, Q6H PRN  0.9 % sodium chloride bolus, 500 mL, IntraVENous, PRN  potassium chloride (KLOR-CON M) extended release tablet 40 mEq, 40 mEq, Oral, PRN **OR** potassium bicarb-citric acid (EFFER-K) effervescent tablet 40 mEq, 40 mEq, Oral, PRN **OR** potassium chloride 10 mEq/100 mL IVPB (Peripheral Line), 10 mEq, IntraVENous, PRN    Physical      Vitals: BP (!) 158/83   Pulse 76   Temp 98.5 °F (36.9 °C) (Axillary)   Resp 18   Ht 5' 6\" (1.676 m)   Wt 167 lb 8.8 oz (76 kg)   SpO2 93%   BMI 27.04 kg/m²   Temp: Temp: 98.5 °F (36.9 °C)  Max: Temp  Av.8 °F (36.6 °C)  Min: 97.4 °F (36.3 °C)  Max: 98.5 °F (36.9 °C)  Respiration range:  Resp  Av.3  Min: 16  Max: 18  Pulse Range:  Pulse  Av.8  Min: 57  Max: 79  Blood pressure range:  Systolic (42FHE), TCL:472 , Min:146 , IQZ:013   , Diastolic (09FQH), MMX:09, Min:82, Max:103    SpO2  Av.2 %  Min: 93 %  Max: 98 %    Intake/Output Summary (Last 24 hours) at 2021 1716  Last data filed at 2021 1658  Gross per 24 hour   Intake 1586.86 ml   Output    Net 1586.86 ml       Vent settings:  Pulse  Av.2  Min: 57  Max: 91  Resp  Av.3  Min: 8  Max: 23  SpO2  Av.6 %  Min: 91 %  Max: 98 %    CONSTITUTIONAL:  fatigued, alert, cooperative, mild distress, appears stated age and normal weight  EYES:  Unremarkable   NECK:  Mild JVD  and supple, symmetrical, trachea midline  BACK:  symmetric and no curvature  LUNGS:  No increased work of breathing, good air exchange, clear to auscultation bilaterally, no crackles or wheezing  CARDIOVASCULAR:  normal apical pulses, regular rate and rhythm, normal S1 and S2 and no S3  ABDOMEN:  Soft BS = non tender   MUSCULOSKELETAL:  Trace edema   NEUROLOGIC:  Hemiplegia left with positive Babinski   SKIN:  Warm and dry  and no bruising or bleeding    Data      Recent Results (from the past 96 hour(s))   Comprehensive Metabolic Panel w/ Reflex to MG    Collection Time: 12/09/21  5:11 AM   Result Value Ref Range    Sodium 142 136 - 145 mmol/L    Potassium reflex Magnesium 3.3 (L) 3.5 - 5.1 mmol/L    Chloride 108 99 - 110 mmol/L    CO2 21 21 - 32 mmol/L    Anion Gap 13 3 - 16    Glucose 111 (H) 70 - 99 mg/dL    BUN 17 7 - 20 mg/dL    CREATININE 1.2 0.8 - 1.3 mg/dL    GFR Non- 59 (A) >60    GFR African American >60 >60    Calcium 9.4 8.3 - 10.6 mg/dL    Total Protein 6.9 6.4 - 8.2 g/dL    Albumin 3.8 3.4 - 5.0 g/dL    Albumin/Globulin Ratio 1.2 1.1 - 2.2    Total Bilirubin 0.8 0.0 - 1.0 mg/dL    Alkaline Phosphatase 52 40 - 129 U/L    ALT 12 10 - 40 U/L    AST 19 15 - 37 U/L   Hemoglobin A1c    Collection Time: 12/09/21  5:11 AM   Result Value Ref Range    Hemoglobin A1C 5.7 See comment %    eAG 116.9 mg/dL   Lipid panel - fasting    Collection Time: 12/09/21  5:11 AM   Result Value Ref Range    Cholesterol, Total 123 0 - 199 mg/dL    Triglycerides 79 0 - 150 mg/dL    HDL 37 (L) 40 - 60 mg/dL    LDL Calculated 70 <100 mg/dL    VLDL Cholesterol Calculated 16 Not Established mg/dL   CBC Auto Differential    Collection Time: 12/09/21  5:11 AM   Result Value Ref Range    WBC 5.7 4.0 - 11.0 K/uL    RBC 5.23 4.20 - 5.90 M/uL    Hemoglobin 16.7 13.5 - 17.5 g/dL    Hematocrit 48.6 40.5 - 52.5 %    MCV 93.1 80.0 - 100.0 fL    MCH 31.9 26.0 - 34.0 pg    MCHC 34.3 31.0 - 36.0 g/dL    RDW 13.8 12.4 - 15.4 %    Platelets 957 (L) 656 - 450 K/uL    MPV 9.0 5.0 - 10.5 fL    Neutrophils % 69.4 %    Lymphocytes % 17.8 %    Monocytes % 12.2 %    Eosinophils % 0.2 %    Basophils % 0.4 %    Neutrophils Absolute 4.0 1.7 - 7.7 K/uL    Lymphocytes Absolute 1.0 1.0 - 5.1 K/uL    Monocytes Absolute 0.7 0.0 - 1.3 K/uL    Eosinophils Absolute 0.0 0.0 - 0.6 K/uL    Basophils Absolute 0.0 0.0 - 0.2 K/uL   Magnesium    Collection Time: 12/09/21  5:11 AM   Result Value Ref Range    Magnesium 2.20 1.80 - 2.40 mg/dL   CBC Auto Differential    Collection Time: 12/10/21  5:36 AM   Result Value Ref Range    WBC 5.3 4.0 - 11.0 K/uL    RBC 5.19 4.20 - 5.90 M/uL    Hemoglobin 16.3 13.5 - 17.5 g/dL    Hematocrit 48.5 40.5 - 52.5 %    MCV 93.5 80.0 - 100.0 fL    MCH 31.4 26.0 - 34.0 pg    MCHC 33.5 31.0 - 36.0 g/dL    RDW 14.0 12.4 - 15.4 %    Platelets 548 (L) 742 - 450 K/uL    MPV 8.5 5.0 - 10.5 fL    Neutrophils % 65.1 %    Lymphocytes % 20.1 %    Monocytes % 13.1 %    Eosinophils % 1.2 %    Basophils % 0.5 %    Neutrophils Absolute 3.4 1.7 - 7.7 K/uL    Lymphocytes Absolute 1.1 1.0 - 5.1 K/uL    Monocytes Absolute 0.7 0.0 - 1.3 K/uL    Eosinophils Absolute 0.1 0.0 - 0.6 K/uL    Basophils Absolute 0.0 0.0 - 0.2 K/uL   Basic Metabolic Panel    Collection Time: 12/10/21  5:36 AM   Result Value Ref Range    Sodium 144 136 - 145 mmol/L    Potassium 3.4 (L) 3.5 - 5.1 mmol/L    Chloride 109 99 - 110 mmol/L    CO2 22 21 - 32 mmol/L    Anion Gap 13 3 - 16    Glucose 94 70 - 99 mg/dL    BUN 23 (H) 7 - 20 mg/dL    CREATININE 1.1 0.8 - 1.3 mg/dL    GFR Non-African American >60 >60    GFR African American >60 >60    Calcium 9.7 8.3 - 10.6 mg/dL   Basic Metabolic Panel    Collection Time: 12/11/21  6:34 AM   Result Value Ref Range    Sodium 147 (H) 136 - 145 mmol/L    Potassium 3.5 3.5 - 5.1 mmol/L    Chloride 112 (H) 99 - 110 mmol/L    CO2 22 21 - 32 mmol/L    Anion Gap 13 3 - 16    Glucose 105 (H) 70 - 99 mg/dL    BUN 23 (H) 7 - 20 mg/dL    CREATININE 1.1 0.8 - 1.3 mg/dL    GFR Non-African American >60 >60    GFR African American >60 >60    Calcium 9.8 8.3 - 10.6 mg/dL   CBC Auto Differential    Collection Time: 12/11/21  6:34 AM   Result Value Ref Range    WBC 5.2 4.0 - 11.0 K/uL    RBC 5.24 4.20 - 5.90 M/uL    Hemoglobin 16.9 13.5 - 17.5 g/dL    Hematocrit 49.2 40.5 - 52.5 %    MCV 93.9 80.0 - 100.0 fL    MCH 32.3 26.0 - 34.0 pg    MCHC 34.4 31.0 - 36.0 g/dL    RDW 14.0 12.4 - 15.4 %    Platelets 580 (L) 082 - 450 K/uL    MPV 8.4 5.0 - 10.5 fL    Neutrophils % 67.1 %    Lymphocytes % 17.0 %    Monocytes % 13.6 %    Eosinophils % 1.6 %    Basophils % 0.7 %    Neutrophils Absolute 3.5 1.7 - 7.7 K/uL    Lymphocytes Absolute 0.9 (L) 1.0 - 5.1 K/uL    Monocytes Absolute 0.7 0.0 - 1.3 K/uL    Eosinophils Absolute 0.1 0.0 - 0.6 K/uL    Basophils Absolute 0.0 0.0 - 0.2 K/uL   POCT Glucose    Collection Time: 12/11/21  8:07 PM   Result Value Ref Range    POC Glucose 115 (H) 70 - 99 mg/dl    Performed on ACCU-CHEK    CBC auto differential    Collection Time: 12/12/21 11:01 AM   Result Value Ref Range    WBC 5.7 4.0 - 11.0 K/uL    RBC 5.44 4.20 - 5.90 M/uL    Hemoglobin 17.3 13.5 - 17.5 g/dL    Hematocrit 51.5 40.5 - 52.5 %    MCV 94.8 80.0 - 100.0 fL    MCH 31.8 26.0 - 34.0 pg    MCHC 33.5 31.0 - 36.0 g/dL    RDW 14.5 12.4 - 15.4 %    Platelets 611 (L) 125 - 450 K/uL    MPV 8.5 5.0 - 10.5 fL    PLATELET SLIDE REVIEW Adequate     SLIDE REVIEW see below     Neutrophils % 65.8 %    Lymphocytes % 18.5 %    Monocytes % 13.4 %    Eosinophils % 1.8 %    Basophils % 0.5 %    Neutrophils Absolute 3.7 1.7 - 7.7 K/uL    Lymphocytes Absolute 1.0 1.0 - 5.1 K/uL    Monocytes Absolute 0.8 0.0 - 1.3 K/uL    Eosinophils Absolute 0.1 0.0 - 0.6 K/uL    Basophils Absolute 0.0 0.0 - 0.2 K/uL   Basic metabolic panel    Collection Time: 12/12/21 11:01 AM   Result Value Ref Range    Sodium 146 (H) 136 - 145 mmol/L    Potassium 4.1 3.5 - 5.1 mmol/L    Chloride 115 (H) 99 - 110 mmol/L    CO2 17 (L) 21 - 32 mmol/L    Anion Gap 14 3 - 16    Glucose 121 (H) 70 - 99 mg/dL    BUN 28 (H) 7 - 20 mg/dL    CREATININE 1.2 0.8 - 1.3 mg/dL    GFR Non- 59 (A) >60    GFR African American >60 >60    Calcium 9.6 8.3 - 10.6 mg/dL   POCT Glucose    Collection Time: 12/12/21  4:36 PM   Result Value Ref Range    POC Glucose 108 (H) 70 - 99 mg/dl    Performed on ACCU-CHEK        ASSESSMENT AND PLAN     Principal Problem:    Acute CVA (cerebrovascular accident) ([address]  Active Problems:    HTN (hypertension), benign    Dyslipidemia    Gastroesophageal reflux disease without esophagitis    Aphasia  Resolved Problems:    * No resolved hospital problems.  *    Discussed with sister  And nursing staff along with the patient at great length , in a dramatic reversal patient and his sister  Do not want PEG now and are requesting  Hospice consult now , I advised  And reassured them that would be a very montez and dignified decision and also in the best interest of the Patient , it was a good and long conversation and sister was appreciative at the end

## 2021-12-12 NOTE — PROGRESS NOTES
Department of Internal Medicine  General Internal Medicine   Progress Note      SUBJECTIVE: severe weakness gross failure to swallow     History obtained from chart review, the patient and patient's sister and nursing staff   General ROS: positive for  - fatigue, malaise and weight loss  negative for - chills, fever or night sweats  Psychological ROS: positive for - anxiety, memory difficulties and sleep disturbances  negative for - hallucinations or hostility  Ophthalmic ROS: negative  Respiratory ROS: no cough, shortness of breath, or wheezing  Cardiovascular ROS: no chest pain or dyspnea on exertion  Gastrointestinal ROS: gross dysphagia   Genito-Urinary ROS: no dysuria, trouble voiding, or hematuria  Musculoskeletal ROS: negative  Neurological ROS: hemiplegia   Dermatological ROS: negative    OBJECTIVE      Medications      Current Facility-Administered Medications: ketotifen (ZADITOR) 0.025 % ophthalmic solution 1 drop, 1 drop, Both Eyes, BID PRN  ketorolac (TORADOL) injection 15 mg, 15 mg, IntraVENous, Q6H PRN  dextrose 5 % and 0.45 % NaCl with KCl 20 mEq infusion, , IntraVENous, Continuous  perflutren lipid microspheres (DEFINITY) injection 1.65 mg, 1.5 mL, IntraVENous, ONCE PRN  iopamidol (ISOVUE-370) 76 % injection 75 mL, 75 mL, IntraVENous, ONCE PRN  amLODIPine (NORVASC) tablet 10 mg, 10 mg, Oral, Daily  vitamin B-12 (CYANOCOBALAMIN) tablet 1,000 mcg, 1,000 mcg, Oral, Daily  senna (SENOKOT) tablet 17.2 mg, 2 tablet, Oral, Daily  lisinopril (PRINIVIL;ZESTRIL) tablet 40 mg, 40 mg, Oral, Daily  clopidogrel (PLAVIX) tablet 75 mg, 75 mg, Oral, Daily  pantoprazole (PROTONIX) tablet 40 mg, 40 mg, Oral, Daily  atorvastatin (LIPITOR) tablet 20 mg, 20 mg, Oral, Nightly  sodium chloride flush 0.9 % injection 10 mL, 10 mL, IntraVENous, 2 times per day  sodium chloride flush 0.9 % injection 10 mL, 10 mL, IntraVENous, PRN  0.9 % sodium chloride infusion, 25 mL, IntraVENous, PRN  ondansetron (ZOFRAN-ODT) disintegrating tablet 4 mg, 4 mg, Oral, Q8H PRN **OR** ondansetron (ZOFRAN) injection 4 mg, 4 mg, IntraVENous, Q6H PRN  magnesium hydroxide (MILK OF MAGNESIA) 400 MG/5ML suspension 30 mL, 30 mL, Oral, Daily PRN  enoxaparin (LOVENOX) injection 40 mg, 40 mg, SubCUTAneous, Daily  aspirin EC tablet 81 mg, 81 mg, Oral, Daily **OR** aspirin suppository 300 mg, 300 mg, Rectal, Daily  labetalol (NORMODYNE;TRANDATE) injection 10 mg, 10 mg, IntraVENous, Q10 Min PRN  hydrALAZINE (APRESOLINE) injection 10 mg, 10 mg, IntraVENous, Q6H PRN  0.9 % sodium chloride bolus, 500 mL, IntraVENous, PRN  potassium chloride (KLOR-CON M) extended release tablet 40 mEq, 40 mEq, Oral, PRN **OR** potassium bicarb-citric acid (EFFER-K) effervescent tablet 40 mEq, 40 mEq, Oral, PRN **OR** potassium chloride 10 mEq/100 mL IVPB (Peripheral Line), 10 mEq, IntraVENous, PRN    Physical      Vitals: BP (!) 158/83   Pulse 76   Temp 98.5 °F (36.9 °C) (Axillary)   Resp 18   Ht 5' 6\" (1.676 m)   Wt 167 lb 8.8 oz (76 kg)   SpO2 93%   BMI 27.04 kg/m²   Temp: Temp: 98.5 °F (36.9 °C)  Max: Temp  Av.8 °F (36.6 °C)  Min: 97.4 °F (36.3 °C)  Max: 98.5 °F (36.9 °C)  Respiration range:  Resp  Av.3  Min: 16  Max: 18  Pulse Range:  Pulse  Av.8  Min: 57  Max: 79  Blood pressure range:  Systolic (87YQO), LWR:604 , Min:146 , XA   , Diastolic (91VGW), XAD:14, Min:82, Max:103    SpO2  Av.2 %  Min: 93 %  Max: 98 %    Intake/Output Summary (Last 24 hours) at 2021 1711  Last data filed at 2021 1658  Gross per 24 hour   Intake 1586.86 ml   Output    Net 1586.86 ml       Vent settings:  Pulse  Av.2  Min: 57  Max: 91  Resp  Av.3  Min: 8  Max: 23  SpO2  Av.6 %  Min: 91 %  Max: 98 %    CONSTITUTIONAL:  fatigued, alert, cooperative, mild distress, appears stated age and normal weight  EYES:  Unremarkable   NECK:  Mild JVD  and supple, symmetrical, trachea midline  BACK:  symmetric and no curvature  LUNGS:  No increased work of breathing, good air exchange, clear to auscultation bilaterally, no crackles or wheezing  CARDIOVASCULAR:  normal apical pulses, regular rate and rhythm, normal S1 and S2 and no S3  ABDOMEN:  Soft BS = non tender   MUSCULOSKELETAL:  Trace edema   NEUROLOGIC:  Hemiplegia left with positive Babinski   SKIN:  Warm and dry  and no bruising or bleeding    Data      Recent Results (from the past 96 hour(s))   Comprehensive Metabolic Panel w/ Reflex to MG    Collection Time: 12/09/21  5:11 AM   Result Value Ref Range    Sodium 142 136 - 145 mmol/L    Potassium reflex Magnesium 3.3 (L) 3.5 - 5.1 mmol/L    Chloride 108 99 - 110 mmol/L    CO2 21 21 - 32 mmol/L    Anion Gap 13 3 - 16    Glucose 111 (H) 70 - 99 mg/dL    BUN 17 7 - 20 mg/dL    CREATININE 1.2 0.8 - 1.3 mg/dL    GFR Non- 59 (A) >60    GFR African American >60 >60    Calcium 9.4 8.3 - 10.6 mg/dL    Total Protein 6.9 6.4 - 8.2 g/dL    Albumin 3.8 3.4 - 5.0 g/dL    Albumin/Globulin Ratio 1.2 1.1 - 2.2    Total Bilirubin 0.8 0.0 - 1.0 mg/dL    Alkaline Phosphatase 52 40 - 129 U/L    ALT 12 10 - 40 U/L    AST 19 15 - 37 U/L   Hemoglobin A1c    Collection Time: 12/09/21  5:11 AM   Result Value Ref Range    Hemoglobin A1C 5.7 See comment %    eAG 116.9 mg/dL   Lipid panel - fasting    Collection Time: 12/09/21  5:11 AM   Result Value Ref Range    Cholesterol, Total 123 0 - 199 mg/dL    Triglycerides 79 0 - 150 mg/dL    HDL 37 (L) 40 - 60 mg/dL    LDL Calculated 70 <100 mg/dL    VLDL Cholesterol Calculated 16 Not Established mg/dL   CBC Auto Differential    Collection Time: 12/09/21  5:11 AM   Result Value Ref Range    WBC 5.7 4.0 - 11.0 K/uL    RBC 5.23 4.20 - 5.90 M/uL    Hemoglobin 16.7 13.5 - 17.5 g/dL    Hematocrit 48.6 40.5 - 52.5 %    MCV 93.1 80.0 - 100.0 fL    MCH 31.9 26.0 - 34.0 pg    MCHC 34.3 31.0 - 36.0 g/dL    RDW 13.8 12.4 - 15.4 %    Platelets 244 (L) 532 - 450 K/uL    MPV 9.0 5.0 - 10.5 fL    Neutrophils % 69.4 %    Lymphocytes % 17.8 %    Monocytes % 12.2 %    Eosinophils % 0.2 %    Basophils % 0.4 %    Neutrophils Absolute 4.0 1.7 - 7.7 K/uL    Lymphocytes Absolute 1.0 1.0 - 5.1 K/uL    Monocytes Absolute 0.7 0.0 - 1.3 K/uL    Eosinophils Absolute 0.0 0.0 - 0.6 K/uL    Basophils Absolute 0.0 0.0 - 0.2 K/uL   Magnesium    Collection Time: 12/09/21  5:11 AM   Result Value Ref Range    Magnesium 2.20 1.80 - 2.40 mg/dL   CBC Auto Differential    Collection Time: 12/10/21  5:36 AM   Result Value Ref Range    WBC 5.3 4.0 - 11.0 K/uL    RBC 5.19 4.20 - 5.90 M/uL    Hemoglobin 16.3 13.5 - 17.5 g/dL    Hematocrit 48.5 40.5 - 52.5 %    MCV 93.5 80.0 - 100.0 fL    MCH 31.4 26.0 - 34.0 pg    MCHC 33.5 31.0 - 36.0 g/dL    RDW 14.0 12.4 - 15.4 %    Platelets 155 (L) 120 - 450 K/uL    MPV 8.5 5.0 - 10.5 fL    Neutrophils % 65.1 %    Lymphocytes % 20.1 %    Monocytes % 13.1 %    Eosinophils % 1.2 %    Basophils % 0.5 %    Neutrophils Absolute 3.4 1.7 - 7.7 K/uL    Lymphocytes Absolute 1.1 1.0 - 5.1 K/uL    Monocytes Absolute 0.7 0.0 - 1.3 K/uL    Eosinophils Absolute 0.1 0.0 - 0.6 K/uL    Basophils Absolute 0.0 0.0 - 0.2 K/uL   Basic Metabolic Panel    Collection Time: 12/10/21  5:36 AM   Result Value Ref Range    Sodium 144 136 - 145 mmol/L    Potassium 3.4 (L) 3.5 - 5.1 mmol/L    Chloride 109 99 - 110 mmol/L    CO2 22 21 - 32 mmol/L    Anion Gap 13 3 - 16    Glucose 94 70 - 99 mg/dL    BUN 23 (H) 7 - 20 mg/dL    CREATININE 1.1 0.8 - 1.3 mg/dL    GFR Non-African American >60 >60    GFR African American >60 >60    Calcium 9.7 8.3 - 10.6 mg/dL   Basic Metabolic Panel    Collection Time: 12/11/21  6:34 AM   Result Value Ref Range    Sodium 147 (H) 136 - 145 mmol/L    Potassium 3.5 3.5 - 5.1 mmol/L    Chloride 112 (H) 99 - 110 mmol/L    CO2 22 21 - 32 mmol/L    Anion Gap 13 3 - 16    Glucose 105 (H) 70 - 99 mg/dL    BUN 23 (H) 7 - 20 mg/dL    CREATININE 1.1 0.8 - 1.3 mg/dL    GFR Non-African American >60 >60    GFR African American >60 >60    Calcium 9.8 8.3 - 10.6 mg/dL   CBC Auto Differential    Collection Time: 12/11/21  6:34 AM   Result Value Ref Range    WBC 5.2 4.0 - 11.0 K/uL    RBC 5.24 4.20 - 5.90 M/uL    Hemoglobin 16.9 13.5 - 17.5 g/dL    Hematocrit 49.2 40.5 - 52.5 %    MCV 93.9 80.0 - 100.0 fL    MCH 32.3 26.0 - 34.0 pg    MCHC 34.4 31.0 - 36.0 g/dL    RDW 14.0 12.4 - 15.4 %    Platelets 799 (L) 961 - 450 K/uL    MPV 8.4 5.0 - 10.5 fL    Neutrophils % 67.1 %    Lymphocytes % 17.0 %    Monocytes % 13.6 %    Eosinophils % 1.6 %    Basophils % 0.7 %    Neutrophils Absolute 3.5 1.7 - 7.7 K/uL    Lymphocytes Absolute 0.9 (L) 1.0 - 5.1 K/uL    Monocytes Absolute 0.7 0.0 - 1.3 K/uL    Eosinophils Absolute 0.1 0.0 - 0.6 K/uL    Basophils Absolute 0.0 0.0 - 0.2 K/uL   POCT Glucose    Collection Time: 12/11/21  8:07 PM   Result Value Ref Range    POC Glucose 115 (H) 70 - 99 mg/dl    Performed on ACCU-CHEK    CBC auto differential    Collection Time: 12/12/21 11:01 AM   Result Value Ref Range    WBC 5.7 4.0 - 11.0 K/uL    RBC 5.44 4.20 - 5.90 M/uL    Hemoglobin 17.3 13.5 - 17.5 g/dL    Hematocrit 51.5 40.5 - 52.5 %    MCV 94.8 80.0 - 100.0 fL    MCH 31.8 26.0 - 34.0 pg    MCHC 33.5 31.0 - 36.0 g/dL    RDW 14.5 12.4 - 15.4 %    Platelets 448 (L) 418 - 450 K/uL    MPV 8.5 5.0 - 10.5 fL    PLATELET SLIDE REVIEW Adequate     SLIDE REVIEW see below     Neutrophils % 65.8 %    Lymphocytes % 18.5 %    Monocytes % 13.4 %    Eosinophils % 1.8 %    Basophils % 0.5 %    Neutrophils Absolute 3.7 1.7 - 7.7 K/uL    Lymphocytes Absolute 1.0 1.0 - 5.1 K/uL    Monocytes Absolute 0.8 0.0 - 1.3 K/uL    Eosinophils Absolute 0.1 0.0 - 0.6 K/uL    Basophils Absolute 0.0 0.0 - 0.2 K/uL   Basic metabolic panel    Collection Time: 12/12/21 11:01 AM   Result Value Ref Range    Sodium 146 (H) 136 - 145 mmol/L    Potassium 4.1 3.5 - 5.1 mmol/L    Chloride 115 (H) 99 - 110 mmol/L    CO2 17 (L) 21 - 32 mmol/L    Anion Gap 14 3 - 16    Glucose 121 (H) 70 - 99 mg/dL    BUN 28 (H) 7 - 20 mg/dL    CREATININE 1.2 0.8 - 1.3 mg/dL    GFR Non- 59 (A) >60    GFR African American >60 >60    Calcium 9.6 8.3 - 10.6 mg/dL   POCT Glucose    Collection Time: 12/12/21  4:36 PM   Result Value Ref Range    POC Glucose 108 (H) 70 - 99 mg/dl    Performed on ACCU-CHEK        ASSESSMENT AND PLAN     Principal Problem:    Acute CVA (cerebrovascular accident) ([address]  Active Problems:    HTN (hypertension), benign    Dyslipidemia    Gastroesophageal reflux disease without esophagitis    Aphasia  Resolved Problems:    * No resolved hospital problems.  *    Discussed with sister in detail , she wants to proceed with PEG and needs IV nutrition in the mean time GI consult placed , he is DNR CC other wise discussed with nursing staff in detail

## 2021-12-12 NOTE — CONSULTS
Allergies  No Known Allergies     Family history     Family History   Problem Relation Age of Onset    Heart Disease Father 64    Diabetes Mother     Heart Disease Mother     High Blood Pressure Mother     Breast Cancer Maternal Grandmother     Breast Cancer Sister         Social   Social History     Tobacco Use    Smoking status: Never Smoker    Smokeless tobacco: Never Used   Substance Use Topics    Alcohol use: No     Alcohol/week: 0.0 standard drinks          Review of Systems    Unable verbalize answers. Physical Exam  Blood pressure (!) 168/105, pulse 67, temperature 98.1 °F (36.7 °C), temperature source Oral, resp. rate 16, height 5' 6\" (1.676 m), weight 167 lb 8.8 oz (76 kg), SpO2 94 %. General appearance: alert, cooperative, no distress, appears stated age  Anicteric, No Jaundice  Head: Normocephalic, without obvious abnormality  Lungs: clear to auscultation bilaterally  Heart: regular rate and rhythm  Abdomen: soft, non-tender, non-distended, Bowel sounds normal.    Extremities: no edema  Skin: warm and dry  Neuro: awake but aphasic. Data Review:    Recent Labs     12/09/21  0511 12/10/21  0536 12/11/21  0634   WBC 5.7 5.3 5.2   HGB 16.7 16.3 16.9   HCT 48.6 48.5 49.2   MCV 93.1 93.5 93.9   * 101* 108*     Recent Labs     12/09/21  0511 12/10/21  0536 12/11/21  0634    144 147*   K 3.3* 3.4* 3.5    109 112*   CO2 21 22 22   BUN 17 23* 23*   CREATININE 1.2 1.1 1.1     Recent Labs     12/09/21  0511   AST 19   ALT 12   BILITOT 0.8   ALKPHOS 52     No results for input(s): LIPASE, AMYLASE in the last 72 hours. No results for input(s): PROTIME, INR in the last 72 hours. No results for input(s): PTT in the last 72 hours. No results for input(s): OCCULTBLD in the last 72 hours. Assessment / Plan :    1. Gi: oropharyngeal dysphagia s/p cva. Wbc nl, afeb, abd benign, inr nl.    Rec;  - ok for asa (rectal)  - need off plavix 7 days for peg (appears has been off here s/p cva)  - thrombocytopenia evaluation per primary team  - sister/poa and pt agree proceed peg; I discussed risk/benefit today and they wish proceed. - pegs not placed on weekends; gi team will reeval Monday am peg timing  - will not round on pt Sunday unless called, call if needed.        Clyde Mccarthy MD , MD  [facility] and [facility]

## 2021-12-12 NOTE — PLAN OF CARE
Problem: Skin Integrity:  Goal: Absence of new skin breakdown  Description: Absence of new skin breakdown  Outcome: Ongoing  Note: Check, change, cleanse skin, apply powder/barrier cream and reposition every 2 hours and as needed. No new s/s of skin breakdown. Problem: HEMODYNAMIC STATUS  Goal: Patient has stable vital signs and fluid balance  Outcome: Ongoing  Note: NPO. Started Continuous IV fluids to maintain hydration until evaluated by GI for possible tube feed placement. Problem: ACTIVITY INTOLERANCE/IMPAIRED MOBILITY  Goal: Mobility/activity is maintained at optimum level for patient  Outcome: Ongoing  Note: Pt uses wheelchair for locomotion as baseline, able to assist with turn/repositioning. Encouraged pt to be involved with personal care such as washing face and hands. Problem: COMMUNICATION IMPAIRMENT  Goal: Ability to express needs and understand communication  Outcome: Ongoing  Note: Aphasic. Worked with using simple words and paper and pencil to improve pt/nurse communication. Problem: Neurological  Goal: Maximum potential motor/sensory/cognitive function  Outcome: Ongoing     Problem: Respiratory:  Goal: Ability to maintain a clear airway will improve  Description: Ability to maintain a clear airway will improve  Outcome: Ongoing  Note: LS CTA A&P, encouraged cough and deep breathing with repositioning. Goal: Absence of aspiration  Description: Absence of aspiration  Outcome: Ongoing  Note: Provide suction to remove oral secretions d/t pt unable to expectorate or swallow.      Problem: Safety:  Goal: Ability to demonstrate good, daily oral hygiene techniques will improve  Description: Ability to demonstrate good, daily oral hygiene techniques will improve  Outcome: Ongoing  Note: Provided oral care with suction and lip moisturizing

## 2021-12-12 NOTE — PROGRESS NOTES
Assessment complete. Vitals:    12/11/21 1945   BP: (!) 148/93   Pulse: 79   Resp: 16   Temp: 98 °F (36.7 °C)   SpO2: 94%      No SOB or any distress noted. Neuro check completed and NIH is 4 at this time. FSBG 115 will assess BID. Pt is more interactive. Call light within reach, pt encouraged to call if any needs arise. No further requests at this time. Will continue to monitor.

## 2021-12-12 NOTE — PROGRESS NOTES
Pt and sister/POA Evelia have decided to not have peg tube placed. They had requested to have indefininate IV fluids for nutrition. I explained that is not possible as it's not a long term solution to not eating. Further discussed current full code status as this indicates peg tube placement, intubation, CPR, and defibrillation in case of respiratory/cardiac arrest. Pt and POA states this is not what pt wants, would prefer to have comfort foods, change code status to [facility] and go home with hospice care. Hospitalist notified of pt wishes at this time.

## 2021-12-13 LAB
ANION GAP SERPL CALCULATED.3IONS-SCNC: 12 MMOL/L (ref 3–16)
BASOPHILS ABSOLUTE: 0 K/UL (ref 0–0.2)
BASOPHILS RELATIVE PERCENT: 0.5 %
BUN BLDV-MCNC: 24 MG/DL (ref 7–20)
CALCIUM SERPL-MCNC: 9.4 MG/DL (ref 8.3–10.6)
CHLORIDE BLD-SCNC: 116 MMOL/L (ref 99–110)
CO2: 18 MMOL/L (ref 21–32)
CREAT SERPL-MCNC: 1.1 MG/DL (ref 0.8–1.3)
EOSINOPHILS ABSOLUTE: 0.1 K/UL (ref 0–0.6)
EOSINOPHILS RELATIVE PERCENT: 3.4 %
GFR AFRICAN AMERICAN: >60
GFR NON-AFRICAN AMERICAN: >60
GLUCOSE BLD-MCNC: 100 MG/DL (ref 70–99)
HCT VFR BLD CALC: 48.3 % (ref 40.5–52.5)
HEMOGLOBIN: 16.5 G/DL (ref 13.5–17.5)
LYMPHOCYTES ABSOLUTE: 1.1 K/UL (ref 1–5.1)
LYMPHOCYTES RELATIVE PERCENT: 25.9 %
MCH RBC QN AUTO: 32.3 PG (ref 26–34)
MCHC RBC AUTO-ENTMCNC: 34.2 G/DL (ref 31–36)
MCV RBC AUTO: 94.4 FL (ref 80–100)
MONOCYTES ABSOLUTE: 0.7 K/UL (ref 0–1.3)
MONOCYTES RELATIVE PERCENT: 15.3 %
NEUTROPHILS ABSOLUTE: 2.4 K/UL (ref 1.7–7.7)
NEUTROPHILS RELATIVE PERCENT: 54.9 %
PDW BLD-RTO: 14.4 % (ref 12.4–15.4)
PLATELET # BLD: 100 K/UL (ref 135–450)
PMV BLD AUTO: 8.7 FL (ref 5–10.5)
POTASSIUM SERPL-SCNC: 4.2 MMOL/L (ref 3.5–5.1)
RBC # BLD: 5.12 M/UL (ref 4.2–5.9)
SODIUM BLD-SCNC: 146 MMOL/L (ref 136–145)
WBC # BLD: 4.3 K/UL (ref 4–11)

## 2021-12-13 PROCEDURE — 92507 TX SP LANG VOICE COMM INDIV: CPT

## 2021-12-13 PROCEDURE — 2580000003 HC RX 258: Performed by: INTERNAL MEDICINE

## 2021-12-13 PROCEDURE — 99232 SBSQ HOSP IP/OBS MODERATE 35: CPT | Performed by: PSYCHIATRY & NEUROLOGY

## 2021-12-13 PROCEDURE — 2500000003 HC RX 250 WO HCPCS: Performed by: INTERNAL MEDICINE

## 2021-12-13 PROCEDURE — 85025 COMPLETE CBC W/AUTO DIFF WBC: CPT

## 2021-12-13 PROCEDURE — 2060000000 HC ICU INTERMEDIATE R&B

## 2021-12-13 PROCEDURE — 97129 THER IVNTJ 1ST 15 MIN: CPT

## 2021-12-13 PROCEDURE — 92526 ORAL FUNCTION THERAPY: CPT

## 2021-12-13 PROCEDURE — 80048 BASIC METABOLIC PNL TOTAL CA: CPT

## 2021-12-13 PROCEDURE — 6360000002 HC RX W HCPCS: Performed by: INTERNAL MEDICINE

## 2021-12-13 PROCEDURE — 6370000000 HC RX 637 (ALT 250 FOR IP): Performed by: INTERNAL MEDICINE

## 2021-12-13 PROCEDURE — 36415 COLL VENOUS BLD VENIPUNCTURE: CPT

## 2021-12-13 RX ORDER — HYDRALAZINE HYDROCHLORIDE 20 MG/ML
20 INJECTION INTRAMUSCULAR; INTRAVENOUS EVERY 6 HOURS PRN
Status: DISCONTINUED | OUTPATIENT
Start: 2021-12-13 | End: 2021-12-14 | Stop reason: HOSPADM

## 2021-12-13 RX ADMIN — HYDRALAZINE HYDROCHLORIDE 10 MG: 20 INJECTION INTRAMUSCULAR; INTRAVENOUS at 05:18

## 2021-12-13 RX ADMIN — HYDRALAZINE HYDROCHLORIDE 20 MG: 20 INJECTION INTRAMUSCULAR; INTRAVENOUS at 23:25

## 2021-12-13 RX ADMIN — HYDRALAZINE HYDROCHLORIDE 20 MG: 20 INJECTION INTRAMUSCULAR; INTRAVENOUS at 12:55

## 2021-12-13 RX ADMIN — POTASSIUM CHLORIDE, DEXTROSE MONOHYDRATE AND SODIUM CHLORIDE: 150; 5; 450 INJECTION, SOLUTION INTRAVENOUS at 00:19

## 2021-12-13 RX ADMIN — KETOROLAC TROMETHAMINE 15 MG: 30 INJECTION, SOLUTION INTRAMUSCULAR at 12:56

## 2021-12-13 RX ADMIN — Medication 10 ML: at 12:55

## 2021-12-13 RX ADMIN — Medication 10 ML: at 23:25

## 2021-12-13 RX ADMIN — KETOTIFEN FUMARATE 1 DROP: 0.35 SOLUTION/ DROPS OPHTHALMIC at 08:13

## 2021-12-13 RX ADMIN — ASPIRIN 300 MG: 300 SUPPOSITORY RECTAL at 12:00

## 2021-12-13 RX ADMIN — ENOXAPARIN SODIUM 40 MG: 100 INJECTION SUBCUTANEOUS at 12:00

## 2021-12-13 ASSESSMENT — PAIN SCALES - GENERAL
PAINLEVEL_OUTOF10: 0
PAINLEVEL_OUTOF10: 6
PAINLEVEL_OUTOF10: 0

## 2021-12-13 NOTE — PROGRESS NOTES
Physical Therapy  Yanelis Gambino    Chart reviewed. PT attempted to see patient for follow up treatment. Patient currently declining OOB mobility or transfer to chair - shaking head \"no\" to respond. Patient pending palliative care/hospice consult. Plan to continue to follow for PT needs as indicated. RN notified.     Jorge Andrew PT, DPT [id number]

## 2021-12-13 NOTE — PROGRESS NOTES
Progress Note - Dr. Michelle Coe - Internal Medicine  PCP: Carmen Ramirez, OLGA - CNP [facility] [place] [address] [phone]    Hospital Day: 5  Code Status: DNR-CC  Current Diet: Diet NPO Exceptions are: Other (Specify); Specify Other Exceptions: allow meds crushed as able with applesauce, allow small bites of puree and moderately thick liquids with RN supervision        CC: follow up on medical issues    Subjective:   Karan Desai is a 67 y.o. male. Pt seen and examined  Chart reviewed since last visit, labs and imaging below        Events of weekend noted  Pt decided against PEG  I confirmed this decision with him this morning  He would like hospice eval      He denies chest pain, denies shortness of breath, denies nausea,  denies emesis. 10 system Review of Systems is reviewed with patient, and pertinent positives are noted in HPI above . Otherwise, Review of systems is negative. I have reviewed the patient's medical and social history in detail and updated the computerized patient record. To recap: He  has a past medical history of Cerebrovascular disease, Hyperlipidemia, Hypertension, Osteoarthritis, Risk for falls, and Unspecified cerebral artery occlusion with cerebral infarction. . He  has a past surgical history that includes hernia repair and Nose surgery. Erleen Bolden He  reports that he has never smoked. He has never used smokeless tobacco. He reports that he does not drink alcohol and does not use drugs. .        Active Hospital Problems    Diagnosis Date Noted    Aphasia [R47.01]     Acute CVA (cerebrovascular accident) ([facility] [address] [I63.9] 12/08/2021    Gastroesophageal reflux disease without esophagitis [K21.9] 07/12/2016    Dyslipidemia [E78.5] 12/16/2014    HTN (hypertension), benign [I10] 11/08/2012       Current Facility-Administered Medications: hydrALAZINE (APRESOLINE) injection 20 mg, 20 mg, IntraVENous, Q6H PRN  ketotifen (ZADITOR) 0.025 % ophthalmic solution 1 drop, 1 drop, Both Eyes, BID PRN  ketorolac (TORADOL) injection 15 mg, 15 mg, IntraVENous, Q6H PRN  dextrose 5 % and 0.45 % NaCl with KCl 20 mEq infusion, , IntraVENous, Continuous  perflutren lipid microspheres (DEFINITY) injection 1.65 mg, 1.5 mL, IntraVENous, ONCE PRN  iopamidol (ISOVUE-370) 76 % injection 75 mL, 75 mL, IntraVENous, ONCE PRN  amLODIPine (NORVASC) tablet 10 mg, 10 mg, Oral, Daily  vitamin B-12 (CYANOCOBALAMIN) tablet 1,000 mcg, 1,000 mcg, Oral, Daily  senna (SENOKOT) tablet 17.2 mg, 2 tablet, Oral, Daily  lisinopril (PRINIVIL;ZESTRIL) tablet 40 mg, 40 mg, Oral, Daily  clopidogrel (PLAVIX) tablet 75 mg, 75 mg, Oral, Daily  pantoprazole (PROTONIX) tablet 40 mg, 40 mg, Oral, Daily  atorvastatin (LIPITOR) tablet 20 mg, 20 mg, Oral, Nightly  sodium chloride flush 0.9 % injection 10 mL, 10 mL, IntraVENous, 2 times per day  sodium chloride flush 0.9 % injection 10 mL, 10 mL, IntraVENous, PRN  0.9 % sodium chloride infusion, 25 mL, IntraVENous, PRN  ondansetron (ZOFRAN-ODT) disintegrating tablet 4 mg, 4 mg, Oral, Q8H PRN **OR** ondansetron (ZOFRAN) injection 4 mg, 4 mg, IntraVENous, Q6H PRN  magnesium hydroxide (MILK OF MAGNESIA) 400 MG/5ML suspension 30 mL, 30 mL, Oral, Daily PRN  enoxaparin (LOVENOX) injection 40 mg, 40 mg, SubCUTAneous, Daily  aspirin EC tablet 81 mg, 81 mg, Oral, Daily **OR** aspirin suppository 300 mg, 300 mg, Rectal, Daily  labetalol (NORMODYNE;TRANDATE) injection 10 mg, 10 mg, IntraVENous, Q10 Min PRN  0.9 % sodium chloride bolus, 500 mL, IntraVENous, PRN  potassium chloride (KLOR-CON M) extended release tablet 40 mEq, 40 mEq, Oral, PRN **OR** potassium bicarb-citric acid (EFFER-K) effervescent tablet 40 mEq, 40 mEq, Oral, PRN **OR** potassium chloride 10 mEq/100 mL IVPB (Peripheral Line), 10 mEq, IntraVENous, PRN         Objective:  BP (!) 159/71   Pulse 60   Temp 98.1 °F (36.7 °C) (Axillary)   Resp 16   Ht 5' 6\" (1.676 m)   Wt 167 lb 8.8 oz (76 kg)   SpO2 98%   BMI 27.04 kg/m²      Patient Vitals for the past 24 hrs:   BP Temp Temp src Pulse Resp SpO2   12/13/21 0800 (!) 159/71 98.1 °F (36.7 °C) Axillary 60 16 98 %   12/13/21 0441 (!) 185/103 98.6 °F (37 °C) Axillary 54 16 96 %   12/12/21 2330 (!) 155/90 97.5 °F (36.4 °C) Oral 62 16 94 %   12/12/21 2102    60 16    12/12/21 1950 (!) 163/80 98.7 °F (37.1 °C) Oral 70 16 95 %   12/12/21 1750 (!) 167/104 97.5 °F (36.4 °C) Oral 73 18 94 %   12/12/21 1701 (!) 158/83 98.5 °F (36.9 °C) Axillary  18 93 %   12/12/21 1157 (!) 146/82 97.6 °F (36.4 °C) Axillary 76 16 98 %     No data found.         Intake/Output Summary (Last 24 hours) at 12/13/2021 0819  Last data filed at 12/12/2021 1909  Gross per 24 hour   Intake 1739.44 ml   Output    Net 1739.44 ml         Physical Exam:   Vitals as above  General appearance: alert, appears stated age and cooperative    Head: Normocephalic, without obvious abnormality, atraumatic    Lungs: clear to auscultation bilaterally    Heart: regular rate and rhythm, S1, S2 normal, no murmur    Abdomen: soft, non-tender; bowel sounds normal; no masses, no organomegaly    Extremities: extremities normal, atraumatic, no cyanosis, no edema      Labs:  Lab Results   Component Value Date    WBC 4.3 12/13/2021    HGB 16.5 12/13/2021    HCT 48.3 12/13/2021     (L) 12/13/2021    CHOL 123 12/09/2021    TRIG 79 12/09/2021    HDL 37 (L) 12/09/2021    ALT 12 12/09/2021    AST 19 12/09/2021     (H) 12/13/2021    K 4.2 12/13/2021     (H) 12/13/2021    CREATININE 1.1 12/13/2021    BUN 24 (H) 12/13/2021    CO2 18 (L) 12/13/2021    TSH 3.98 02/26/2015    PSA 1.68 01/29/2016    INR 0.96 12/08/2021    LABA1C 5.7 12/09/2021    LABMICR Not Indicated 01/11/2021     Lab Results   Component Value Date    QDWYFGZ 610 (H) 12/08/2021    TROPONINI 0.03 (H) 12/08/2021       Recent Imaging Results are Reviewed:  Echo Complete    Result Date: 12/11/2021  Transthoracic Echocardiography Report (TTE)  Demographics   Patient Name       Karen Shira CARRANZA Date of Study      12/11/2021         Gender              Male   Patient Number     [id number]         Date of Birth       1945   Visit Number       [id number]          Age                 67 year(s)   Accession Number   [id number]         Room Number         8315   Corporate ID       [id number]           Sonographer         Darion Parikh RVT, BS   Ordering Physician Maricarmen Morillo,  Interpreting        Param Neves MD MD                 Physician  Procedure Type of Study   TTE procedure:ECHOCARDIOGRAM COMPLETE 2D W DOPPLER W COLOR. Procedure Date Date: 12/11/2021 Start: 01:37 PM Study Location: [facility] - Echo Lab Technical Quality: Adequate visualization Indications:CVA. Patient Status: Routine Contrast Medium: Bubble Study. Amount - 10 ml Height: 66 inches Weight: 178 pounds BSA: 1.9 m2 BMI: 28.73 kg/m2 HR: 65 bpm BP: 161/71 mmHg  Conclusions   Summary  Normal left ventricle size, wall thickness, and systolic function with an  estimated ejection fraction of 55-60%. No regional wall motion abnormalities  are seen. Global longitudinal strain: -21% (normal). Normal diastolic filling pattern for age. The mitral valve leaflets are slightly thickened with normal leaflet  mobility. Mild aortic stenosis with a peak velocity of 2.55 m/s and a mean pressure  gradient of 15 mmHg. A bubble study was performed and fails to show evidence of shunting. Signature   ------------------------------------------------------------------  Electronically signed by Param Neves MD (Interpreting  physician) on 12/11/2021 at 03:08 PM  ------------------------------------------------------------------   Findings   Left Ventricle  Normal left ventricle size, wall thickness, and systolic function with an  estimated ejection fraction of 55-60%. No regional wall motion abnormalities  are seen. Average E/e': 8.2.   Global longitudinal strain: -21% (normal). Normal diastolic filling pattern for age. Mitral Valve  The mitral valve leaflets are slightly thickened with normal leaflet  mobility. No evidence of mitral regurgitation or stenosis. Mild mitral annular calcification. Left Atrium  The left atrium is normal in size. Aortic Valve  No evidence of aortic valve regurgitation. Mild aortic stenosis with a peak velocity of 2.55 m/s and a mean pressure  gradient of 15 mmHg. Tricuspid aortic valve. Aorta  The aortic root is normal in size. Right Ventricle  The right ventricle is normal in size and function. TAPSE: 2.84 cm. RV s' velocity: 24.6 cm/s. Tricuspid Valve  The tricuspid valve is normal in structure and function. There is no  significant tricuspid valve regurgitation or stenosis. Right Atrium  The right atrial size is normal.   Pulmonic Valve  The pulmonic valve is not well visualized. There is no evidence of pulmonic valve regurgitation or stenosis. Pericardial Effusion  No pericardial effusion noted. Pleural Effusion  No pleural effusion. Miscellaneous  The inferior vena cava appears normal in size with normal respiratory  variation. A bubble study was performed and fails to show evidence of shunting. Unable to estimate pulmonary artery pressure secondary to incomplete TR jet  envelope.   M-Mode/2D Measurements (cm)   LV Diastolic Dimension: 5.11 cm LV Systolic Dimension: 7.27 cm  LV Septum Diastolic: 3.18 cm  LV PW Diastolic: 1 cm           AO Root Dimension: 3.6 cm                                  LA Dimension: 4.1 cm                                  LA Area: 21.4 cm2  LVOT: 2 cm                      LA volume/Index: 57.03 ml /30 ml/m2  Doppler Measurements   AV Peak Velocity: 255 cm/s     MV Peak E-Wave: 59.6 cm/s  AV Peak Gradient: 26.01 mmHg   MV Peak A-Wave: 84.4 cm/s  AV Mean Gradient: 15 mmHg      MV E/A Ratio: 0.71  LVOT Peak Velocity: 125 cm/s  AV Area (Continuity):1.47 cm2 MV Deceleration Time: 311 msec  E' Septal Velocity: 5.87 cm/s  E' Lateral Velocity: 9.57 cm/s  PV Peak Velocity: 113 cm/s  PV Peak Gradient: 5.11 mmHg   Aortic Valve   Peak Velocity: 255 cm/s     Mean Velocity: 180 cm/s  Peak Gradient: 26.01 mmHg   Mean Gradient: 15 mmHg  Area (continuity): 1.47 cm2  AV VTI: 50.3 cm  Aorta   Aortic Root: 3.6 cm  LVOT Diameter: 2 cm      CT HEAD WO CONTRAST    Result Date: 12/8/2021  EXAMINATION: CT OF THE HEAD WITHOUT CONTRAST  12/8/2021 11:48 am TECHNIQUE: CT of the head was performed without the administration of intravenous contrast. Dose modulation, iterative reconstruction, and/or weight based adjustment of the mA/kV was utilized to reduce the radiation dose to as low as reasonably achievable. COMPARISON: Head CT 07/08/2021 HISTORY: ORDERING SYSTEM PROVIDED HISTORY: speech trouble TECHNOLOGIST PROVIDED HISTORY: Reason for exam:-> speech trouble Has a \"code stroke\" or \"stroke alert\" been called?-> yes Decision Support Exception - unselect if not a suspected or confirmed emergency medical condition->Emergency Medical Condition (MA) FINDINGS: BRAIN/VENTRICLES: There is no acute intracranial hemorrhage, mass effect or midline shift. No abnormal extra-axial fluid collection. The gray-white differentiation is maintained without evidence of an acute infarct. There is prominence of the ventricles and sulci due to global parenchymal volume loss. There are nonspecific areas of hypoattenuation within the periventricular and subcortical white matter, which likely represent chronic microvascular ischemic change. Remote lacunar stroke bilateral thalamus and left caudate lobe. ORBITS: The visualized portion of the orbits demonstrate no acute abnormality. SINUSES: The visualized paranasal sinuses and mastoid air cells demonstrate no acute abnormality. SOFT TISSUES/SKULL: No acute abnormality of the visualized skull or soft tissues. 1. No acute intracranial abnormality.  2. Remote lacunar stroke bilateral thalamus and left caudate lobe. 3. Senescent changes. 4.  White matter hypoattenuation described is typical of microvascular ischemic disease or as sequela of dysmyelinating/demyelinating processes. Per stroke alert protocol, the at Dr. Leonard Arellano radiology any Anabella Gerard results were called by Dr. Shravan Benites to [facility] on 12/8/2021 at 12:13. CT CERVICAL SPINE WO CONTRAST    Result Date: 12/8/2021  EXAMINATION: CT OF THE CERVICAL SPINE WITHOUT CONTRAST 12/8/2021 11:48 am TECHNIQUE: CT of the cervical spine was performed without the administration of intravenous contrast. Multiplanar reformatted images are provided for review. Dose modulation, iterative reconstruction, and/or weight based adjustment of the mA/kV was utilized to reduce the radiation dose to as low as reasonably achievable. COMPARISON: 07/08/2021 HISTORY: ORDERING SYSTEM PROVIDED HISTORY: stroke alert, fall TECHNOLOGIST PROVIDED HISTORY: Reason for exam:->stroke alert, fall Decision Support Exception - unselect if not a suspected or confirmed emergency medical condition->Emergency Medical Condition (MA) FINDINGS: BONES/ALIGNMENT: There is no acute fracture or traumatic malalignment. DEGENERATIVE CHANGES: Multilevel degenerative changes. SOFT TISSUES: There is no prevertebral soft tissue swelling. No acute abnormality of the cervical spine. XR CHEST PORTABLE    Result Date: 12/8/2021  EXAMINATION: ONE XRAY VIEW OF THE CHEST 12/8/2021 12:25 pm COMPARISON: Chest radiograph 4-7-2019 HISTORY: ORDERING SYSTEM PROVIDED HISTORY: dizziness TECHNOLOGIST PROVIDED HISTORY: Reason for exam:->dizziness FINDINGS: Lung volumes are low. No pulmonary consolidation, effusion or pneumothorax. There is borderline cardiac enlargement. Mild tortuosity of the thoracic aorta. 1. Borderline cardiomegaly. 2.   No evidence of pulmonary edema.      CTA HEAD NECK W CONTRAST    Result Date: 12/8/2021  EXAMINATION: CTA OF THE HEAD AND NECK WITH CONTRAST 12/8/2021 11:48 am: TECHNIQUE: CTA of the head and neck was performed with the administration of intravenous contrast. Multiplanar reformatted images are provided for review. MIP images are provided for review. Stenosis of the internal carotid arteries measured using NASCET criteria. Dose modulation, iterative reconstruction, and/or weight based adjustment of the mA/kV was utilized to reduce the radiation dose to as low as reasonably achievable. COMPARISON: Noncontrast CT brain 12/08/2021 HISTORY: ORDERING SYSTEM PROVIDED HISTORY: stroke like sx TECHNOLOGIST PROVIDED HISTORY: Reason for exam:->stroke like sx Decision Support Exception - unselect if not a suspected or confirmed emergency medical condition->Emergency Medical Condition (MA) FINDINGS: CTA NECK: AORTIC ARCH/ARCH VESSELS: Mild calcified atherosclerotic plaque is present within the aortic arch. The origins of the great vessels are normal.  There is no significant stenosis of the innominate or subclavian arteries. CAROTID ARTERIES: The common carotid arteries are normal in appearance. There is no significant stenosis or dissection. There is calcified and noncalcified atherosclerotic plaque at the origins of the internal carotid arteries. There is a 50% stenosis within the proximal right internal carotid artery based on NASCET criteria. There is noncalcified atherosclerotic plaque extending into the lumen of the right internal carotid artery. There is no significant stenosis of the left internal carotid artery evident based on NASCET criteria. VERTEBRAL ARTERIES: There is a mild stenosis at the origin of the right vertebral artery. The vertebral arteries are otherwise unremarkable. SOFT TISSUES: In the lung apices are clear. There is no pathologically enlarged lymphadenopathy identified. There is no soft tissue mass evident.  The parotid glands and submandibular glands are normal. BONES: No lytic or blastic osseous lesions are identified. 3D surface reformations were performed and concur with the above findings. CTA HEAD: ANTERIOR CIRCULATION: Atherosclerotic calcifications are present within the cavernous segments of the internal carotid arteries. There is no significant stenosis or aneurysm evident. The anterior and middle cerebral arteries are normal.  No significant stenosis or aneurysm is identified. POSTERIOR CIRCULATION: The distal vertebral arteries are normal.  The basilar artery is normal.  There is no significant stenosis or aneurysm identified. There is a fetal origin of the right posterior cerebral artery, an anatomic variant. The left posterior cerebral artery is normal. OTHER: No dural venous sinus thrombosis on this non-dedicated study. BRAIN: There is no mass effect or midline shift. There is no vascular malformation identified. 3D surface reformations were performed and concur with the above findings. This scan was analyzed using [employer]. [employer] contact LVO. Identification of suspected findings is not for diagnostic use beyond notification. Viz LVO is limited to analysis of imaging data and should not be used in-lieu of full patient evaluation or relied upon to make or confirm diagnosis. 1. No large vessel occlusion, significant stenosis or cerebral aneurysm identified. 2. Calcified and noncalcified atherosclerotic plaque at the origin of the right internal carotid artery resulting in a 50% stenosis based on NASCET criteria. There is noncalcified atherosclerotic plaque extending into the lumen of the right internal carotid artery which could predispose to distal emboli. 3. Mild stenosis at the origin of the right vertebral artery. MRI brain without contrast    Result Date: 12/8/2021  EXAMINATION: MRI OF THE BRAIN WITHOUT CONTRAST  12/8/2021 7:45 pm TECHNIQUE: Multiplanar multisequence MRI of the brain was performed without the administration of intravenous contrast. COMPARISON: None.  HISTORY: ORDERING SYSTEM PROVIDED HISTORY: TIA TECHNOLOGIST PROVIDED HISTORY: Reason for exam:->TIA Reason for Exam: started at 10 PM last night with trouble with speech and communication. FINDINGS: INTRACRANIAL STRUCTURES/VENTRICLES: There is volume loss with mild chronic white matter microvascular ischemic change. Multiple remote lacunar infarcts are present within the deep gray matter of both cerebral hemispheres. There is a chronic lacunar infarct within the cerebellum, on the left hand side. Normal expected signal voids are present within the vessels at the base of skull. No acute intracranial mass, shift, or bleed is identified. There is an acute lacunar infarct within the right mid corona radiata as well as a probable subacute lacunar infarct within the left mid corona radiata. ORBITS: The visualized portion of the orbits demonstrate no acute abnormality. SINUSES: There is mucosal thickening of the right maxillary antrum. BONES/SOFT TISSUES: The bone marrow signal intensity appears normal. The soft tissues demonstrate no acute abnormality. Acute lacunar infarct within the right mid corona radiata. Likely subacute lacunar infarct within the contralateral mid corona radiata. The findings were sent to the Radiology Results [address] at 8:53 pm on 12/8/2021to be communicated to a licensed caregiver. Fluoroscopy modified barium swallow with video    Result Date: 12/9/2021  EXAMINATION: MODIFIED BARIUM SWALLOW WAS PERFORMED IN CONJUNCTION WITH SPEECH PATHOLOGY SERVICES TECHNIQUE: Fluoroscopic evaluation of the swallowing mechanism was performed using cineradiography with multiple consistency of barium product in conjunction with speech pathology services. FLUOROSCOPY DOSE AND TYPE OR TIME AND EXPOSURES: 2 minutes 18 seconds fluoroscopy time was utilized for the study with 18 cine loops obtained.  COMPARISON: None HISTORY: ORDERING SYSTEM PROVIDED HISTORY: swallowing TECHNOLOGIST PROVIDED HISTORY: Reason for exam:->swallowing benign -Established problem. Uncontrolled. 159/71  Plan: increase iv hydralazine dose    Dyslipidemia -Established problem. Stable. Plan: Continue present orders/plan. Gastroesophageal reflux disease without esophagitis  Plan: Continue present orders/plan. Aphasia -Established problem. Stable. Persists. 2/2 cva  Plan: Continue present orders/plan. Disp - palliative care/hospice to see.        (Please note that portions of this note were completed with a voice recognition program.  Efforts were made to edit the dictations but occasionally words are mis-transcribed.)        Toby Mckee MD  12/13/2021

## 2021-12-13 NOTE — PROGRESS NOTES
Assessment complete. VSS no SOB or any distress noted. Repositioned pt declined oral care at this time lower lip is sore, planned for later. No complaints of pain. Pt is clean and dry at this time. Call light within reach, pt encouraged to call if any needs arise. No further requests at this time. Will continue to monitor.

## 2021-12-13 NOTE — CONSULTS
PALLIATIVE MEDICINE CONSULTATION     Patient name:Deepak Ya   YXZ:[id number]    GWD:913  Room/Bed:[facility]3384/3384-01   LOS: 5 days         Date of consult:2021    Consult Information  Palliative Medicine Consult performed by: OLGA Maldonado CNP      Inpatient consult to Palliative Care  Consult performed by: OLGA Maldonado CNP  Consult ordered by: Ericka Velez MD  Reason for consult: [address]         Number of admissions past 12 months: 0      ASSESSMENT/RECOMMENDATIONS     67 y.o. male with Dysphagia and Aphasia s/p CVA      Symptom Management:  1. Dysphagia- pt failed swallow eval, pt and sister both agree no PEG tube their mother had PEG tube and sister states that she suffered for 7 years before she finally . 2. Aphasia- pt able to write and nod his head is has communicated to multiple providers that he doesn't want aggressive measures   3. Goals of Care- talked to pts sister who is his POA she states that she and pt discussed and he spelled it out for her that he wants no feeding tube and wants to go to Hospice on [address] in [place] if possible. Will reach out to [facility]. Pt is DNRCC at baseline. Patient/Family Goals of Care :    talked to pts sister who is his POA she states that she and pt discussed and he spelled it out for her that he wants no feeding tube and wants to go to Hospice on [address] in [place] if possible. Will reach out to [facility]. Pt is DNRCC at baseline. Disposition/Discharge Plan:   Hospice consult    Advance Directives:  · Surrogate Decision Maker: Mignon-sister  · Code status:  DNR-CC    Case discussed with: patient, floor RN, Dr Noah Bell  Thank you for allowing us to participate in the care of this patient. HISTORY     CC: Dr Noah Bell  HPI: The patient is a 67 y.o. male with admit with cva despite asa/plavix, has now failed speech eval. Had prior cva's as well.   Pt sister and Ezekiel Garcia at bedside; pt alert but unable give verbal answers but writes answers    Palliative Medicine SymptomScreening/ROS:  Review of Systems -   History obtained from chart review and unobtainable from patient due to language barrier     Patient unable to complete full ROS due to current cognitive status. Information that is obtained from nursing and chart.      Pain:    Home med list and hospital medications reviewed in chart as of 12/13/2021     EXAM     Vitals:    12/13/21 0800   BP: (!) 159/71   Pulse: 60   Resp: 16   Temp: 98.1 °F (36.7 °C)   SpO2: 98%       Physical Examination:   General appearance - chronically ill appearing  Mental status - aphasia  Neck - supple, no significant adenopathy  Chest - clear to auscultation, no wheezes, rales or rhonchi, symmetric air entry  Abdomen - soft, nontender, nondistended, no masses or organomegaly  Musculoskeletal - no joint tenderness, deformity or swelling  Skin - normal coloration and turgor, no rashes, no suspicious skin lesions noted        Current labs in the epic chart reviewed as of 12/13/2021   Review of previous notes, admits, labs, radiology and testing relevant to this consult done in this chart today 12/13/2021      Total time: 70 minutes  >50% of time spent counseling patient at bedside or POA/family member if applicable , reviewing information and discussing care, coordinating with care team  Signed By: Electronically signed by OLGA Wills CNP on 12/13/2021 at 10:07 AM  Palliative Medicine   [phone]    December 13, 2021

## 2021-12-13 NOTE — PROGRESS NOTES
Reassessment completed. Pt incontinent at this time. Pt cleaned, changed and repositioned. Oral care completed. Pt given half a popsicle with active suctioning. Pt tolerated the popsicle with very small and slow swallowing. No coughing or clearing of the throat noted. Will continue to monitor.

## 2021-12-13 NOTE — PROGRESS NOTES
Mark Elliott  Neurology Follow-up  [facility] Neurology    Date of Service: 12/13/2021    Subjective:   CC: Follow up today regarding: Acute aphasia and new stroke    Events noted. Chart and lab reviewed. I was asked to see the patient again by his nurse request regarding patient and his POA's decision for hospice. Discussed the case with the patient in details as well as his Sister Yusra Khan over the phone. According to his sister, has been having poor quality of life since his last stroke few years ago. He is bounded to his wheelchair. family history includes Breast Cancer in his maternal grandmother and sister; Diabetes in his mother; Heart Disease in his mother; Heart Disease (age of onset: 64) in his father; High Blood Pressure in his mother.     Past Medical History:   Diagnosis Date    Cerebrovascular disease 2007    Right leg weakness    Hyperlipidemia     Hypertension     Osteoarthritis     Risk for falls 06/09/2017    YO [age]    Unspecified cerebral artery occlusion with cerebral infarction      Current Facility-Administered Medications   Medication Dose Route Frequency Provider Last Rate Last Admin    hydrALAZINE (APRESOLINE) injection 20 mg  20 mg IntraVENous Q6H PRN Toby Mckee MD        ketotifen (ZADITOR) 0.025 % ophthalmic solution 1 drop  1 drop Both Eyes BID PRN Karis Nava MD   1 drop at 12/13/21 0813    ketorolac (TORADOL) injection 15 mg  15 mg IntraVENous Q6H PRN Karis Nava MD   15 mg at 12/12/21 1900    dextrose 5 % and 0.45 % NaCl with KCl 20 mEq infusion   IntraVENous Continuous Karis Nava MD 75 mL/hr at 12/13/21 0019 New Bag at 12/13/21 0019    perflutren lipid microspheres (DEFINITY) injection 1.65 mg  1.5 mL IntraVENous ONCE PRN Toby Mckee MD        iopamidol (ISOVUE-370) 76 % injection 75 mL  75 mL IntraVENous ONCE PRN Catheryn Collet, MD        amLODIPine (NORVASC) tablet 10 mg  10 mg Oral Daily MD Raphael Stapleton vitamin B-12 (CYANOCOBALAMIN) tablet 1,000 mcg  1,000 mcg Oral Daily Lukas Davey MD        [facility]) tablet 17.2 mg  2 tablet Oral Daily Lukas Davey MD        lisinopril (PRINIVIL;ZESTRIL) tablet 40 mg  40 mg Oral Daily Lukas Davey MD        clopidogrel (PLAVIX) tablet 75 mg  75 mg Oral Daily Lukas Davey MD        pantoprazole (PROTONIX) tablet 40 mg  40 mg Oral Daily Lukas Davey MD        atorvastatin (LIPITOR) tablet 20 mg  20 mg Oral Nightly Lukas Davey MD        sodium chloride flush 0.9 % injection 10 mL  10 mL IntraVENous 2 times per day Lukas Davey MD   10 mL at 12/12/21 1901    sodium chloride flush 0.9 % injection 10 mL  10 mL IntraVENous PRN Lukas Davey MD   10 mL at 12/12/21 1802    0.9 % sodium chloride infusion  25 mL IntraVENous PRN Lukas Davey MD        ondansetron (ZOFRAN-ODT) disintegrating tablet 4 mg  4 mg Oral Q8H PRN Lukas Davey MD        Or    ondansetron [facility] injection 4 mg  4 mg IntraVENous Q6H PRN Lukas Davey MD        magnesium hydroxide (MILK OF MAGNESIA) 400 MG/5ML suspension 30 mL  30 mL Oral Daily PRN Lukas Davey MD        enoxaparin (LOVENOX) injection 40 mg  40 mg SubCUTAneous Daily Lukas Davey MD   40 mg at 12/12/21 0825    aspirin EC tablet 81 mg  81 mg Oral Daily Lukas Davey MD        Or    aspirin suppository 300 mg  300 mg Rectal Daily Lukas Davey MD   300 mg at 12/09/21 1812    labetalol (NORMODYNE;TRANDATE) injection 10 mg  10 mg IntraVENous Q10 Min PRN Lukas Davey MD   10 mg at 12/10/21 0813    0.9 % sodium chloride bolus  500 mL IntraVENous PRN Lukas Davey MD        potassium chloride Alleene Hove M) extended release tablet 40 mEq  40 mEq Oral PRN Lukas Davey MD        Or    potassium bicarb-citric acid (EFFER-K) effervescent tablet 40 mEq  40 mEq Oral PRN Lukas Davey MD        Or    potassium chloride 10 mEq/100 mL IVPB (Peripheral Line)  10 mEq IntraVENous PRN Alexia Carter MD         No Known Allergies   reports that he has never smoked. He has never used smokeless tobacco. He reports that he does not drink alcohol and does not use drugs. Objective:  Exam:   Constitutional:   Vitals:    12/12/21 2330 12/13/21 0441 12/13/21 0800 12/13/21 1145   BP: (!) 155/90 (!) 185/103 (!) 159/71 (!) 174/98   Pulse: 62 54 60 63   Resp: 16 16 16 16   Temp: 97.5 °F (36.4 °C) 98.6 °F (37 °C) 98.1 °F (36.7 °C) 98 °F (36.7 °C)   TempSrc: Oral Axillary Axillary Axillary   SpO2: 94% 96% 98% 95%   Weight:       Height:         General appearance:  Normal development and appear in no acute distress. Mental Status: The patient is awake alert  Nonverbal  Can follow direction  The same facial droop, poor tongue protrusion and right-sided weakness  No increased tone today  Exam unchanged compared to last          Data:  LABS:   Lab Results   Component Value Date     12/13/2021    K 4.2 12/13/2021    K 3.3 12/09/2021     12/13/2021    CO2 18 12/13/2021    BUN 24 12/13/2021    CREATININE 1.1 12/13/2021    GFRAA >60 12/13/2021    GFRAA >60 11/15/2012    LABGLOM >60 12/13/2021    GLUCOSE 100 12/13/2021    GLUCOSE 102 05/09/2017    MG 2.20 12/09/2021    CALCIUM 9.4 12/13/2021     Lab Results   Component Value Date    WBC 4.3 12/13/2021    RBC 5.12 12/13/2021    RBC 5.49 05/09/2017    HGB 16.5 12/13/2021    HCT 48.3 12/13/2021    MCV 94.4 12/13/2021    RDW 14.4 12/13/2021     12/13/2021     Lab Results   Component Value Date    INR 0.96 12/08/2021    PROTIME 10.8 12/08/2021         I reviewed blood testing and other test results and discussed results with the patient      Impression: The patient is about the same. Not improving. Acute aphasia with right-sided weakness, severe secondary to new ischemic right MCA stroke. Thromboembolic versus cardioembolic.   Hypertension, not controlled  Hyperlipidemia  Hypokalemia  Prediabetes        Recommendation      I discussed the case with the patient and his sister over the phone regarding prognosis and outcome. Both the patient and sister who is his POA, wanted to proceed with hospice at this time despite our discussion. Aspiration precautions  Hospice consultation  Blood pressure control  Nothing to add from neurology at this point  Discussed with his nurse  We will sign off. Wu Dodge MD   [phone]      This dictation was generated by voice recognition computer software. Although all attempts are made to edit the dictation for accuracy, there may be errors in the transcription that are not intended.

## 2021-12-13 NOTE — CONSULTS
Palliative Care:     Per family discussion and choice they have selected [facility]. Consult faxed and called to liaison Loretta Champagne as of 10:20. Roxanna PATEL notified.

## 2021-12-13 NOTE — PROGRESS NOTES
Occupational Therapy  Venancio Hwang    Per chart patient and family declining PEG tube and have decided to proceed with hospice. Will d/c OT services at this time due to hospice admission. Thank you,  Estefani Saleem.  [address]

## 2021-12-13 NOTE — CARE COORDINATION
Discharge planning note:    Hospice consult on hold at this time while Neurology clarifies prognosis with patient and sister. UPDATE  Hospice consult completed and patient wants to move forward. [facility] working with [address] to get him transferred over there. He has been in [place] previously but this may not be an option with hospice. Hopeful for dc tomorrow.       Emir Craig MSN, BSN, RN  Case Management   [phone]

## 2021-12-13 NOTE — PLAN OF CARE
Problem: Falls - Risk of:  Goal: Will remain free from falls  Description: Will remain free from falls  Outcome: Ongoing  Note: Pt CVA pt and is chairfast. Up with 2 assist to chair. In bed overnight. Problem: Falls - Risk of:  Goal: Absence of physical injury  Description: Absence of physical injury  Outcome: Ongoing  Note: Frequent rounding to assist with needs. Problem: Skin Integrity:  Goal: Will show no infection signs and symptoms  Description: Will show no infection signs and symptoms  12/13/2021 0137 by Graciela Mcmanus RN  Outcome: Ongoing  Note: Temperature monitored with vitals signs. Skin reassessed for swelling and redness with repositioning and during checks for incontinence. Problem: Skin Integrity:  Goal: Absence of new skin breakdown  Description: Absence of new skin breakdown  12/13/2021 0137 by Graciela Mcmanus RN  Outcome: Ongoing  Note: No new skin breakdown or red bony prominences occuring this shift. Pt repositioned with rounding using pillow support. Protective wipes used PRN. Zinc paste used on groin and under scrotum as they are slightly red. Skin will be kept clean and dry. Ambulation and turning encouraged. Problem: HEMODYNAMIC STATUS  Goal: Patient has stable vital signs and fluid balance  12/13/2021 0137 by Graciela Mcmanus RN  Outcome: Ongoing  Note: Pt vital signs will be assessed per floor protocol and as needed. Medications will be administered accordingly to keep vitals signs within parameters that are normal or that are chosen by MD.       Problem: ACTIVITY INTOLERANCE/IMPAIRED MOBILITY  Goal: Mobility/activity is maintained at optimum level for patient  12/13/2021 0137 by Graciela Mcmanus RN  Outcome: Ongoing  Note: Pt able to assist with repositioning by hold on to the rails and pulling himself up while bed is in Trendelenburg or pt being turned on to side for brief change.       Problem: COMMUNICATION IMPAIRMENT  Goal: Ability to express needs and

## 2021-12-13 NOTE — PROGRESS NOTES
desired recommend consideration of alternative means of nutrition/hydration in conjunction with aggressive dysphagia tx. 3) If comfort care/less aggressive means are desired consider diet advance to Dysphagia I Pureed with Moderately Thick (honey) Liquids however, would suspect high risk for malnutrition and aspiration PNA based on new CVA with resulting dysphagia, apraxia, limited mobility and weak cough. Tolerance of Current Diet Level: Per chart and discussion with RN, pt has been receiving popsicles for comfort with oral care and suctioning following     Assessment of Texture Tolerance:  -Impressions:  Pt was positioned upright in bed, continues to appropriately respond to yes/no questions. Trials of ice chips, puree and Moderately Thick (honey) Liquids were provided. Pt was able to self feed Moderately Thick (honey) Liquids via cup but was unable to self feed trials via tsp. Oral mechanism examination; improved lingual protrusion, unable to volitionally lateralize tongue, decreased labial opening, decreased labial ROM/coordination-consistent with oral apraxia. Improved ability to strip bolus from tsp when fed by SLP was noted. Dysphagia continues to be characterized by decreased labial seal, severely impaired A-P propulsion (continues to attempt to tip head back and \"slurp\" the bolus), decreased bolus manipulation, oral pocketing, suspected pharyngeal pooling, decreased laryngeal elevation and suspected impaired pharyngeal clearing. Pt was noted to utilize multiple weak swallows to clear. Pharyngeal stasis vs penetration/aspiration was suspected characterized by, Intermittent throat clearing, cough, multiple swallows and slight vocal quality change. O2 saturations remained stable throughout evaluation. Pt demonstrating minimal improvement in swallow function however, continues to demonstrate moderate/severe oropharyngeal dysphagia.  Pt required greater than 45 minutes to consume less than 4oz of liquid and less than 4 oz of puree. At this time recommend initiation of Dysphagia I Pureed with Moderately Thick (honey) Liquids, meds crushed as able with puree (1;1 assist, oral care prior to and following intake). Pt continues to demonstrate increased risk for malnutrition and aspiration due to severity of dysphagia (based on results of Modified Barium Swallow). Dysphagia Goals, addressed this date:  1. Pt will functionally tolerate trials of Moderately Thick (honey) Liquids and puree with SLP with no overt clinical s/s of aspiration/penetration (ongoing 12/13/2021)   2. Pt/family will demonstrate understanding of results Modified Barium Swallow Study, risk for aspiration, rationale for diet level and compensatory strategies (ongoing 12/13/2021)   3. Pt will utilize appropriate compensatory strategies as indicated with min with cues (head rotation to the right) (ongoing 12/13/2021)   4. Pt will demonstrate improved sensory motor function via targeted exercises and appropriate treatment modalities (ongoing 12/13/2021)   5. Determine alternative means of nutrition    Diet and Treatment Recommendations 12/13/2021:   1) As PEG tube has been declined at this time recommend initiation of trial of Dysphagia I Pureed with Moderately Thick (honey) Liquids, meds crushed as able with puree, allow ice chips and popsicles in isolation (see below for strategies)  2) Due to severity of dysphagia and risk for aspiration if pt desires rehab instead of hospice would continue to recommend consideration of alternative means of nutrition/hydration in conjunction with extensive dysphagia therapy      Compensatory Strategies: Alternate solids/liquids , Check for pocketing of food L, Check for pocketing of food R, Effortful Swallows , Liquids by spoon only, Upright as possible with all PO intake , Small bites/sips , Remain upright 30-45 min , Total Feed ;Swallow 2-3 times for bite;  Throat clear and re-swallow every 1-2 bites; Oral care following all intake     Speech Language Treatment:  Impressions: 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 Responded to yes/no questions related to self with 100%acc. He was able to follow simple commands with 95%acc. Pt with increased verbal initiation, able to make needs known verbally less than 25% of opportunities due to verbal apraxia. Attempted writing to communicate x1, demonstrates mildly improved legibility. Oriented to person, place, month and year. Demonstrates reduced sustained attention but benefits from cues to maintain attention. Speech Language STG, addressed this date:  1. Pt will improve speech intelligibility in connected speech via graded tasks to 80% (ongoing 12/13/2021)   2. Pt will improve verbal expression for functional expression via graded tasks to 80% (ongoing 12/13/2021)   3. Pt will participate in ongoing cognitive assessment with goals to be established as indicated (ongoing 12/13/2021)     Patient/Family Education:Education given to the Pt and nurse, who verbalized understanding    Assessment: Patient progressing toward goals    Plan: Continue as per plan of care    Discharge Recommendations: Pt will benefit from continued skilled Speech Therapy for Speech and Dysphagia services, prior to returning home. Treatment time  Timed Code Treatment Minutes: 10 minutes   Total Treatment time: 50 minutes     If patient discharges prior to next session this note will serve as a discharge summary.       Signature:  Joel Olivia, [address]  Speech Language Pathologist      -

## 2021-12-14 VITALS
TEMPERATURE: 98 F | OXYGEN SATURATION: 97 % | BODY MASS INDEX: 26.93 KG/M2 | HEART RATE: 64 BPM | SYSTOLIC BLOOD PRESSURE: 132 MMHG | RESPIRATION RATE: 17 BRPM | WEIGHT: 167.55 LBS | HEIGHT: 66 IN | DIASTOLIC BLOOD PRESSURE: 84 MMHG

## 2021-12-14 LAB
ANION GAP SERPL CALCULATED.3IONS-SCNC: 13 MMOL/L (ref 3–16)
BUN BLDV-MCNC: 23 MG/DL (ref 7–20)
CALCIUM SERPL-MCNC: 10.1 MG/DL (ref 8.3–10.6)
CHLORIDE BLD-SCNC: 114 MMOL/L (ref 99–110)
CO2: 20 MMOL/L (ref 21–32)
CREAT SERPL-MCNC: 1.3 MG/DL (ref 0.8–1.3)
GFR AFRICAN AMERICAN: >60
GFR NON-AFRICAN AMERICAN: 53
GLUCOSE BLD-MCNC: 111 MG/DL (ref 70–99)
POTASSIUM SERPL-SCNC: 3.9 MMOL/L (ref 3.5–5.1)
SODIUM BLD-SCNC: 147 MMOL/L (ref 136–145)

## 2021-12-14 PROCEDURE — 6360000002 HC RX W HCPCS: Performed by: INTERNAL MEDICINE

## 2021-12-14 PROCEDURE — 6370000000 HC RX 637 (ALT 250 FOR IP): Performed by: INTERNAL MEDICINE

## 2021-12-14 PROCEDURE — 97530 THERAPEUTIC ACTIVITIES: CPT

## 2021-12-14 PROCEDURE — 92507 TX SP LANG VOICE COMM INDIV: CPT

## 2021-12-14 PROCEDURE — 80048 BASIC METABOLIC PNL TOTAL CA: CPT

## 2021-12-14 PROCEDURE — 97129 THER IVNTJ 1ST 15 MIN: CPT

## 2021-12-14 PROCEDURE — 36415 COLL VENOUS BLD VENIPUNCTURE: CPT

## 2021-12-14 PROCEDURE — 97535 SELF CARE MNGMENT TRAINING: CPT

## 2021-12-14 PROCEDURE — 2500000003 HC RX 250 WO HCPCS: Performed by: INTERNAL MEDICINE

## 2021-12-14 PROCEDURE — 92526 ORAL FUNCTION THERAPY: CPT

## 2021-12-14 RX ADMIN — POTASSIUM CHLORIDE, DEXTROSE MONOHYDRATE AND SODIUM CHLORIDE: 150; 5; 450 INJECTION, SOLUTION INTRAVENOUS at 07:54

## 2021-12-14 RX ADMIN — LISINOPRIL 40 MG: 10 TABLET ORAL at 09:42

## 2021-12-14 RX ADMIN — PANTOPRAZOLE SODIUM 40 MG: 40 TABLET, DELAYED RELEASE ORAL at 09:43

## 2021-12-14 RX ADMIN — KETOTIFEN FUMARATE 1 DROP: 0.35 SOLUTION/ DROPS OPHTHALMIC at 09:34

## 2021-12-14 RX ADMIN — Medication 1000 MCG: at 09:44

## 2021-12-14 RX ADMIN — ASPIRIN 81 MG: 81 TABLET, COATED ORAL at 09:43

## 2021-12-14 RX ADMIN — KETOTIFEN FUMARATE 1 DROP: 0.35 SOLUTION/ DROPS OPHTHALMIC at 01:36

## 2021-12-14 RX ADMIN — CLOPIDOGREL BISULFATE 75 MG: 75 TABLET ORAL at 09:43

## 2021-12-14 RX ADMIN — ENOXAPARIN SODIUM 40 MG: 100 INJECTION SUBCUTANEOUS at 09:33

## 2021-12-14 RX ADMIN — SENNOSIDES 17.2 MG: 8.6 TABLET, FILM COATED ORAL at 09:43

## 2021-12-14 RX ADMIN — AMLODIPINE BESYLATE 10 MG: 5 TABLET ORAL at 09:43

## 2021-12-14 ASSESSMENT — PAIN SCALES - GENERAL
PAINLEVEL_OUTOF10: 0
PAINLEVEL_OUTOF10: 0

## 2021-12-14 NOTE — PROGRESS NOTES
Pt doing well today, in good spirits. Speech continues to improve, tolerating diet as ordered fairly well. Some intermittent coughing. Pt demonstrates placing chin to chest for swallowing, honey thick drinks with teaspoon. Took meds today crushed in pudding without difficulty. no s/s of distress noted, RN at bedside at this time, assisting pt with meal although pt demonstrates feeding self well and as instructed. Intermittent suction provided to pt to ensure no food/fluids are being pocketed, none thus far.

## 2021-12-14 NOTE — PROGRESS NOTES
Occupational Therapy  Facility/Department: [address]  Daily Treatment Note/Re-Assessment  NAME: Estefani Moscoso  : 1945  MRN: [id number]    Date of Service: 2021    Discharge Recommendations: Estefani Moscoso scored a 13/24 on the AM-PAC ADL Inpatient form. Current research shows that an AM-PAC score of 17 or less is typically not associated with a discharge to the patient's home setting. Based on the patient's AM-PAC score and their current ADL deficits, it is recommended that the patient have 3-5 sessions per week of Occupational Therapy at d/c to increase the patient's independence. Please see assessment section for further patient specific details. If patient discharges prior to next session this note will serve as a discharge summary. Please see below for the latest assessment towards goals. OT Equipment Recommendations  Other: defer to next level of care    Assessment   Performance deficits / Impairments: Decreased functional mobility ; Decreased ADL status; Decreased endurance; Decreased strength; Decreased balance; Decreased posture; Decreased coordination  Assessment: Pt is currently functioning below occupational baseline and demo the deficits listed above, pt would benefit from continued skilled OT services to address these deficits and increase IND, safety,and ease with all occupational pursuits. Pt changed mind regarding d/c of OT services to hospice care and would like to continue receiving OT services.   Treatment Diagnosis: Decreased ADL status, functional mobility, and functional transfers d/t Acute CVA (cerebrovascular accident)  Prognosis: Good; Fair  OT Education: OT Role; Plan of Care; Transfer Training  Patient Education: Patient appears to understand directions, reinforce as needed  Barriers to Learning: language  REQUIRES OT FOLLOW UP: Yes  Activity Tolerance  Activity Tolerance: Patient Tolerated treatment well  Safety Devices  Safety Devices in place: Yes  Type of devices: All fall risk precautions in place; Nurse notified; Call light within reach; Chair alarm in place; Left in chair; Patient at risk for falls (RN and PCA present with pt upon exit)  Restraints  Initially in place: No         Patient Diagnosis(es): The primary encounter diagnosis was Aphasia. Diagnoses of Troponin level elevated and Hypokalemia were also pertinent to this visit. has a past medical history of Cerebrovascular disease, Hyperlipidemia, Hypertension, Osteoarthritis, Risk for falls, and Unspecified cerebral artery occlusion with cerebral infarction. has a past surgical history that includes hernia repair and Nose surgery. Restrictions  Restrictions/Precautions  Restrictions/Precautions: Fall Risk (high fall risk, up as tolerated, adult diet - dysphagia pureed, moderately thick/honey liquids)  Required Braces or Orthoses?: No  Position Activity Restriction  Sternal Precautions: Supine BP-- 177/99, EOB after sitting 10 minutes 174/90  Other position/activity restrictions: Per H&P on 12/8 \"76 y.o. male  who presents to the ED complaining of what he says started at 10 PM last night with trouble with speech and communication. He did not get medical attention at the time. This morning around 5 in the morning approximately he tried to get up and go to the bathroom and fell, [facility] care staff apparently got him back into bed but may not have noticed his trouble with speech because EMS was not called until later this morning. Patient does report a history of stroke in the past and is on Plavix. He says he felt fine prior to the 10 PM onset last night. He does not have a headache. He denies any injuries from the fall that occurred earlier this morning, specifically no head or neck injury. Denies any fevers or recent illnesses. He does not feel numb or weak acutely in any of his extremities. Does feel a little uncoordinated but not dizzy.    Head CT shows previous dural disease but nothing acute, specifically no bleed. Not a TPA candidate due to timeline of symptoms although his NIH is a 4 and I have a high clinical suspicion for stroke. \"  Subjective   General  Chart Reviewed: Yes  Patient assessed for rehabilitation services?: Yes  Additional Pertinent Hx: PMH: Cerebrovascular disease (2007), Hyperlipidemia, Hypertension, Osteoarthritis, Risk for falls (06/09/2017), and Unspecified cerebral artery occlusion with cerebral infarction. Response to previous treatment: Patient with no complaints from previous session  Family / Caregiver Present: No  Referring Practitioner: Luciano Gonzalez MD  Diagnosis: Acute CVA (cerebrovascular accident)  Subjective  Subjective: Pt in long sit in bed upon arrival attempting to eat with PCA and coughing; agreeable to cotx and transferring to recliner for improved feeding. Improved verbal communication at this time with short phrases and accurate yes/no responses to questions. Discussed pt changing his mind regarding hospice and would like to proceed with OT services. Full re-eval not needed due to no change in status since d/c form services yesterday. Vital Signs  Patient Currently in Pain: Denies   Orientation  Orientation  Overall Orientation Status: Within Functional Limits  Objective    ADL  Feeding: Minimal assistance; Setup (assist managing containers; pt able to complete scooping and hand-to-mouth pattern with L hand to bring food to mouth with spoon)  UE Dressing: Moderate assistance (gown change)  LE Dressing: Dependent/Total (brief change)  Toileting: Maximum assistance (pericare in supine following urination in brief with pt assisting to wipe corina area with wipe)        Balance  Sitting Balance:  Moderate assistance (initial max A with mod A after positioning pt with feet on floor, BUE support on ANDREI STEDY, and educating on maintaining midline; continued R lean)  Standing Balance: Maximum assistance  Standing Balance  Time: ~30 sec x2  Activity: transfers  Comment: with BUE support using grab bar of ANDREI STEDY  Functional Mobility  Functional Mobility Comments: pt does not ambulate at baseline, completes pivots with assist x2 at baseline  Bed mobility  Rolling to Left: Maximum assistance  Rolling to Right: Maximum assistance  Supine to Sit: Maximum assistance (with elevated head of bed, increased time to perform)  Scooting: Maximal assistance  Comment: seated at edge of bed x 10 minutes - progressing from max (A) with significant right lean to mod A static sitting with (B) UE support on Andrei Stedy bar  Transfers  Sit to stand: 2 Person assistance; Dependent/Total  Stand to sit: Dependent/Total; 2 Person assistance  Transfer Comments: mod A x2 for STS EOB>ANDREI STEDY>recliner with pt use of BUE to pull into stance on grab bar                       Cognition  Cognition Comment: Difficult to formally assess due to continued limitations in fluent speech, although there were improvements today with pt speaking in short phrases. Receptive language appeared [facility] throughout as demonstrated with pt following 1-step commands consistently.      Perception  Overall Perceptual Status: Impaired  Unilateral Attention: Cues to maintain midline in sitting (R lateral lean)  Initiation: Cues to initiate tasks                 LUE AROM (degrees)  LUE AROM : WFL  Left Hand AROM (degrees)  Left Hand AROM: WFL  RUE AROM (degrees)  RUE AROM : WFL  RUE General AROM: Right UE moves slower than left  Right Hand AROM (degrees)  Right Hand AROM: WFL                 Plan   Plan  Times per week: 5-7x/wk  Times per day: Daily  Current Treatment Recommendations: Strengthening, Balance Training, Functional Mobility Training, Endurance Training, Equipment Evaluation, Education, & procurement, Patient/Caregiver Education & Training, Self-Care / ADL, Safety Education & Training, Neuromuscular Re-education  G-Code     OutComes Score AM-PAC Score        AM-PAC Inpatient Daily Activity Raw Score: 13 (12/14/21 1339)  AM-PAC Inpatient ADL T-Scale Score : 32.03 (12/14/21 1339)  ADL Inpatient CMS 0-100% Score: 63.03 (12/14/21 1339)  ADL Inpatient CMS G-Code Modifier : CL (12/14/21 1339)    Goals  Short term goals  Time Frame for Short term goals: d/c  Short term goal 1: Pt will complete functional transfer to ADL surface with max(A)x2--- ANDREI STEDY mod of 2 bed to chair 12/10, 12/14  Short term goal 2: Pt will improve seated balance to CGA while completing ADL task-- not met, fluctuated from min to max sitting EOB 12/10, max>mod A 12/14  Short term goal 3: Pt will complete bed mobility with min(A)-- not met, mod supine to sit 12/10/21, max A 12/14  Short term goal 4: pt will complete UB ADLs with setup-- not met, max assist 12/10/21, mod A 12/14  Long term goals  Time Frame for Long term goals : LTG=STG  Patient Goals   Patient goals : pt did not state       Therapy Time   Individual Concurrent Group Co-treatment   Time In       1227   Time Out       1300   Minutes       33   Timed Code Treatment Minutes: Azra Flood 126

## 2021-12-14 NOTE — PROGRESS NOTES
Physical Therapy  Facility/Department: [address]  Daily Treatment Note  NAME: Maxi Zapata  : 1945  MRN: [id number]    Date of Service: 2021    Discharge Recommendations:  Maxi Zapata scored a 6/24 on the AM-PAC short mobility form. Current research shows that an AM-PAC score of 17 or less is typically not associated with a discharge to the patient's home setting. Based on the patient's AM-PAC score and their current functional mobility deficits, it is recommended that the patient have 3-5 sessions per week of Physical Therapy at d/c to increase the patient's independence. Please see assessment section for further patient specific details. If patient discharges prior to next session this note will serve as a discharge summary. Please see below for the latest assessment towards goals. 24 hour supervision or assist, 3-5 sessions per week   PT Equipment Recommendations  Other: plan to continue to assess and likely defer recommendations to next level of care    Assessment   Body structures, Functions, Activity limitations: Decreased functional mobility ; Decreased strength; Decreased endurance; Decreased posture; Decreased ROM; Decreased balance; Decreased fine motor control; Decreased coordination; Decreased ADL status  Assessment: Patient demonstrates increased right UE/LE tone, right sided weakness, aphasia, dysarthria, poor functional balance with history of CVA exacerbated by recent CVA. Patient follows directions, responds appropriately with gestures, head nods, and attempts to vocalize. Patient typically pivot transfers to w/c at baseline, non-ambulatory since initial CVA. Patient will continue to benefit from additional skilled PT intervention to facilitate safe mobility and to optimize (I) to promote return to prior level of function.   Treatment Diagnosis: impaired functional mobility  Prognosis: Good  Patient Education: Patient educated on role of PT, use of call light, and PT recommendations - nods to demonstrate understanding. Barriers to Learning: Expressive aphasia  REQUIRES PT FOLLOW UP: Yes  Activity Tolerance  Activity Tolerance: Patient limited by endurance; Patient Tolerated treatment well     Patient Diagnosis(es): The primary encounter diagnosis was Aphasia. Diagnoses of Troponin level elevated and Hypokalemia were also pertinent to this visit. has a past medical history of Cerebrovascular disease, Hyperlipidemia, Hypertension, Osteoarthritis, Risk for falls, and Unspecified cerebral artery occlusion with cerebral infarction. has a past surgical history that includes hernia repair and Nose surgery. Restrictions  Restrictions/Precautions  Restrictions/Precautions: Fall Risk (high fall risk, up as tolerated, adult diet - dysphagia pureed, moderately thick/honey liquids)  Required Braces or Orthoses?: No  Position Activity Restriction  Sternal Precautions: Supine BP-- 177/99, EOB after sitting 10 minutes 174/90  Other position/activity restrictions: Per H&P on 12/8 \"76 y.o. male  who presents to the ED complaining of what he says started at 10 PM last night with trouble with speech and communication. He did not get medical attention at the time. This morning around 5 in the morning approximately he tried to get up and go to the bathroom and fell, [facility] care staff apparently got him back into bed but may not have noticed his trouble with speech because EMS was not called until later this morning. Patient does report a history of stroke in the past and is on Plavix. He says he felt fine prior to the 10 PM onset last night. He does not have a headache. He denies any injuries from the fall that occurred earlier this morning, specifically no head or neck injury. Denies any fevers or recent illnesses. He does not feel numb or weak acutely in any of his extremities. Does feel a little uncoordinated but not dizzy.    Head CT shows previous dural disease but nothing acute, specifically no bleed. Not a TPA candidate due to timeline of symptoms although his NIH is a 4 and I have a high clinical suspicion for stroke. \"  Subjective   General  Chart Reviewed: Yes  Family / Caregiver Present: No  Subjective  Subjective: Patient nodding yes/no appropriately and consistently, follows one step commands, vocalizing intermittently \"so I don't fall over\" - \"it was my father's name\" - dysarthric but able to decipher his attempts to speak. General Comment  Comments: Patient semifowlers in bed upon arrival - agreeable to PT. Patient attempting to eat with PCA although coughing noted, transferred to recliner to improve posture; coughed 1-2x once in chair and initiating eating - RN notified, PCA present at end of session to (S) completion of meal.          Orientation  Orientation  Overall Orientation Status: Within Functional Limits (nods appropriately to respond to yes/no questions)    Objective   Bed mobility  Rolling to Left: Maximum assistance  Rolling to Right: Maximum assistance  Supine to Sit: Maximum assistance (with elevated head of bed, increased time to perform)  Scooting: Maximal assistance  Comment: seated at edge of bed x 10 minutes - progressing from max (A) with significant right lean to mod A static sitting with (B) UE support on Candy Stedy bar  Transfers  Sit to Stand:  Moderate Assistance; 2 Person Assistance (to Abimbola Pino)  Stand to sit: Moderate Assistance; 2 Person Assistance (from Abimbola Pino)  Bed to Chair: Dependent/Total; 2 Person Assistance (with Abimbola Pino - (A) to maintain midline while sitting and transferring on Abimbola Pino, leans to right - attempts to correct to midline with cues)        Balance  Posture: Poor (forward flexed, rounded shoulders, leans to right)  Sitting - Static: Poor  Sitting - Dynamic: Poor  Standing - Static: Poor  Standing - Dynamic: Poor        AM-PAC Score  AM-PAC Inpatient Mobility Raw Score : 6 (12/14/21 1310)  AM-PAC Inpatient T-Scale Score : 23.55 (12/14/21 1310)  Mobility Inpatient CMS 0-100% Score: 100 (12/14/21 1310)  Mobility Inpatient CMS G-Code Modifier : CN (12/14/21 1310)          Goals  Short term goals  Time Frame for Short term goals: Before discharge - all goals ongoing 12/14  Short term goal 1: Pt will roll left/right with Min A.  (Not met)  Short term goal 2: Pt will complete supine<>sit with Min A.  (Not met)  Short term goal 3: Pt will sit EOB x10 minutes with no more than Min A.  (Not met)  Short term goal 4: Pt will tolerate transfer bed<>chair with Max A x2. (Not met)  Patient Goals   Patient goals : nods \"yes\" to working on getting OOB and getting back to his baseline mobility    Plan    Plan  Times per week: 5-7  Current Treatment Recommendations: Strengthening, Functional Mobility Training, Transfer Training, Balance Training, ROM, Neuromuscular Re-education, Safety Education & Training, Patient/Caregiver Education & Training, Equipment Evaluation, Education, & procurement, Wheelchair Mobility Training, Positioning, Endurance Training, Home Exercise Program  Safety Devices  Type of devices: Call light within reach, Chair alarm in place, Gait belt, Left in chair, Nurse notified  Restraints  Initially in place: No     Therapy Time   Individual Concurrent Group Co-treatment   Time In 1227         Time Out 1300         Minutes 33                    Timed Code Treatment Minutes: 33 minutes    Total Treatment Minutes: 33 Minutes    If patient discharges prior to next treatment, this note will serve as discharge summary.     Derrick Camarillo PT, DPT #[id number]

## 2021-12-14 NOTE — PROGRESS NOTES
PALLIATIVE MEDICINE PROGRESS NOTE     Patient name:Deepak Johnson    U:[id number] JTL:[id number]  Room/Bed:[facility]3384/3384-01    LOS: 6 days        ASSESSMENT/RECOMMENDATIONS     67 y.o. male with Dysphagia and Aphasia s/p CVA        Symptom Management:  1. Dysphagia- pt failed swallow eval, pt and sister both agree no PEG tube their mother had PEG tube and sister states that she suffered for 7 years before she finally . 2. Aphasia- pt able to write and nod his head is has communicated to multiple providers that he doesn't want aggressive measures   3. Goals of Care- Pt plan to transfer to hospice today. Will reach out to [facility]. Pt is DNRCC at baseline.      Patient/Family Goals of Care :    talked to pts sister who is his POA she states that she and pt discussed and he spelled it out for her that he wants no feeding tube and wants to go to Hospice on [address] in [place] if possible. Will reach out to [facility]. Pt is DNRCC at baseline.   Pt plan to transfer to hospice today. Will reach out to [facility]. Pt is [address] at baseline.           Disposition/Discharge Plan:   Hospice consult     Advance Directives:  · Surrogate Decision Maker: Mignon-sister  · Code status:  DNR-CC     Case discussed with: patient, floor RN, Dr Caceres Pac  Thank you for allowing us to participate in the care of this patient. SUBJECTIVE     Chief Complaint: Dysphagia    Last 24 hours:   Pt alert today and communicating via writing he is on board with no PEG tube and Hospice care    ROS:  Review of Systems -   History obtained from chart review and unobtainable from patient due to language barrier      Patient unable to complete full ROS due to current cognitive status. Information that is obtained from nursing and chart. OBJECTIVE   /83   Pulse 79   Temp 98 °F (36.7 °C) (Oral)   Resp 17   Ht 5' 6\" (1.676 m)   Wt 167 lb 8.8 oz (76 kg)   SpO2 94%   BMI 27.04 kg/m²   I/O last 3 completed shifts:   In: 1424.9 [P.O.:120; I.V.:1304.9]  Out: -   No intake/output data recorded.       Physical Examination:   General appearance - chronically ill appearing  Mental status - aphasia  Neck - supple, no significant adenopathy  Chest - clear to auscultation, no wheezes, rales or rhonchi, symmetric air entry  Abdomen - soft, nontender, nondistended, no masses or organomegaly  Musculoskeletal - no joint tenderness, deformity or swelling  Skin - normal coloration and turgor, no rashes, no suspicious skin lesions noted         Total time: 35 minutes  >50% of time spent counseling patient at bedside or POA/family member if applicable , reviewing information and discussing care, coordinating with care team       Signed By: Electronically signed by Griselda Hazel, APRN - CNP on 12/14/2021 at 12:25 PM   Palliative Medicine   [phone]    December 14, 2021

## 2021-12-14 NOTE — FLOWSHEET NOTE
Data- discharge order received, pt  (appointed legal authority) verbalized agreement to discharge, disposition to [facility] ([place], [place])  SARI reviewed and signed by physician. Action- AVS prepared, SARI completed/ reported faxed by case management/. Discharge instruction summary: Diet- pureed with honey liquids, Activity- max 2 person assist, immunizations reviewed and up to date, medications prescriptions to be filled at receiving facility except for the controlled prescriptions to be sent: n/a, Transfer code status: DNR-CC, LDAs to remain with discharge: none. DME used: none. Response- Bedside RN tried to call report to receiving facility 5 times with no answer. Pt belongings gathered, peripheral IV and cardiac monitoring removed. Disposition to Discharged via cart/stretcher to skilled nursing by EMS transportation, no complications reported. 1. WEIGHT: Admit Weight: 178 lb (80.7 kg) (12/08/21 1214)        Today  Weight: 167 lb 8.8 oz (76 kg) (12/08/21 2132)       2.  O2 SAT.: SpO2: 97 % (12/14/21 1724)

## 2021-12-14 NOTE — PLAN OF CARE
Problem: Falls - Risk of:  Goal: Absence of physical injury  Description: Absence of physical injury  Outcome: Ongoing     Problem: Skin Integrity:  Goal: Will show no infection signs and symptoms  Description: Will show no infection signs and symptoms  Outcome: Ongoing  Note: No active infections, changed drsg to L shin abrasion, noted granulation tissue.     Goal: Absence of new skin breakdown  Description: Absence of new skin breakdown  Outcome: Ongoing     Problem: HEMODYNAMIC STATUS  Goal: Patient has stable vital signs and fluid balance  Outcome: Ongoing     Problem: ACTIVITY INTOLERANCE/IMPAIRED MOBILITY  Goal: Mobility/activity is maintained at optimum level for patient  Outcome: Ongoing

## 2021-12-14 NOTE — PROGRESS NOTES
Progress Note - Dr. Lian Manning - Internal Medicine  PCP: OLGA Torres - CNP [facility] [place] [address] [facility] 375 [id number]    Hospital Day: 6  Code Status: DNR-CC  Current Diet: ADULT DIET; Dysphagia - Pureed; Moderately Thick (Honey)        CC: follow up on medical issues    Subjective:   Sulema Sanchez is a 67 y.o. male. Pt seen and examined  Chart reviewed since last visit, labs and imaging below        Events of weekend noted  Pt decided against PEG yesterday  I confirmed this decision with him yesterday morning  He wanted hospice eval  Since then, he talked with Neuro who asked pt to think about it    Continue to await transfer back to SNF with hospice per family wishes    He denies chest pain, denies shortness of breath, denies nausea,  denies emesis. 10 system Review of Systems is reviewed with patient, and pertinent positives are noted in HPI above . Otherwise, Review of systems is negative. I have reviewed the patient's medical and social history in detail and updated the computerized patient record. To recap: He  has a past medical history of Cerebrovascular disease, Hyperlipidemia, Hypertension, Osteoarthritis, Risk for falls, and Unspecified cerebral artery occlusion with cerebral infarction. . He  has a past surgical history that includes hernia repair and Nose surgery. Maura Baig He  reports that he has never smoked. He has never used smokeless tobacco. He reports that he does not drink alcohol and does not use drugs. .        Active Hospital Problems    Diagnosis Date Noted    Aphasia [R47.01]     Acute CVA (cerebrovascular accident) ([facility] [address] [I63.9] 12/08/2021    CAD in native artery [I25.10]     Hemiparesis due to old lacunar stroke ([facility] [address] [I69.359]     Gastroesophageal reflux disease without esophagitis [K21.9] 07/12/2016    Dyslipidemia [E78.5] 12/16/2014    HTN (hypertension), benign [I10] 11/08/2012       Current Facility-Administered Medications: hydrALAZINE (APRESOLINE) injection 20 mg, 20 mg, IntraVENous, Q6H PRN  ketotifen (ZADITOR) 0.025 % ophthalmic solution 1 drop, 1 drop, Both Eyes, BID PRN  ketorolac (TORADOL) injection 15 mg, 15 mg, IntraVENous, Q6H PRN  dextrose 5 % and 0.45 % NaCl with KCl 20 mEq infusion, , IntraVENous, Continuous  perflutren lipid microspheres (DEFINITY) injection 1.65 mg, 1.5 mL, IntraVENous, ONCE PRN  iopamidol (ISOVUE-370) 76 % injection 75 mL, 75 mL, IntraVENous, ONCE PRN  amLODIPine (NORVASC) tablet 10 mg, 10 mg, Oral, Daily  vitamin B-12 (CYANOCOBALAMIN) tablet 1,000 mcg, 1,000 mcg, Oral, Daily  senna (SENOKOT) tablet 17.2 mg, 2 tablet, Oral, Daily  lisinopril (PRINIVIL;ZESTRIL) tablet 40 mg, 40 mg, Oral, Daily  clopidogrel (PLAVIX) tablet 75 mg, 75 mg, Oral, Daily  pantoprazole (PROTONIX) tablet 40 mg, 40 mg, Oral, Daily  atorvastatin (LIPITOR) tablet 20 mg, 20 mg, Oral, Nightly  sodium chloride flush 0.9 % injection 10 mL, 10 mL, IntraVENous, 2 times per day  sodium chloride flush 0.9 % injection 10 mL, 10 mL, IntraVENous, PRN  0.9 % sodium chloride infusion, 25 mL, IntraVENous, PRN  ondansetron (ZOFRAN-ODT) disintegrating tablet 4 mg, 4 mg, Oral, Q8H PRN **OR** ondansetron (ZOFRAN) injection 4 mg, 4 mg, IntraVENous, Q6H PRN  magnesium hydroxide (MILK OF MAGNESIA) 400 MG/5ML suspension 30 mL, 30 mL, Oral, Daily PRN  enoxaparin (LOVENOX) injection 40 mg, 40 mg, SubCUTAneous, Daily  aspirin EC tablet 81 mg, 81 mg, Oral, Daily **OR** aspirin suppository 300 mg, 300 mg, Rectal, Daily  labetalol (NORMODYNE;TRANDATE) injection 10 mg, 10 mg, IntraVENous, Q10 Min PRN  0.9 % sodium chloride bolus, 500 mL, IntraVENous, PRN  potassium chloride (KLOR-CON M) extended release tablet 40 mEq, 40 mEq, Oral, PRN **OR** potassium bicarb-citric acid (EFFER-K) effervescent tablet 40 mEq, 40 mEq, Oral, PRN **OR** potassium chloride 10 mEq/100 mL IVPB (Peripheral Line), 10 mEq, IntraVENous, PRN         Objective:  BP (!) 162/91   Pulse 67   Temp 99.4 °F (37.4 °C) (Axillary) Resp 18   Ht 5' 6\" (1.676 m)   Wt 167 lb 8.8 oz (76 kg)   SpO2 95%   BMI 27.04 kg/m²      Patient Vitals for the past 24 hrs:   BP Temp Temp src Pulse Resp SpO2   12/14/21 0738 (!) 162/91 99.4 °F (37.4 °C) Axillary 67 18 95 %   12/14/21 0500 124/67 98.2 °F (36.8 °C) Axillary 67 16 96 %   12/13/21 2316 (!) 141/100 98.2 °F (36.8 °C) Axillary 70 18 96 %   12/13/21 2100 (!) 170/97 98.2 °F (36.8 °C) Axillary 68 18 96 %   12/13/21 1740 (!) 153/88 98.9 °F (37.2 °C) Axillary 68 17 96 %   12/13/21 1255 (!) 150/84        12/13/21 1145 (!) 174/98 98 °F (36.7 °C) Axillary 63 16 95 %     No data found.         Intake/Output Summary (Last 24 hours) at 12/14/2021 0825  Last data filed at 12/13/2021 1918  Gross per 24 hour   Intake 1424.91 ml   Output    Net 1424.91 ml         Physical Exam:   Vitals as above  General appearance: alert, appears stated age and cooperative    Head: Normocephalic, without obvious abnormality, atraumatic    Lungs: clear to auscultation bilaterally    Heart: regular rate and rhythm, S1, S2 normal, no murmur    Abdomen: soft, non-tender; bowel sounds normal; no masses, no organomegaly    Extremities: extremities normal, atraumatic, no cyanosis, no edema      Labs:  Lab Results   Component Value Date    WBC 4.3 12/13/2021    HGB 16.5 12/13/2021    HCT 48.3 12/13/2021     (L) 12/13/2021    CHOL 123 12/09/2021    TRIG 79 12/09/2021    HDL 37 (L) 12/09/2021    ALT 12 12/09/2021    AST 19 12/09/2021     (H) 12/14/2021    K 3.9 12/14/2021     (H) 12/14/2021    CREATININE 1.3 12/14/2021    BUN 23 (H) 12/14/2021    CO2 20 (L) 12/14/2021    TSH 3.98 02/26/2015    PSA 1.68 01/29/2016    INR 0.96 12/08/2021    LABA1C 5.7 12/09/2021    LABMICR Not Indicated 01/11/2021     Lab Results   Component Value Date    CKTOTAL 435 (H) 12/08/2021    TROPONINI 0.03 (H) 12/08/2021       Recent Imaging Results are Reviewed:  Echo Complete    Result Date: 12/11/2021  Transthoracic Echocardiography Report (TTE)  Demographics   Patient Name       Aprana CARRANZA   Date of Study      12/11/2021         Gender              Male   Patient Number     [id number]         Date of Birth       1945   Visit Number       [id number]          Age                 67 year(s)   Accession Number   [id number]         Room Number         2827   Corporate ID       [id number]           Sonographer         Juni Smith RVT, BS   Ordering Physician Jose Pisano,  Interpreting        nAgel Norton MD MD                 Physician  Procedure Type of Study   TTE procedure:ECHOCARDIOGRAM COMPLETE 2D W DOPPLER W COLOR. Procedure Date Date: 12/11/2021 Start: 01:37 PM Study Location: [facility] - Echo Lab Technical Quality: Adequate visualization Indications:CVA. Patient Status: Routine Contrast Medium: Bubble Study. Amount - 10 ml Height: 66 inches Weight: 178 pounds BSA: 1.9 m2 BMI: 28.73 kg/m2 HR: 65 bpm BP: 161/71 mmHg  Conclusions   Summary  Normal left ventricle size, wall thickness, and systolic function with an  estimated ejection fraction of 55-60%. No regional wall motion abnormalities  are seen. Global longitudinal strain: -21% (normal). Normal diastolic filling pattern for age. The mitral valve leaflets are slightly thickened with normal leaflet  mobility. Mild aortic stenosis with a peak velocity of 2.55 m/s and a mean pressure  gradient of 15 mmHg. A bubble study was performed and fails to show evidence of shunting. Signature   ------------------------------------------------------------------  Electronically signed by Angel Norton MD (Interpreting  physician) on 12/11/2021 at 03:08 PM  ------------------------------------------------------------------   Findings   Left Ventricle  Normal left ventricle size, wall thickness, and systolic function with an  estimated ejection fraction of 55-60%.  No regional wall motion abnormalities  are seen. Average E/e': 8.2. Global longitudinal strain: -21% (normal). Normal diastolic filling pattern for age. Mitral Valve  The mitral valve leaflets are slightly thickened with normal leaflet  mobility. No evidence of mitral regurgitation or stenosis. Mild mitral annular calcification. Left Atrium  The left atrium is normal in size. Aortic Valve  No evidence of aortic valve regurgitation. Mild aortic stenosis with a peak velocity of 2.55 m/s and a mean pressure  gradient of 15 mmHg. Tricuspid aortic valve. Aorta  The aortic root is normal in size. Right Ventricle  The right ventricle is normal in size and function. TAPSE: 2.84 cm. RV s' velocity: 24.6 cm/s. Tricuspid Valve  The tricuspid valve is normal in structure and function. There is no  significant tricuspid valve regurgitation or stenosis. Right Atrium  The right atrial size is normal.   Pulmonic Valve  The pulmonic valve is not well visualized. There is no evidence of pulmonic valve regurgitation or stenosis. Pericardial Effusion  No pericardial effusion noted. Pleural Effusion  No pleural effusion. Miscellaneous  The inferior vena cava appears normal in size with normal respiratory  variation. A bubble study was performed and fails to show evidence of shunting. Unable to estimate pulmonary artery pressure secondary to incomplete TR jet  envelope.   M-Mode/2D Measurements (cm)   LV Diastolic Dimension: 4.54 cm LV Systolic Dimension: 8.81 cm  LV Septum Diastolic: 1.90 cm  LV PW Diastolic: 1 cm           AO Root Dimension: 3.6 cm                                  LA Dimension: 4.1 cm                                  LA Area: 21.4 cm2  LVOT: 2 cm                      LA volume/Index: 57.03 ml /30 ml/m2  Doppler Measurements   AV Peak Velocity: 255 cm/s     MV Peak E-Wave: 59.6 cm/s  AV Peak Gradient: 26.01 mmHg   MV Peak A-Wave: 84.4 cm/s  AV Mean Gradient: 15 mmHg      MV E/A Ratio: 0.71  LVOT Peak Velocity: 125 cm/s  AV Area (Continuity):1.47 cm2                                  MV Deceleration Time: 311 msec  E' Septal Velocity: 5.87 cm/s  E' Lateral Velocity: 9.57 cm/s  PV Peak Velocity: 113 cm/s  PV Peak Gradient: 5.11 mmHg   Aortic Valve   Peak Velocity: 255 cm/s     Mean Velocity: 180 cm/s  Peak Gradient: 26.01 mmHg   Mean Gradient: 15 mmHg  Area (continuity): 1.47 cm2  AV VTI: 50.3 cm  Aorta   Aortic Root: 3.6 cm  LVOT Diameter: 2 cm      CT HEAD WO CONTRAST    Result Date: 12/8/2021  EXAMINATION: CT OF THE HEAD WITHOUT CONTRAST  12/8/2021 11:48 am TECHNIQUE: CT of the head was performed without the administration of intravenous contrast. Dose modulation, iterative reconstruction, and/or weight based adjustment of the mA/kV was utilized to reduce the radiation dose to as low as reasonably achievable. COMPARISON: Head CT 07/08/2021 HISTORY: ORDERING SYSTEM PROVIDED HISTORY: speech trouble TECHNOLOGIST PROVIDED HISTORY: Reason for exam:-> speech trouble Has a \"code stroke\" or \"stroke alert\" been called?-> yes Decision Support Exception - unselect if not a suspected or confirmed emergency medical condition->Emergency Medical Condition (MA) FINDINGS: BRAIN/VENTRICLES: There is no acute intracranial hemorrhage, mass effect or midline shift. No abnormal extra-axial fluid collection. The gray-white differentiation is maintained without evidence of an acute infarct. There is prominence of the ventricles and sulci due to global parenchymal volume loss. There are nonspecific areas of hypoattenuation within the periventricular and subcortical white matter, which likely represent chronic microvascular ischemic change. Remote lacunar stroke bilateral thalamus and left caudate lobe. ORBITS: The visualized portion of the orbits demonstrate no acute abnormality. SINUSES: The visualized paranasal sinuses and mastoid air cells demonstrate no acute abnormality.  SOFT TISSUES/SKULL: No acute abnormality of the visualized skull or soft tissues. 1. No acute intracranial abnormality. 2. Remote lacunar stroke bilateral thalamus and left caudate lobe. 3. Senescent changes. 4.  White matter hypoattenuation described is typical of microvascular ischemic disease or as sequela of dysmyelinating/demyelinating processes. Per stroke alert protocol, the at Dr. Rupa Lora radiology [facility] results were called by Dr. Chelsie Wilhelm to [facility] on 12/8/2021 at 12:13. CT CERVICAL SPINE WO CONTRAST    Result Date: 12/8/2021  EXAMINATION: CT OF THE CERVICAL SPINE WITHOUT CONTRAST 12/8/2021 11:48 am TECHNIQUE: CT of the cervical spine was performed without the administration of intravenous contrast. Multiplanar reformatted images are provided for review. Dose modulation, iterative reconstruction, and/or weight based adjustment of the mA/kV was utilized to reduce the radiation dose to as low as reasonably achievable. COMPARISON: 07/08/2021 HISTORY: ORDERING SYSTEM PROVIDED HISTORY: stroke alert, fall TECHNOLOGIST PROVIDED HISTORY: Reason for exam:->stroke alert, fall Decision Support Exception - unselect if not a suspected or confirmed emergency medical condition->Emergency Medical Condition (MA) FINDINGS: BONES/ALIGNMENT: There is no acute fracture or traumatic malalignment. DEGENERATIVE CHANGES: Multilevel degenerative changes. SOFT TISSUES: There is no prevertebral soft tissue swelling. No acute abnormality of the cervical spine. XR CHEST PORTABLE    Result Date: 12/8/2021  EXAMINATION: ONE XRAY VIEW OF THE CHEST 12/8/2021 12:25 pm COMPARISON: Chest radiograph 4-7-2019 HISTORY: ORDERING SYSTEM PROVIDED HISTORY: dizziness TECHNOLOGIST PROVIDED HISTORY: Reason for exam:->dizziness FINDINGS: Lung volumes are low. No pulmonary consolidation, effusion or pneumothorax. There is borderline cardiac enlargement. Mild tortuosity of the thoracic aorta. 1. Borderline cardiomegaly. 2.   No evidence of pulmonary edema.      CTA HEAD NECK W CONTRAST    Result Date: 12/8/2021  EXAMINATION: CTA OF THE HEAD AND NECK WITH CONTRAST 12/8/2021 11:48 am: TECHNIQUE: CTA of the head and neck was performed with the administration of intravenous contrast. Multiplanar reformatted images are provided for review. MIP images are provided for review. Stenosis of the internal carotid arteries measured using NASCET criteria. Dose modulation, iterative reconstruction, and/or weight based adjustment of the mA/kV was utilized to reduce the radiation dose to as low as reasonably achievable. COMPARISON: Noncontrast CT brain 12/08/2021 HISTORY: ORDERING SYSTEM PROVIDED HISTORY: stroke like sx TECHNOLOGIST PROVIDED HISTORY: Reason for exam:->stroke like sx Decision Support Exception - unselect if not a suspected or confirmed emergency medical condition->Emergency Medical Condition (MA) FINDINGS: CTA NECK: AORTIC ARCH/ARCH VESSELS: Mild calcified atherosclerotic plaque is present within the aortic arch. The origins of the great vessels are normal.  There is no significant stenosis of the innominate or subclavian arteries. CAROTID ARTERIES: The common carotid arteries are normal in appearance. There is no significant stenosis or dissection. There is calcified and noncalcified atherosclerotic plaque at the origins of the internal carotid arteries. There is a 50% stenosis within the proximal right internal carotid artery based on NASCET criteria. There is noncalcified atherosclerotic plaque extending into the lumen of the right internal carotid artery. There is no significant stenosis of the left internal carotid artery evident based on NASCET criteria. VERTEBRAL ARTERIES: There is a mild stenosis at the origin of the right vertebral artery. The vertebral arteries are otherwise unremarkable. SOFT TISSUES: In the lung apices are clear. There is no pathologically enlarged lymphadenopathy identified. There is no soft tissue mass evident.  The parotid glands and submandibular glands are normal. BONES: No lytic or blastic osseous lesions are identified. 3D surface reformations were performed and concur with the above findings. CTA HEAD: ANTERIOR CIRCULATION: Atherosclerotic calcifications are present within the cavernous segments of the internal carotid arteries. There is no significant stenosis or aneurysm evident. The anterior and middle cerebral arteries are normal.  No significant stenosis or aneurysm is identified. POSTERIOR CIRCULATION: The distal vertebral arteries are normal.  The basilar artery is normal.  There is no significant stenosis or aneurysm identified. There is a fetal origin of the right posterior cerebral artery, an anatomic variant. The left posterior cerebral artery is normal. OTHER: No dural venous sinus thrombosis on this non-dedicated study. BRAIN: There is no mass effect or midline shift. There is no vascular malformation identified. 3D surface reformations were performed and concur with the above findings. This scan was analyzed using [employer]. [employer] contact LVO. Identification of suspected findings is not for diagnostic use beyond notification. [employer] LVO is limited to analysis of imaging data and should not be used in-lieu of full patient evaluation or relied upon to make or confirm diagnosis. 1. No large vessel occlusion, significant stenosis or cerebral aneurysm identified. 2. Calcified and noncalcified atherosclerotic plaque at the origin of the right internal carotid artery resulting in a 50% stenosis based on NASCET criteria. There is noncalcified atherosclerotic plaque extending into the lumen of the right internal carotid artery which could predispose to distal emboli. 3. Mild stenosis at the origin of the right vertebral artery.      MRI brain without contrast    Result Date: 12/8/2021  EXAMINATION: MRI OF THE BRAIN WITHOUT CONTRAST  12/8/2021 7:45 pm TECHNIQUE: Multiplanar multisequence MRI of the brain was performed without the PROVIDED HISTORY: swallowing TECHNOLOGIST PROVIDED HISTORY: Reason for exam:->swallowing Reason for Exam: Evaluate swallowing Stroke patient, difficulty swallowing. FINDINGS: With the patient in the seated right lateral position, the patient swallowed multiple consistency foods in succession while under video fluoroscopic surveillance. The patient swallowed thin liquids, nectar thickened liquids, and honey thickened liquids from a teaspoon and cup. The patient also swallowed purees. There was a poor oral motor phase of swallowing throughout the examination. There was premature spillage of contrast into the valleculae and piriform sinuses throughout the exam with notable residuals throughout the study as well. There was positive silent tracheal aspiration of thin liquids. There was mild-to-moderate laryngeal penetration without tracheal aspiration of nectar thickened liquids. There was no evidence of laryngeal penetration or tracheal aspiration of honey thickened liquids and purees. With head turn to the right, there appeared to be improved clearing of residuals within the valleculae and piriform sinuses. 1. Positive silent tracheal aspiration of thin liquids. 2. Mild-to-moderate laryngeal penetration without tracheal aspiration of nectar thickened liquids. 3. No evidence of laryngeal penetration or tracheal aspiration of honey thickened liquids and purees. Please see separate speech pathology report for full discussion of findings and recommendations. Lab Results   Component Value Date    GLUCOSE 111 12/14/2021    GLUCOSE 102 05/09/2017     Lab Results   Component Value Date    POCGLU 108 12/12/2021     BP (!) 162/91   Pulse 67   Temp 99.4 °F (37.4 °C) (Axillary)   Resp 18   Ht 5' 6\" (1.676 m)   Wt 167 lb 8.8 oz (76 kg)   SpO2 95%   BMI 27.04 kg/m²     Assessment and Plan:  Principal Problem:    Acute CVA (cerebrovascular accident) ([facility] [address] -Established problem. Stable.   Still with

## 2021-12-14 NOTE — PROGRESS NOTES
Fed pt evening meal today, did fair with small bites of pudding and honey thick apple juice. Pt consumed 100% of bothe and 10% of meal.  Assisted with intermittent suctioning, pt noted swallowing multiple times. Did have some intermittent coughing, reminded pt to tuck chin to chest to swallow, and allow for suctioning and bites that become problematic.  Pt stated he enjoyed his meal.

## 2021-12-14 NOTE — PROGRESS NOTES
Assessment complete. VSS no SOB or any distress noted. Call light within reach, pt encouraged to call if any needs arise. No further requests at this time. Will continue to monitor. Sips of thickened liquid tolerated well.    Pt incontinent - cleaned changed and repositioned  Mouth suctioned

## 2021-12-14 NOTE — PROGRESS NOTES
Speech  Language And Dysphagia Treatment Note    Name: Gabino Pak  : 1945  Medical Diagnosis: Aphasia [R47.01]  Hypokalemia [E87.6]  Troponin level elevated [R77.8]  Acute CVA (cerebrovascular accident) ([facility] [address] [I63.9]  Treatment Diagnosis: Oropharyngeal Dysphagia, Apraxia, Expressive Aphasia   Pain: 0/10 reported by Pt    Subjective:  Pt awake and alert. RN reported improvement in self feeding and with verbal communication. Throughout session pt indicated that he would like to try to work with therapy for rehab. Modified Barium Swallow Study:  Modified Barium Swallow evaluation completed on 2021. Results indicate moderate/severe oropharyngeal dysphagia. Pt demonstrated deep laryngeal penetration and silent aspiration of thin liquids and deep laryngeal penetration of Mildly Thick (Nectar) Liquids that was not self clearing. Cued cough was weak and not effective. Although pt did not demonstrate aspiration of Moderately Thick (honey) Liquids or puree during this study moderately impaired oral and pharyngeal clearing was assessed with all textures which pt demonstrated difficulty clearing. He also demonstrates severely impaired oral phase of the swallow which impacts pt's ability to functionally accept PO. At this time pt demonstrates high risk for aspiration due to oral and pharyngeal phase deficits, new CVA and impaired reflexive/voltional cough. He also demonstrates high risk for malnutrition due to severity of dysphagia. See below for recommendations.    (See note for more details)     Dysphagia Treatment:  Current Diet Level:1) As PEG tube has been declined at this time recommend initiation of trial of Dysphagia I Pureed with Moderately Thick (honey) Liquids, meds crushed as able with puree, allow ice chips and popsicles in isolation (see below for strategies)  2) Due to severity of dysphagia and risk for aspiration if pt desires rehab instead of hospice would continue to recommend consideration of alternative means of nutrition/hydration in conjunction with extensive dysphagia therapy    Tolerance of Current Diet Level: Per chart and discussion with RN, pt has been receiving popsicles for comfort with oral care and suctioning following     Assessment of Texture Tolerance:  -Impressions: RN in room initially and supervising pt feeding self breakfast. RN reported pt able to self feed however, taking a long time to complete each meal. Some coughing has been noted with PO and intermittent suction has been utilized to clear oral cavity. Oral mechanism examination; lingual protrusion past the lips (improvement from yesterday), some minimal attempts to lateralize tongue (minimal improvement), decreased labial opening, decreased labial seal. Trials of puree, Moderately Thick (honey) Liquids and ice chips were provided. Pt was able to self feed during tsp with only minimal anterior bolus loss. Dysphagia continues to be characterized by decreased labial seal, severely impaired A-P propulsion (continues to attempt to tip head back and \"slurp\" the bolus), decreased bolus manipulation, oral pocketing, suspected pharyngeal pooling, decreased laryngeal elevation and suspected impaired pharyngeal clearing. Pt was noted to utilize multiple weak swallows to clear. Pharyngeal stasis vs penetration/aspiration was suspected characterized by, Intermittent throat clearing, cough, multiple swallows and slight vocal quality change. Cough appeared stronger this date. O2 saturations remained at 97%. Pt continues to try to tip head back to assist with bolus propulsion, benefited from cues to keep head in neutral position. Pt continues to demonstrate increased risk for aspiration and nutritional compromise due to severity of oropharyngeal dysphagia. Recommend continuation of current diet level with close monitoring of respiratory status, strict use of compensatory strategies, frequent oral care, mobility as able. Dysphagia Goals, addressed this date:  1. Pt will functionally tolerate trials of Moderately Thick (honey) Liquids and puree with SLP with no overt clinical s/s of aspiration/penetration (ongoing 12/14/2021)   2. Pt/family will demonstrate understanding of results Modified Barium Swallow Study, risk for aspiration, rationale for diet level and compensatory strategies (ongoing 12/14/2021)   3. Pt will utilize appropriate compensatory strategies as indicated with min with cues (head rotation to the right) (ongoing 12/14/2021)   4. Pt will demonstrate improved sensory motor function via targeted exercises and appropriate treatment modalities (ongoing 12/14/2021)   5. Determine alternative means of nutrition    Diet and Treatment Recommendations 12/14/2021:   1) Dysphagia I Pureed with Moderately Thick (honey) Liquids, meds crushed as able with puree, allow ice chips and popsicles in isolation following oral care (see below for strategies)- allow thickened soda   2) Due to severity of dysphagia and risk for aspiration if pt desires rehab instead of hospice would continue to recommend consideration of alternative means of nutrition/hydration in conjunction with extensive dysphagia therapy    3) Discussed nutritional supplement with dietician     Compensatory Strategies: Alternate solids/liquids , Check for pocketing of food L, Check for pocketing of food R, Effortful Swallows , Liquids by spoon only, Upright as possible with all PO intake , Small bites/sips , Remain upright 30-45 min , Total Feed ;Swallow 2-3 times for bite; Throat clear and re-swallow every 1-2 bites; Oral care following all intake     Speech Language Treatment:  Impressions: Pt demonstrated improved verbal initiation during session. Pt was approx 30% intelligible in connected speech. He completed confrontational naming with 100% accuracy. With mod-max cues he completed picture description (cookie picture) with 75% accuracy (1-2 words). Moderately/severely decreased articulatory ROM/precision continues to be assessed impacting overall intelligibility. He benefits from cues to overt articulate and increased his volume. Completed repetition of single words with 75% accuracy. Pt demonstrated some delayed recall of novel information, demonstrating reduced recall of dysphagia education/compensatory strategies. Speech Language STG, addressed this date:  1. Pt will improve speech intelligibility in connected speech via graded tasks to 80% (ongoing 12/14/2021)   2. Pt will improve verbal expression for functional expression via graded tasks to 80% (ongoing 12/14/2021)   3. Pt will participate in ongoing cognitive assessment with goals to be established as indicated (ongoing 12/14/2021)     Patient/Family Education:Education given to the Pt and nurse, who verbalized understanding    Assessment: Patient progressing toward goals    Plan: Continue as per plan of care    Discharge Recommendations: Pt will benefit from continued skilled Speech Therapy for Speech and Dysphagia services, prior to returning home. Treatment time  Timed Code Treatment Minutes: 10 minutes   Total Treatment time: 65 minutes     If patient discharges prior to next session this note will serve as a discharge summary.       Signature:  Ezekiel Allen, [address] [facility]  Speech Language Pathologist      -

## 2021-12-14 NOTE — PROGRESS NOTES
I placed multiple calls to [facility] via number provided in MSW progress note. No answer of facility phone 5 times between [phone]. MSW Ryan Aleman MSW notified of facility not getting pt report at this time.

## 2022-02-28 ENCOUNTER — CARE COORDINATION (OUTPATIENT)
Dept: CASE MANAGEMENT | Age: 77
End: 2022-02-28

## 2022-02-28 NOTE — CARE COORDINATION
[facility] Transitions Follow Up Call    2022    Patient: Cecy Mccracken  Patient : 1945   MRN: [id number]  Reason for Admission: CVA  Discharge Date: 21 RARS: Readmission Risk Score: 13.9 ( )     Per pt PING pt is on hospice. QI/ [facility] and pt was d/c home on           Care Transitions Subsequent and Final Call    Subsequent and Final Calls  Care Transitions Interventions  Other Interventions: Follow Up  No future appointments.     TYSHAWN Bates, RN   [address] [facility] Transition Nurse  [phone]